# Patient Record
Sex: MALE | Race: WHITE | Employment: OTHER | URBAN - METROPOLITAN AREA
[De-identification: names, ages, dates, MRNs, and addresses within clinical notes are randomized per-mention and may not be internally consistent; named-entity substitution may affect disease eponyms.]

---

## 2017-01-04 ENCOUNTER — OFFICE VISIT (OUTPATIENT)
Dept: FAMILY MEDICINE CLINIC | Facility: CLINIC | Age: 72
End: 2017-01-04

## 2017-01-04 VITALS
OXYGEN SATURATION: 98 % | HEIGHT: 75 IN | BODY MASS INDEX: 38.67 KG/M2 | DIASTOLIC BLOOD PRESSURE: 80 MMHG | TEMPERATURE: 97 F | WEIGHT: 311 LBS | RESPIRATION RATE: 18 BRPM | SYSTOLIC BLOOD PRESSURE: 130 MMHG | HEART RATE: 64 BPM

## 2017-01-04 DIAGNOSIS — E03.9 HYPOTHYROIDISM, UNSPECIFIED TYPE: Primary | ICD-10-CM

## 2017-01-04 DIAGNOSIS — E11.40 TYPE 2 DIABETES MELLITUS WITH DIABETIC NEUROPATHY, WITHOUT LONG-TERM CURRENT USE OF INSULIN (HCC): ICD-10-CM

## 2017-01-04 DIAGNOSIS — I10 BENIGN ESSENTIAL HTN: ICD-10-CM

## 2017-01-04 PROCEDURE — 99214 OFFICE O/P EST MOD 30 MIN: CPT | Performed by: FAMILY MEDICINE

## 2017-01-04 RX ORDER — IPRATROPIUM BROMIDE 21 UG/1
SPRAY, METERED NASAL
Qty: 1 BOTTLE | Refills: 2 | Status: SHIPPED | OUTPATIENT
Start: 2017-01-04 | End: 2017-05-10

## 2017-01-04 RX ORDER — LEVOTHYROXINE SODIUM 0.2 MG/1
200 TABLET ORAL
Qty: 90 TABLET | Refills: 0 | Status: SHIPPED | OUTPATIENT
Start: 2017-01-04 | End: 2017-05-08

## 2017-01-04 NOTE — PROGRESS NOTES
[facility] Family Medicine Office Note  Chief Complaint:   Patient presents with:  Medication Follow-Up: DM,HTN ,HYPERCHOL      HPI:   This is a 66year old male coming in for follow up of DM2, HTN and hypothyroidism.     1.  HTN - Patient present disease, s/ stent x3 and RCA.    • Panic disorder      sees longtime psychiatrist   • Leukocytosis 11/09/2007   • Urinary retention 11/05/2007   • Dysuria 11/5/07     w/burning   • Obese    • Elevated lipids    • Hypokalemia 9/2007   • S/P coronary artery s STENT (BMS)      OTHER      Comment AAA repair    ANGIOGRAM      THYROIDECTOMY      OTHER SURGICAL HISTORY  9/07    Comment patent right coronary artery stent    OTHER SURGICAL HISTORY      Comment rt cataract     Social History:    Smoking Status: Current tablets daily Disp: 60 tablet Rfl: 3   spironolactone 50 MG Oral Tab Take 1 tablet (50 mg total) by mouth daily. Disp: 30 tablet Rfl: 6   tamsulosin HCl 0.4 MG Oral Cap Take 0.4 mg by mouth daily.    Disp:  Rfl:    GLIPIZIDE ER 5 MG Oral Tablet 24 Hr TAKE O the extremities  MUSCULOSKELETAL:  Denies muscle, back pain, joint pain or stiffness. + edema bilaterally LE  ENDOCRINOLOGIC:  Denies reports of sweating, cold or heat intolerance. Denies polyuria or polydipsia.     EXAM:   /80 mmHg  Pulse 64  Temp(Sr Maintenance:  Diabetes Care Dilated Eye Exam due on 11/20/1945  COLONOSCOPY, 10 YEARS due on 11/20/1994  INFLUENZA VACCINE due on 09/01/2015    Patient/Caregiver Education: Patient/Caregiver Education: There are no barriers to learning.  Medical education d

## 2017-01-05 ENCOUNTER — TELEPHONE (OUTPATIENT)
Dept: FAMILY MEDICINE CLINIC | Facility: CLINIC | Age: 72
End: 2017-01-05

## 2017-01-05 ENCOUNTER — PATIENT OUTREACH (OUTPATIENT)
Dept: CASE MANAGEMENT | Age: 72
End: 2017-01-05

## 2017-01-05 DIAGNOSIS — E03.9 HYPOTHYROIDISM, UNSPECIFIED TYPE: Primary | ICD-10-CM

## 2017-01-05 NOTE — PROGRESS NOTES
1/5/2017  Called and LM on identified home phone requesting patient return my call at [phone].      1/11/2017  Called and LM on with spouse.  She states she will have patient return my call at [phone].      1/16/2017  Called and LM on identified c

## 2017-02-07 RX ORDER — GABAPENTIN 300 MG/1
300 CAPSULE ORAL 3 TIMES DAILY
Qty: 90 CAPSULE | Refills: 0 | Status: SHIPPED | OUTPATIENT
Start: 2017-02-07 | End: 2017-02-08

## 2017-02-07 NOTE — TELEPHONE ENCOUNTER
Pt needs refill of gabapentin 300 MG Oral Cap. He states [facility] has faxed to us twice. Please advise. Thank you.

## 2017-02-08 ENCOUNTER — APPOINTMENT (OUTPATIENT)
Dept: LAB | Age: 72
End: 2017-02-08
Attending: FAMILY MEDICINE
Payer: MEDICARE

## 2017-02-08 DIAGNOSIS — E03.9 HYPOTHYROIDISM, UNSPECIFIED TYPE: ICD-10-CM

## 2017-02-08 LAB
FREE T4: 1.1 NG/DL (ref 0.9–1.8)
TSI SER-ACNC: 1.12 MIU/ML (ref 0.35–5.5)

## 2017-02-08 PROCEDURE — 80048 BASIC METABOLIC PNL TOTAL CA: CPT

## 2017-02-08 PROCEDURE — 36415 COLL VENOUS BLD VENIPUNCTURE: CPT

## 2017-02-08 PROCEDURE — 84443 ASSAY THYROID STIM HORMONE: CPT

## 2017-02-08 PROCEDURE — 84439 ASSAY OF FREE THYROXINE: CPT

## 2017-02-08 RX ORDER — GABAPENTIN 300 MG/1
300 CAPSULE ORAL 3 TIMES DAILY
Qty: 270 CAPSULE | Refills: 0 | Status: SHIPPED | OUTPATIENT
Start: 2017-02-08 | End: 2017-05-08 | Stop reason: ALTCHOICE

## 2017-02-09 ENCOUNTER — TELEPHONE (OUTPATIENT)
Dept: FAMILY MEDICINE CLINIC | Facility: CLINIC | Age: 72
End: 2017-02-09

## 2017-02-09 DIAGNOSIS — E03.9 HYPOTHYROIDISM, UNSPECIFIED TYPE: Primary | ICD-10-CM

## 2017-02-16 ENCOUNTER — OFFICE VISIT (OUTPATIENT)
Dept: FAMILY MEDICINE CLINIC | Facility: CLINIC | Age: 72
End: 2017-02-16

## 2017-02-16 VITALS
HEIGHT: 75 IN | BODY MASS INDEX: 39.04 KG/M2 | SYSTOLIC BLOOD PRESSURE: 130 MMHG | TEMPERATURE: 97 F | WEIGHT: 314 LBS | RESPIRATION RATE: 18 BRPM | DIASTOLIC BLOOD PRESSURE: 80 MMHG | HEART RATE: 74 BPM

## 2017-02-16 DIAGNOSIS — K43.9 VENTRAL HERNIA WITHOUT OBSTRUCTION OR GANGRENE: Primary | ICD-10-CM

## 2017-02-16 PROCEDURE — 99213 OFFICE O/P EST LOW 20 MIN: CPT | Performed by: FAMILY MEDICINE

## 2017-02-16 NOTE — PATIENT INSTRUCTIONS
Hernia (Adult)    A hernia can happen when there is a weakness or defect in the wall of the abdomen or groin. Intestines or nearby tissues may move from their usual location and push through the weakness in the wall.  This can cause a hernia (bulge) you m · Incarcerated/Strangulated: The intestine is trapped (incarcerated). If this happens, you won’t be able to push the bulge back in. If the incarcerated hernia isn’t treated, it may become strangulated.  This means the area loses blood supply and the tissue · Nausea and vomiting  · Severe pain, redness, or tenderness in the area near the hernia  · Pain worsens quickly and doesn’t get better  · Inability to have a bowel movement or pass gas  · Fever of 100.4°F (38°C) or higher  · Trouble breathing  · Fainting · Femoral: This type is in the groin, upper thigh (where the leg bends), or labia. It is more common in women. · Ventral: This type is in the abdominal wall. · Umbilical: This type occurs around the navel (belly button).   · Incisional: This type occurs a Follow up with your healthcare provider, or as directed. If imaging tests were done, they will be reviewed a doctor. You will be told the results and any new findings that may affect your care.   When to seek medical advice  Call your healthcare provider ri

## 2017-02-16 NOTE — PROGRESS NOTES
[facility] Family Medicine Office Note  Chief Complaint:   Patient presents with:  Lump: abd lump      HPI:   This is a 66year old male coming in for bulge on lower abdomen x 2 months. Has no h/o hernia.   First noticed it 2 months ago but doesn • Enlarged thyroid    • Tracheal deviation 11/6/07     to right presumed secondary to an enlarged thyroid   • Echocardiogram abnormal 9/7/07     technically difficult study d/t anatomic reasons, mild inferior basilar hypokinesia, mild concentric left marianela Bromide 0.03 % Nasal Solution 1-2 sprays each nostril every 12 hours PRN Disp: 1 Bottle Rfl: 2   torsemide 20 MG Oral Tab Take 1 tablet (20 mg total) by mouth 2 (two) times daily.  Disp: 120 tablet Rfl: 3   METOPROLOL SUCCINATE ER 50 MG Oral Tablet 24 Hr TA Disp:  Rfl:       Ready to quit: Not Answered  Counseling given: Not Answered       REVIEW OF SYSTEMS:   ROS:  CONSTITUTIONAL:  Denies any unusual weight gain/loss, fever, chills, weakness or fatigue. SKIN:  No rash or itching.   CARDIOVASCULAR:  Denies ch Patient/Caregiver Education: There are no barriers to learning. Medical education done. Outcome: Patient verbalizes understanding. Patient is notified to call with any questions, complications, allergies, or worsening or changing symptoms.   Patient is to

## 2017-02-21 ENCOUNTER — PATIENT OUTREACH (OUTPATIENT)
Dept: CASE MANAGEMENT | Age: 72
End: 2017-02-21

## 2017-02-23 PROBLEM — K43.9 VENTRAL HERNIA WITHOUT OBSTRUCTION OR GANGRENE: Status: ACTIVE | Noted: 2017-02-23

## 2017-02-27 ENCOUNTER — TELEPHONE (OUTPATIENT)
Dept: FAMILY MEDICINE CLINIC | Facility: CLINIC | Age: 72
End: 2017-02-27

## 2017-02-27 RX ORDER — CLONAZEPAM 1 MG/1
0.5 TABLET ORAL 4 TIMES DAILY
COMMUNITY
End: 2017-04-10 | Stop reason: ALTCHOICE

## 2017-02-27 RX ORDER — FINASTERIDE 5 MG/1
5 TABLET, FILM COATED ORAL DAILY
COMMUNITY
End: 2017-03-06

## 2017-02-27 NOTE — TELEPHONE ENCOUNTER
No information received from surgeons office. Outgoing call placed to Dr. Ella Lomax for surgery scheduler(Agnes) to CB, concerning medical clearance/test/lab.

## 2017-02-27 NOTE — TELEPHONE ENCOUNTER
Patient called regarding scheduling their pre-op exam.  Advised patient to call their surgeon office and request that the information regarding their surgery (and any required testing)  be faxed to our office at [phone].  Advised patient that our offi

## 2017-02-27 NOTE — TELEPHONE ENCOUNTER
Illa Cover returned Inez's call (Inez not at desk). Jayden Lyman states pre-op testing info would come from hospital - they will determine. Jayden Lyman will call pre-admission charge nurse at [facility] (ph:  [phone]) and have her fax info to us.   This msg printe

## 2017-02-28 ENCOUNTER — TELEPHONE (OUTPATIENT)
Dept: FAMILY MEDICINE CLINIC | Facility: CLINIC | Age: 72
End: 2017-02-28

## 2017-02-28 DIAGNOSIS — Z01.818 PRE-OP TESTING: Primary | ICD-10-CM

## 2017-02-28 NOTE — TELEPHONE ENCOUNTER
Incoming fax received from [facility]. EKG/lab request([employer] panel). Patient is scheduled to have robotic assisted ventral hernia repair with mesh on 3/9/2017 with Dr. Nya Simmons.   Medical clearance is not requested, per CHERY TAYLOROE

## 2017-03-03 ENCOUNTER — APPOINTMENT (OUTPATIENT)
Dept: LAB | Facility: HOSPITAL | Age: 72
End: 2017-03-03
Payer: MEDICARE

## 2017-03-03 DIAGNOSIS — Z72.0 TOBACCO ABUSE: ICD-10-CM

## 2017-03-03 DIAGNOSIS — I87.2 VENOUS INSUFFICIENCY: ICD-10-CM

## 2017-03-03 DIAGNOSIS — L03.119 CELLULITIS OF LOWER EXTREMITY, UNSPECIFIED LATERALITY: ICD-10-CM

## 2017-03-03 DIAGNOSIS — R73.9 HYPERGLYCEMIA: ICD-10-CM

## 2017-03-03 DIAGNOSIS — Z91.81 AT RISK FOR FALLING: ICD-10-CM

## 2017-03-03 DIAGNOSIS — I10 ESSENTIAL HYPERTENSION, BENIGN: ICD-10-CM

## 2017-03-03 DIAGNOSIS — R60.9 PERIPHERAL EDEMA: ICD-10-CM

## 2017-03-03 DIAGNOSIS — G47.33 OSA ON CPAP: ICD-10-CM

## 2017-03-03 DIAGNOSIS — I50.9 ACUTE ON CHRONIC CONGESTIVE HEART FAILURE, UNSPECIFIED CONGESTIVE HEART FAILURE TYPE: ICD-10-CM

## 2017-03-03 DIAGNOSIS — F41.0 PANIC DISORDER: ICD-10-CM

## 2017-03-03 DIAGNOSIS — I21.9 MYOCARDIAL INFARCT (HCC): ICD-10-CM

## 2017-03-03 DIAGNOSIS — E66.9 DIABETES MELLITUS TYPE 2 IN OBESE (HCC): ICD-10-CM

## 2017-03-03 DIAGNOSIS — E11.69 DIABETES MELLITUS TYPE 2 IN OBESE (HCC): ICD-10-CM

## 2017-03-03 DIAGNOSIS — E78.00 HYPERCHOLESTEROLEMIA: ICD-10-CM

## 2017-03-03 DIAGNOSIS — C61 PROSTATE CANCER (HCC): ICD-10-CM

## 2017-03-03 DIAGNOSIS — E03.9 HYPOTHYROIDISM, UNSPECIFIED TYPE: ICD-10-CM

## 2017-03-03 DIAGNOSIS — C61 MALIGNANT NEOPLASM OF PROSTATE (HCC): ICD-10-CM

## 2017-03-03 DIAGNOSIS — E88.81 METABOLIC SYNDROME: ICD-10-CM

## 2017-03-03 DIAGNOSIS — I25.10 CAD IN NATIVE ARTERY: ICD-10-CM

## 2017-03-03 DIAGNOSIS — E78.5 HYPERLIPIDEMIA, UNSPECIFIED: ICD-10-CM

## 2017-03-03 DIAGNOSIS — K80.20 GALLSTONE: ICD-10-CM

## 2017-03-03 DIAGNOSIS — E11.9 TYPE 2 DIABETES MELLITUS WITHOUT COMPLICATION, UNSPECIFIED LONG TERM INSULIN USE STATUS: ICD-10-CM

## 2017-03-03 DIAGNOSIS — E66.01 MORBID OBESITY, UNSPECIFIED OBESITY TYPE (HCC): ICD-10-CM

## 2017-03-03 DIAGNOSIS — M17.11 OSTEOARTHRITIS OF RIGHT KNEE: ICD-10-CM

## 2017-03-03 DIAGNOSIS — E11.65 TYPE 2 DIABETES MELLITUS WITH HYPERGLYCEMIA, WITHOUT LONG-TERM CURRENT USE OF INSULIN (HCC): ICD-10-CM

## 2017-03-03 DIAGNOSIS — I10 ESSENTIAL HYPERTENSION: ICD-10-CM

## 2017-03-03 DIAGNOSIS — K43.9 VENTRAL HERNIA WITHOUT OBSTRUCTION OR GANGRENE: ICD-10-CM

## 2017-03-03 DIAGNOSIS — R93.1 ECHOCARDIOGRAM ABNORMAL: ICD-10-CM

## 2017-03-03 DIAGNOSIS — I25.10 CORONARY ARTERY DISEASE INVOLVING NATIVE CORONARY ARTERY OF NATIVE HEART WITHOUT ANGINA PECTORIS: ICD-10-CM

## 2017-03-03 DIAGNOSIS — I10 BENIGN ESSENTIAL HTN: ICD-10-CM

## 2017-03-03 DIAGNOSIS — N28.9 ACUTE RENAL INSUFFICIENCY: ICD-10-CM

## 2017-03-03 DIAGNOSIS — Z95.5 S/P CORONARY ARTERY STENT PLACEMENT: ICD-10-CM

## 2017-03-03 DIAGNOSIS — F32.A DEPRESSION, UNSPECIFIED DEPRESSION TYPE: ICD-10-CM

## 2017-03-03 DIAGNOSIS — J44.1 COPD EXACERBATION (HCC): ICD-10-CM

## 2017-03-03 DIAGNOSIS — J39.8 TRACHEAL DEVIATION: ICD-10-CM

## 2017-03-03 DIAGNOSIS — K64.9 HEMORRHOIDS: ICD-10-CM

## 2017-03-03 DIAGNOSIS — J42 CHRONIC BRONCHITIS, UNSPECIFIED CHRONIC BRONCHITIS TYPE (HCC): ICD-10-CM

## 2017-03-03 DIAGNOSIS — Z99.89 OSA ON CPAP: ICD-10-CM

## 2017-03-03 LAB
ATRIAL RATE: 77 BPM
CHLORIDE: 103 MMOL/L (ref 101–111)
CO2: 25 MMOL/L (ref 22–32)
P AXIS: 33 DEGREES
P-R INTERVAL: 144 MS
POTASSIUM SERPL-SCNC: 4.2 MMOL/L (ref 3.6–5.1)
Q-T INTERVAL: 382 MS
QRS DURATION: 98 MS
QTC CALCULATION (BEZET): 432 MS
R AXIS: 43 DEGREES
SODIUM SERPL-SCNC: 138 MMOL/L (ref 136–144)
T AXIS: 77 DEGREES
VENTRICULAR RATE: 77 BPM

## 2017-03-03 PROCEDURE — 93005 ELECTROCARDIOGRAM TRACING: CPT

## 2017-03-03 PROCEDURE — 80051 ELECTROLYTE PANEL: CPT

## 2017-03-03 PROCEDURE — 93010 ELECTROCARDIOGRAM REPORT: CPT | Performed by: INTERNAL MEDICINE

## 2017-03-03 PROCEDURE — 36415 COLL VENOUS BLD VENIPUNCTURE: CPT

## 2017-03-09 ENCOUNTER — ANESTHESIA EVENT (OUTPATIENT)
Dept: SURGERY | Facility: HOSPITAL | Age: 72
End: 2017-03-09

## 2017-03-09 ENCOUNTER — SURGERY (OUTPATIENT)
Age: 72
End: 2017-03-09

## 2017-03-09 ENCOUNTER — HOSPITAL ENCOUNTER (INPATIENT)
Facility: HOSPITAL | Age: 72
LOS: 3 days | Discharge: NURSING, REHAB OR LONG-TERM CARE | DRG: 353 | End: 2017-03-15
Attending: SURGERY | Admitting: SURGERY
Payer: MEDICARE

## 2017-03-09 ENCOUNTER — ANESTHESIA (OUTPATIENT)
Dept: SURGERY | Facility: HOSPITAL | Age: 72
End: 2017-03-09

## 2017-03-09 DIAGNOSIS — F41.0 PANIC DISORDER: ICD-10-CM

## 2017-03-09 DIAGNOSIS — J39.8 TRACHEAL DEVIATION: ICD-10-CM

## 2017-03-09 DIAGNOSIS — E66.01 MORBID OBESITY, UNSPECIFIED OBESITY TYPE (HCC): ICD-10-CM

## 2017-03-09 DIAGNOSIS — E11.65 TYPE 2 DIABETES MELLITUS WITH HYPERGLYCEMIA, WITHOUT LONG-TERM CURRENT USE OF INSULIN (HCC): ICD-10-CM

## 2017-03-09 DIAGNOSIS — R73.9 HYPERGLYCEMIA: ICD-10-CM

## 2017-03-09 DIAGNOSIS — I10 ESSENTIAL HYPERTENSION, BENIGN: ICD-10-CM

## 2017-03-09 DIAGNOSIS — Z99.89 OSA ON CPAP: ICD-10-CM

## 2017-03-09 DIAGNOSIS — G47.33 OSA ON CPAP: ICD-10-CM

## 2017-03-09 DIAGNOSIS — C61 MALIGNANT NEOPLASM OF PROSTATE (HCC): Primary | ICD-10-CM

## 2017-03-09 DIAGNOSIS — R93.1 ECHOCARDIOGRAM ABNORMAL: ICD-10-CM

## 2017-03-09 DIAGNOSIS — J42 CHRONIC BRONCHITIS, UNSPECIFIED CHRONIC BRONCHITIS TYPE (HCC): ICD-10-CM

## 2017-03-09 DIAGNOSIS — F32.A DEPRESSION, UNSPECIFIED DEPRESSION TYPE: ICD-10-CM

## 2017-03-09 DIAGNOSIS — L03.119 CELLULITIS OF LOWER EXTREMITY, UNSPECIFIED LATERALITY: ICD-10-CM

## 2017-03-09 DIAGNOSIS — I21.9 MYOCARDIAL INFARCT (HCC): ICD-10-CM

## 2017-03-09 DIAGNOSIS — E88.81 METABOLIC SYNDROME: ICD-10-CM

## 2017-03-09 DIAGNOSIS — I25.10 CORONARY ARTERY DISEASE INVOLVING NATIVE CORONARY ARTERY OF NATIVE HEART WITHOUT ANGINA PECTORIS: ICD-10-CM

## 2017-03-09 DIAGNOSIS — E66.9 DIABETES MELLITUS TYPE 2 IN OBESE (HCC): ICD-10-CM

## 2017-03-09 DIAGNOSIS — I25.10 CAD IN NATIVE ARTERY: ICD-10-CM

## 2017-03-09 DIAGNOSIS — Z91.81 AT RISK FOR FALLING: ICD-10-CM

## 2017-03-09 DIAGNOSIS — C61 PROSTATE CANCER (HCC): ICD-10-CM

## 2017-03-09 DIAGNOSIS — I50.9 ACUTE ON CHRONIC CONGESTIVE HEART FAILURE, UNSPECIFIED CONGESTIVE HEART FAILURE TYPE: ICD-10-CM

## 2017-03-09 DIAGNOSIS — I10 ESSENTIAL HYPERTENSION: ICD-10-CM

## 2017-03-09 DIAGNOSIS — I87.2 VENOUS INSUFFICIENCY: ICD-10-CM

## 2017-03-09 DIAGNOSIS — J44.1 COPD EXACERBATION (HCC): ICD-10-CM

## 2017-03-09 DIAGNOSIS — K64.9 HEMORRHOIDS: ICD-10-CM

## 2017-03-09 DIAGNOSIS — E03.9 HYPOTHYROIDISM, UNSPECIFIED TYPE: ICD-10-CM

## 2017-03-09 DIAGNOSIS — K43.9 VENTRAL HERNIA WITHOUT OBSTRUCTION OR GANGRENE: ICD-10-CM

## 2017-03-09 DIAGNOSIS — R60.9 PERIPHERAL EDEMA: ICD-10-CM

## 2017-03-09 DIAGNOSIS — M17.11 OSTEOARTHRITIS OF RIGHT KNEE: ICD-10-CM

## 2017-03-09 DIAGNOSIS — E78.00 HYPERCHOLESTEROLEMIA: ICD-10-CM

## 2017-03-09 DIAGNOSIS — E11.69 DIABETES MELLITUS TYPE 2 IN OBESE (HCC): ICD-10-CM

## 2017-03-09 DIAGNOSIS — I10 BENIGN ESSENTIAL HTN: ICD-10-CM

## 2017-03-09 DIAGNOSIS — E11.9 TYPE 2 DIABETES MELLITUS WITHOUT COMPLICATION, UNSPECIFIED LONG TERM INSULIN USE STATUS: ICD-10-CM

## 2017-03-09 DIAGNOSIS — N28.9 ACUTE RENAL INSUFFICIENCY: ICD-10-CM

## 2017-03-09 DIAGNOSIS — K80.20 GALLSTONE: ICD-10-CM

## 2017-03-09 DIAGNOSIS — E78.5 HYPERLIPIDEMIA, UNSPECIFIED: ICD-10-CM

## 2017-03-09 DIAGNOSIS — Z95.5 S/P CORONARY ARTERY STENT PLACEMENT: ICD-10-CM

## 2017-03-09 DIAGNOSIS — Z72.0 TOBACCO ABUSE: ICD-10-CM

## 2017-03-09 LAB
GLUCOSE BLD-MCNC: 162 MG/DL (ref 65–99)
GLUCOSE BLD-MCNC: 173 MG/DL (ref 65–99)
GLUCOSE BLD-MCNC: 181 MG/DL (ref 65–99)
GLUCOSE BLD-MCNC: 182 MG/DL (ref 65–99)

## 2017-03-09 PROCEDURE — 8E0W4CZ ROBOTIC ASSISTED PROCEDURE OF TRUNK REGION, PERCUTANEOUS ENDOSCOPIC APPROACH: ICD-10-PCS | Performed by: SURGERY

## 2017-03-09 PROCEDURE — 0WUF4JZ SUPPLEMENT ABDOMINAL WALL WITH SYNTHETIC SUBSTITUTE, PERCUTANEOUS ENDOSCOPIC APPROACH: ICD-10-PCS | Performed by: SURGERY

## 2017-03-09 PROCEDURE — 5A09357 ASSISTANCE WITH RESPIRATORY VENTILATION, LESS THAN 24 CONSECUTIVE HOURS, CONTINUOUS POSITIVE AIRWAY PRESSURE: ICD-10-PCS | Performed by: SURGERY

## 2017-03-09 PROCEDURE — 99233 SBSQ HOSP IP/OBS HIGH 50: CPT | Performed by: HOSPITALIST

## 2017-03-09 RX ORDER — ALBUTEROL SULFATE 90 UG/1
2 AEROSOL, METERED RESPIRATORY (INHALATION) EVERY 6 HOURS PRN
Status: DISCONTINUED | OUTPATIENT
Start: 2017-03-09 | End: 2017-03-15

## 2017-03-09 RX ORDER — ONDANSETRON 2 MG/ML
4 INJECTION INTRAMUSCULAR; INTRAVENOUS AS NEEDED
Status: DISCONTINUED | OUTPATIENT
Start: 2017-03-09 | End: 2017-03-09 | Stop reason: HOSPADM

## 2017-03-09 RX ORDER — POLYETHYLENE GLYCOL 3350 17 G/17G
17 POWDER, FOR SOLUTION ORAL DAILY PRN
Status: DISCONTINUED | OUTPATIENT
Start: 2017-03-09 | End: 2017-03-15

## 2017-03-09 RX ORDER — DEXTROSE MONOHYDRATE 25 G/50ML
50 INJECTION, SOLUTION INTRAVENOUS
Status: DISCONTINUED | OUTPATIENT
Start: 2017-03-09 | End: 2017-03-15

## 2017-03-09 RX ORDER — HYDROMORPHONE HYDROCHLORIDE 1 MG/ML
0.4 INJECTION, SOLUTION INTRAMUSCULAR; INTRAVENOUS; SUBCUTANEOUS EVERY 2 HOUR PRN
Status: DISCONTINUED | OUTPATIENT
Start: 2017-03-09 | End: 2017-03-15

## 2017-03-09 RX ORDER — SODIUM CHLORIDE, SODIUM LACTATE, POTASSIUM CHLORIDE, CALCIUM CHLORIDE 600; 310; 30; 20 MG/100ML; MG/100ML; MG/100ML; MG/100ML
INJECTION, SOLUTION INTRAVENOUS CONTINUOUS
Status: DISCONTINUED | OUTPATIENT
Start: 2017-03-09 | End: 2017-03-10

## 2017-03-09 RX ORDER — DEXAMETHASONE SODIUM PHOSPHATE 4 MG/ML
4 VIAL (ML) INJECTION AS NEEDED
Status: CANCELLED | OUTPATIENT
Start: 2017-03-09 | End: 2017-03-09

## 2017-03-09 RX ORDER — SODIUM CHLORIDE 9 MG/ML
INJECTION, SOLUTION INTRAVENOUS CONTINUOUS
Status: DISCONTINUED | OUTPATIENT
Start: 2017-03-09 | End: 2017-03-10

## 2017-03-09 RX ORDER — DEXTROSE MONOHYDRATE 25 G/50ML
50 INJECTION, SOLUTION INTRAVENOUS
Status: CANCELLED | OUTPATIENT
Start: 2017-03-09

## 2017-03-09 RX ORDER — METOCLOPRAMIDE HYDROCHLORIDE 5 MG/ML
10 INJECTION INTRAMUSCULAR; INTRAVENOUS EVERY 6 HOURS PRN
Status: DISCONTINUED | OUTPATIENT
Start: 2017-03-09 | End: 2017-03-13

## 2017-03-09 RX ORDER — NALOXONE HYDROCHLORIDE 0.4 MG/ML
80 INJECTION, SOLUTION INTRAMUSCULAR; INTRAVENOUS; SUBCUTANEOUS AS NEEDED
Status: CANCELLED | OUTPATIENT
Start: 2017-03-09 | End: 2017-03-09

## 2017-03-09 RX ORDER — SPIRONOLACTONE 25 MG/1
50 TABLET ORAL DAILY
Status: DISCONTINUED | OUTPATIENT
Start: 2017-03-09 | End: 2017-03-15

## 2017-03-09 RX ORDER — LOSARTAN POTASSIUM 100 MG/1
100 TABLET ORAL DAILY
Status: DISCONTINUED | OUTPATIENT
Start: 2017-03-09 | End: 2017-03-15

## 2017-03-09 RX ORDER — ATORVASTATIN CALCIUM 20 MG/1
20 TABLET, FILM COATED ORAL NIGHTLY
Status: DISCONTINUED | OUTPATIENT
Start: 2017-03-09 | End: 2017-03-15

## 2017-03-09 RX ORDER — HYDROMORPHONE HYDROCHLORIDE 1 MG/ML
0.8 INJECTION, SOLUTION INTRAMUSCULAR; INTRAVENOUS; SUBCUTANEOUS EVERY 2 HOUR PRN
Status: DISCONTINUED | OUTPATIENT
Start: 2017-03-09 | End: 2017-03-15

## 2017-03-09 RX ORDER — SODIUM PHOSPHATE, DIBASIC AND SODIUM PHOSPHATE, MONOBASIC 7; 19 G/133ML; G/133ML
1 ENEMA RECTAL ONCE AS NEEDED
Status: ACTIVE | OUTPATIENT
Start: 2017-03-09 | End: 2017-03-09

## 2017-03-09 RX ORDER — LIDOCAINE HYDROCHLORIDE 10 MG/ML
INJECTION, SOLUTION INFILTRATION; PERINEURAL AS NEEDED
Status: DISCONTINUED | OUTPATIENT
Start: 2017-03-09 | End: 2017-03-09 | Stop reason: HOSPADM

## 2017-03-09 RX ORDER — SODIUM CHLORIDE, SODIUM LACTATE, POTASSIUM CHLORIDE, CALCIUM CHLORIDE 600; 310; 30; 20 MG/100ML; MG/100ML; MG/100ML; MG/100ML
INJECTION, SOLUTION INTRAVENOUS CONTINUOUS
Status: CANCELLED | OUTPATIENT
Start: 2017-03-09

## 2017-03-09 RX ORDER — HYDROMORPHONE HYDROCHLORIDE 1 MG/ML
0.4 INJECTION, SOLUTION INTRAMUSCULAR; INTRAVENOUS; SUBCUTANEOUS EVERY 5 MIN PRN
Status: CANCELLED | OUTPATIENT
Start: 2017-03-09 | End: 2017-03-09

## 2017-03-09 RX ORDER — TORSEMIDE 20 MG/1
20 TABLET ORAL
Status: DISCONTINUED | OUTPATIENT
Start: 2017-03-09 | End: 2017-03-15

## 2017-03-09 RX ORDER — NALOXONE HYDROCHLORIDE 0.4 MG/ML
80 INJECTION, SOLUTION INTRAMUSCULAR; INTRAVENOUS; SUBCUTANEOUS AS NEEDED
Status: DISCONTINUED | OUTPATIENT
Start: 2017-03-09 | End: 2017-03-09 | Stop reason: HOSPADM

## 2017-03-09 RX ORDER — DEXTROSE MONOHYDRATE 25 G/50ML
50 INJECTION, SOLUTION INTRAVENOUS
Status: DISCONTINUED | OUTPATIENT
Start: 2017-03-09 | End: 2017-03-09 | Stop reason: HOSPADM

## 2017-03-09 RX ORDER — HYDROCODONE BITARTRATE AND ACETAMINOPHEN 5; 325 MG/1; MG/1
1 TABLET ORAL EVERY 4 HOURS PRN
Status: DISCONTINUED | OUTPATIENT
Start: 2017-03-09 | End: 2017-03-15

## 2017-03-09 RX ORDER — HYDROCODONE BITARTRATE AND ACETAMINOPHEN 5; 325 MG/1; MG/1
2 TABLET ORAL EVERY 4 HOURS PRN
Status: DISCONTINUED | OUTPATIENT
Start: 2017-03-09 | End: 2017-03-15

## 2017-03-09 RX ORDER — ALFUZOSIN HYDROCHLORIDE 10 MG/1
10 TABLET, EXTENDED RELEASE ORAL
Status: DISCONTINUED | OUTPATIENT
Start: 2017-03-10 | End: 2017-03-15

## 2017-03-09 RX ORDER — GABAPENTIN 300 MG/1
300 CAPSULE ORAL 3 TIMES DAILY
Status: DISCONTINUED | OUTPATIENT
Start: 2017-03-09 | End: 2017-03-15

## 2017-03-09 RX ORDER — CALCIUM CARBONATE 200(500)MG
1000 TABLET,CHEWABLE ORAL 3 TIMES DAILY PRN
Status: DISCONTINUED | OUTPATIENT
Start: 2017-03-09 | End: 2017-03-15

## 2017-03-09 RX ORDER — HYDROMORPHONE HYDROCHLORIDE 1 MG/ML
1.2 INJECTION, SOLUTION INTRAMUSCULAR; INTRAVENOUS; SUBCUTANEOUS EVERY 2 HOUR PRN
Status: DISCONTINUED | OUTPATIENT
Start: 2017-03-09 | End: 2017-03-15

## 2017-03-09 RX ORDER — LEVOTHYROXINE SODIUM 0.2 MG/1
200 TABLET ORAL
Status: DISCONTINUED | OUTPATIENT
Start: 2017-03-10 | End: 2017-03-15

## 2017-03-09 RX ORDER — CLONIDINE HYDROCHLORIDE 0.1 MG/1
0.1 TABLET ORAL 2 TIMES DAILY
Status: DISCONTINUED | OUTPATIENT
Start: 2017-03-09 | End: 2017-03-15

## 2017-03-09 RX ORDER — DOCUSATE SODIUM 100 MG/1
100 CAPSULE, LIQUID FILLED ORAL 2 TIMES DAILY
Status: DISCONTINUED | OUTPATIENT
Start: 2017-03-09 | End: 2017-03-15

## 2017-03-09 RX ORDER — DULOXETIN HYDROCHLORIDE 30 MG/1
60 CAPSULE, DELAYED RELEASE ORAL DAILY
Status: DISCONTINUED | OUTPATIENT
Start: 2017-03-10 | End: 2017-03-15

## 2017-03-09 RX ORDER — BUPIVACAINE HYDROCHLORIDE 5 MG/ML
INJECTION, SOLUTION EPIDURAL; INTRACAUDAL AS NEEDED
Status: DISCONTINUED | OUTPATIENT
Start: 2017-03-09 | End: 2017-03-09 | Stop reason: HOSPADM

## 2017-03-09 RX ORDER — CLONAZEPAM 1 MG/1
1 TABLET ORAL 4 TIMES DAILY
Status: DISCONTINUED | OUTPATIENT
Start: 2017-03-09 | End: 2017-03-15

## 2017-03-09 RX ORDER — FINASTERIDE 5 MG/1
5 TABLET, FILM COATED ORAL DAILY
Status: DISCONTINUED | OUTPATIENT
Start: 2017-03-10 | End: 2017-03-15

## 2017-03-09 RX ORDER — HYDROMORPHONE HYDROCHLORIDE 1 MG/ML
INJECTION, SOLUTION INTRAMUSCULAR; INTRAVENOUS; SUBCUTANEOUS
Status: COMPLETED
Start: 2017-03-09 | End: 2017-03-09

## 2017-03-09 RX ORDER — ZOLPIDEM TARTRATE 5 MG/1
5 TABLET ORAL NIGHTLY PRN
Status: DISCONTINUED | OUTPATIENT
Start: 2017-03-09 | End: 2017-03-15

## 2017-03-09 RX ORDER — BISACODYL 10 MG
10 SUPPOSITORY, RECTAL RECTAL
Status: DISCONTINUED | OUTPATIENT
Start: 2017-03-09 | End: 2017-03-15

## 2017-03-09 RX ORDER — ONDANSETRON 2 MG/ML
4 INJECTION INTRAMUSCULAR; INTRAVENOUS AS NEEDED
Status: CANCELLED | OUTPATIENT
Start: 2017-03-09 | End: 2017-03-09

## 2017-03-09 RX ORDER — HYDROMORPHONE HYDROCHLORIDE 1 MG/ML
0.4 INJECTION, SOLUTION INTRAMUSCULAR; INTRAVENOUS; SUBCUTANEOUS EVERY 5 MIN PRN
Status: DISCONTINUED | OUTPATIENT
Start: 2017-03-09 | End: 2017-03-09 | Stop reason: HOSPADM

## 2017-03-09 RX ORDER — ONDANSETRON 2 MG/ML
4 INJECTION INTRAMUSCULAR; INTRAVENOUS EVERY 6 HOURS PRN
Status: DISCONTINUED | OUTPATIENT
Start: 2017-03-09 | End: 2017-03-15

## 2017-03-09 RX ORDER — METOPROLOL SUCCINATE 50 MG/1
50 TABLET, EXTENDED RELEASE ORAL
Status: DISCONTINUED | OUTPATIENT
Start: 2017-03-10 | End: 2017-03-15

## 2017-03-09 RX ORDER — METOCLOPRAMIDE HYDROCHLORIDE 5 MG/ML
10 INJECTION INTRAMUSCULAR; INTRAVENOUS AS NEEDED
Status: CANCELLED | OUTPATIENT
Start: 2017-03-09 | End: 2017-03-09

## 2017-03-09 NOTE — INTERVAL H&P NOTE
Pre-op Diagnosis: VENTRAL HERNIA    The above referenced H&P was reviewed by Andrew Hernandez MD on 3/9/2017, the patient was examined and no significant changes have occurred in the patient's condition since the H&P was performed.   I discussed with the travon

## 2017-03-09 NOTE — H&P (VIEW-ONLY)
Vanessa Stephen is a 66year old male. Patient presents with:  Consult: ventral hernia for 20 years. ref by Dr. Magen Gillespie. Lynn Abarca    HPI:  Patient presents with a large bulge involving his abdominal wall.  This encompasses the entire area augie 11/6/07     to right presumed secondary to an enlarged thyroid   • Echocardiogram abnormal 9/7/07     technically difficult study d/t anatomic reasons, mild inferior basilar hypokinesia, mild concentric left ventricular hypertrophy, decreased left ventricu Bottle Rfl: 2   torsemide 20 MG Oral Tab Take 1 tablet (20 mg total) by mouth 2 (two) times daily.  Disp: 120 tablet Rfl: 3   METOPROLOL SUCCINATE ER 50 MG Oral Tablet 24 Hr TAKE ONE TABLET BY MOUTH ONCE DAILY Disp: 90 tablet Rfl: 0   HYDROcodone-acetaminop fever or chills  SKIN: denies any unusual skin rashes or jaundice  HEENT: denies nasal congestion, sinus pain or sore throat; hearing loss negative  RESPIRATORY: denies shortness of breath, wheezing or cough   CARDIOVASCULAR: denies chest pain or MARTINEZ; no p anxiety medicine for because of this.     Thank you very much for your kind referrals    Bennet Bamberger MD

## 2017-03-09 NOTE — ANESTHESIA POSTPROCEDURE EVALUATION
[facility] Patient Status:  Outpatient in a Bed   Age/Gender 66year old male MRN [id number]   Location [address] [facility] Attending Luca Ruelas MD   Hosp Day # 0 PCP TRISTAN POZO,        Anesthesia Post-op Note

## 2017-03-09 NOTE — PLAN OF CARE
Pt to room 305 via bed per transport. All belongings including c pap mask accompany him. Pt a/ox4. Vss. Lap sites x 3 wnl.  Will continue to monitor  At 1530, spoke with dr. Zach Acevedo. He will see the patient later this evening

## 2017-03-09 NOTE — CONSULTS
[facility]  CONSULT     Za Hernandez Patient Status:  Outpatient in a Bed    1945 MRN [id number]   [facility] 3NW-A Attending Ever Watters MD   Hosp Day # 0 PCP Alejandro Poster, DO     Reason for consult: DM  Reque 5/26/05     right   • Hypercholesterolemia    • History of noncompliance with medical treatment 9/07   • Aneurysm [facility])      large,infrarenal   • CAD (coronary artery disease)    • Metabolic syndrome    • Acute sinusitis 1/4/12   • Tobacco abuse    • Teo Onset   • Heart Attack Father    • bone cancer[other] [OTHER] Mother      Allergies: Ace Inhibitors          Coughing  Medications:    No current facility-administered medications on file prior to encounter.   Current Outpatient Prescriptions on File Prio BASE) MCG/ACT Inhalation Aero Soln Inhale 2 puffs into the lungs every 6 (six) hours as needed for Wheezing. Disp: 1 Inhaler Rfl: 3   FREESTYLE LANCETS Does not apply Misc Pt. Tests four times daily .  Dx code E11.9 Disp: 2 Box Rfl: 0   aspirin (ECOTRIN) 32 stable  10. Nicotine dependence- 1/2 PPD  1. Advised on cessation   11. Hypothyroidism  1.  Synthroid     Quality:  · DVT Prophylaxis: SCds  · CODE status: Full  Plan of care discussed with patient   Cory Delgado MD  3/9/2017

## 2017-03-09 NOTE — OR NURSING
Patient stated he fell \"this morning\" in his home before coming to the hospital. Noted skin tear to right knee and right forearm. Bacitracin ointment and bandaids applied.

## 2017-03-09 NOTE — BRIEF OP NOTE
[facility] SURGERY  Brief Op Note     Adonica Bears Location: OR   CSN [id number] MRN [id number]   Admission Date 3/9/2017 Operation Date 3/9/2017   Attending Physician Geovanna Hurtado MD Operating Physician Estevan Mortimer, MD       Pre-Operative Diag

## 2017-03-09 NOTE — ANESTHESIA PREPROCEDURE EVALUATION
PRE-OP EVALUATION    Patient Name: Navya Hyatt    Pre-op Diagnosis: VENTRAL HERNIA    Procedure(s):  ROBOTIC ASSISTED VENTRAL HERNIA REPAIR WITH MESH    Surgeon(s) and Role:     Armando Miles MD - Primary    Pre-op vitals reviewed.         Body mas mouth daily.    Disp:  Rfl:    SYMBICORT 160-4.5 MCG/ACT Inhalation Aerosol INHALE TWO PUFFS BY MOUTH TWICE DAILY (Patient taking differently: INHALE TWO PUFFS BY MOUTH TWICE DAILY PRN) Disp: 1 Inhaler Rfl: 5   Glucose Blood (FREESTYLE LITE TEST) In Vitro S iliofemoral angiogram    HEMORRHOIDECTOMY  9/6/07    CATH BARE METAL STENT (BMS)      OTHER      Comment AAA repair    ANGIOGRAM      THYROIDECTOMY      OTHER SURGICAL HISTORY  9/07    Comment patent right coronary artery stent    OTHER SURGICAL HISTORY

## 2017-03-10 LAB
ALBUMIN SERPL-MCNC: 2.9 G/DL (ref 3.5–4.8)
ALP LIVER SERPL-CCNC: 74 U/L (ref 45–117)
ALT SERPL-CCNC: 14 U/L (ref 17–63)
AST SERPL-CCNC: 13 U/L (ref 15–41)
BILIRUB SERPL-MCNC: 0.5 MG/DL (ref 0.1–2)
BUN BLD-MCNC: 35 MG/DL (ref 8–20)
CALCIUM BLD-MCNC: 8.6 MG/DL (ref 8.3–10.3)
CHLORIDE: 100 MMOL/L (ref 101–111)
CO2: 28 MMOL/L (ref 22–32)
CREAT BLD-MCNC: 2.29 MG/DL (ref 0.7–1.3)
GLUCOSE BLD-MCNC: 133 MG/DL (ref 65–99)
GLUCOSE BLD-MCNC: 149 MG/DL (ref 65–99)
GLUCOSE BLD-MCNC: 166 MG/DL (ref 65–99)
GLUCOSE BLD-MCNC: 168 MG/DL (ref 70–99)
GLUCOSE BLD-MCNC: 169 MG/DL (ref 65–99)
HAV IGM SER QL: 2.1 MG/DL (ref 1.7–3)
M PROTEIN MFR SERPL ELPH: 5.8 G/DL (ref 6.1–8.3)
POTASSIUM SERPL-SCNC: 4.4 MMOL/L (ref 3.6–5.1)
SODIUM SERPL-SCNC: 136 MMOL/L (ref 136–144)

## 2017-03-10 PROCEDURE — 99232 SBSQ HOSP IP/OBS MODERATE 35: CPT | Performed by: HOSPITALIST

## 2017-03-10 RX ORDER — SIMETHICONE 80 MG
80 TABLET,CHEWABLE ORAL 3 TIMES DAILY
Status: DISCONTINUED | OUTPATIENT
Start: 2017-03-10 | End: 2017-03-15

## 2017-03-10 RX ORDER — SIMETHICONE 80 MG
160 TABLET,CHEWABLE ORAL 3 TIMES DAILY
Status: DISCONTINUED | OUTPATIENT
Start: 2017-03-10 | End: 2017-03-15

## 2017-03-10 NOTE — PROGRESS NOTES
[facility]  Progress Note    Dukeles Mendez Patient Status:  Outpatient in a Bed    1945 MRN [id number]   [facility] 3NW-A Attending Jennifer Hernandez MD   Hosp Day # 1 PCP TRISTAN POZO,      Subjective:    Patient sittin insufficiency     Peripheral edema     Acute renal insufficiency     Ventral hernia without obstruction or gangrene        Impression:     65 y/o POD # 1 ROBOTIC ASSISTED VENTRAL HERNIA REPAIR WITH MESH  -monitor urine output/retention    Plan:    -encoura

## 2017-03-10 NOTE — PROGRESS NOTES
Pt voiding in small amounts during night. C/o increase bladder pressure. Bladder scan done with reading of >999. Straight cath done under sterile technique. Some resistance noted when inserting cath. Pt tolerated well, 900ml clear yellow urine obtained.

## 2017-03-10 NOTE — PROGRESS NOTES
Increased confusion noted after norco given. Pt getting oob by self after multiple instructions given to call for asst.  Bed alarm in place. Pt states 'i need to find my wife'. Reorientation given w/out difficulty.

## 2017-03-10 NOTE — CM/SW NOTE
Patient notified CM that he is having financial trouble. Asked CM about local resources. States he has no computer. CM provided patient with information and phone numbers for resources that he might benefit from.  CM also asked liaison from [facility] to

## 2017-03-10 NOTE — PROGRESS NOTES
asst charge rn with bedside st cath @ this time. 12 f cath used, inserted with some resistance. 450cc clear yellow urine noted upon return. Scrotal swelling noted.

## 2017-03-10 NOTE — PHYSICAL THERAPY NOTE
PHYSICAL THERAPY EVALUATION - INPATIENT     Room Number: 305/305-A  Evaluation Date: 3/10/2017  Type of Evaluation: Initial  Physician Order: PT Eval and Treat    Presenting Problem: ventral hernia repair 3/9/17  Reason for Therapy: Mobility Dysfunctio bifemoral atherosclerotic iliac disease   • Depression    • Coronary disease      s/p LAD stent   • Elevated PSA    • Hemorrhoids    • Cerumen impaction 5/26/05     right   • Hypercholesterolemia    • History of noncompliance with medical treatment 9/07 Spouse; Son     Patient Owned Equipment: None  Patient Regularly Uses: None    Prior Level of [place]: ind PTA;  Reports fall prior to admit. States used RW in the past, and decided didn't want to use anymore and now wife is using it.       SUBJECTIVE Impairment Score: 46.58%   Standardized Score (AM-PAC Scale): 43.63   CMS Modifier (G-Code): CK    FUNCTIONAL ABILITY STATUS  Gait Assessment   Gait Assistance: Maximum assistance (actual: min A)  Distance (ft): 75  Assistive Device: Rolling walker  Patter training;Strengthening;Stoop training;Stair training;Transfer training;Balance training  Rehab Potential : Good  Frequency (Obs): 5x/week  Number of Visits to Meet Established Goals: 5      CURRENT GOALS    Goal #1 Patient is able to demonstrate supine - s

## 2017-03-10 NOTE — PROGRESS NOTES
Bladder scan by this rn = 457 @ this time. Pt up to bathroom & tried to urinate but was unable. Pt reports he feels like he does have to void.   Will st cath at this time

## 2017-03-10 NOTE — PLAN OF CARE
Problem: GASTROINTESTINAL - ADULT  Goal: Maintains or returns to baseline bowel function  INTERVENTIONS:  - Assess bowel function  - Maintain adequate hydration with IV or PO as ordered and tolerated  - Evaluate effectiveness of GI medications  - Encourage falls.  - [place] fall precautions as indicated by assessment.  - Educate pt/family on patient safety including physical limitations  - Instruct pt to call for assistance with activity based on assessment  - Modify environment to reduce risk of injury  -

## 2017-03-10 NOTE — PROGRESS NOTES
[facility]  Progress Note     Wm Hardy Patient Status:  Outpatient in a Bed    1945 MRN [id number]   [facility] 3NW-A Attending Santhosh Fontaine MD   Hosp Day # 1  [facility],      Chief Complaint: s/p he • spironolactone  50 mg Oral Daily   • Fluticasone Furoate-Vilanterol  1 puff Inhalation Daily   • Alfuzosin HCl ER  10 mg Oral Daily with breakfast   • torsemide  20 mg Oral BID (Diuretic)   • insulin aspart  1-50 Units Subcutaneous TID CC and HS   • in

## 2017-03-11 LAB
AMMONIA: 11 UMOL/L (ref 11–32)
BILIRUB UR QL STRIP.AUTO: NEGATIVE
CLARITY UR REFRACT.AUTO: CLEAR
COLOR UR AUTO: YELLOW
ERYTHROCYTE [DISTWIDTH] IN BLOOD BY AUTOMATED COUNT: 13.2 % (ref 11.5–16)
FREE T4: 1.6 NG/DL (ref 0.9–1.8)
GLUCOSE BLD-MCNC: 128 MG/DL (ref 65–99)
GLUCOSE BLD-MCNC: 154 MG/DL (ref 65–99)
GLUCOSE BLD-MCNC: 160 MG/DL (ref 65–99)
GLUCOSE BLD-MCNC: 178 MG/DL (ref 65–99)
GLUCOSE UR STRIP.AUTO-MCNC: NEGATIVE MG/DL
HAV AB SERPL IA-ACNC: 407 PG/ML (ref 193–986)
HCT VFR BLD AUTO: 32.7 % (ref 37–53)
HGB BLD-MCNC: 11.1 G/DL (ref 13–17)
HSCRP: >28.5 MG/L (ref ?–3)
HYALINE CASTS #/AREA URNS AUTO: PRESENT /LPF
KETONES UR STRIP.AUTO-MCNC: NEGATIVE MG/DL
MCH RBC QN AUTO: 31.4 PG (ref 27–33.2)
MCHC RBC AUTO-ENTMCNC: 33.9 G/DL (ref 31–37)
MCV RBC AUTO: 92.4 FL (ref 80–99)
NITRITE UR QL STRIP.AUTO: NEGATIVE
PH UR STRIP.AUTO: 5 [PH] (ref 4.5–8)
PLATELET # BLD AUTO: 157 10(3)UL (ref 150–450)
PROT UR STRIP.AUTO-MCNC: 30 MG/DL
RBC # BLD AUTO: 3.54 X10(6)UL (ref 3.8–5.8)
RBC #/AREA URNS AUTO: >10 /HPF
RED CELL DISTRIBUTION WIDTH-SD: 44.7 FL (ref 35.1–46.3)
SED RATE-ML: 57 MM/HR (ref 0–12)
SP GR UR STRIP.AUTO: 1.01 (ref 1–1.03)
TSI SER-ACNC: 0.24 MIU/ML (ref 0.35–5.5)
UROBILINOGEN UR STRIP.AUTO-MCNC: <2 MG/DL
WBC # BLD AUTO: 14 X10(3) UL (ref 4–13)

## 2017-03-11 PROCEDURE — 99232 SBSQ HOSP IP/OBS MODERATE 35: CPT | Performed by: HOSPITALIST

## 2017-03-11 NOTE — PROGRESS NOTES
[facility]  Progress Note    Wm Hardy Patient Status:  Observation    1945 MRN [id number]   [facility] 3NW-A Attending Santhosh Fontaine MD   Hosp Day # 2 PCP TRISTAN POZO DO     Subjective:    Patient demanding to g exacerbation ([facility] [address]     Benign essential HTN     Diabetes mellitus type 2 in obese (HCC)     CAD in native artery     Hyperlipidemia, unspecified     At risk for falling     Congestive heart failure (CHF) (HCC)     Essential hypertension     Cellulitis of l

## 2017-03-11 NOTE — PROGRESS NOTES
Pt dropped am meds to floor. Sitting on bsc. Pt annoyed that he cannot leave today.   Dr Kelly Castellano aware of new consult

## 2017-03-11 NOTE — PROGRESS NOTES
[facility]  Progress Note     Milagro Estrella Patient Status:  Outpatient in a Bed    1945 MRN [id number]   [facility] 3NW-A Attending Maribel Purdy MD   Hosp Day # 2  [facility],      Chief Complaint: s/p he Metoprolol Succinate ER  50 mg Oral Daily Beta Blocker   • Atorvastatin Calcium  20 mg Oral Nightly   • spironolactone  50 mg Oral Daily   • Fluticasone Furoate-Vilanterol  1 puff Inhalation Daily   • Alfuzosin HCl ER  10 mg Oral Daily with breakfast   • t

## 2017-03-11 NOTE — PLAN OF CARE
Incision(s), wounds(s) or drain site(s) healing without S/S of infection Not Progressing      Minimal or absence of nausea and vomiting Progressing      Maintains or returns to baseline bowel function Progressing      Achieve highest/safest level of mobili

## 2017-03-11 NOTE — PROGRESS NOTES
Little output noted from luther. Luther irriaged a/ 30cc nss & no flow noted. Luther balloon deflated, repostioned & balloon reinserted. Luther started draining clear yellow urine.   re secured with stat lock to left leg

## 2017-03-11 NOTE — RESPIRATORY THERAPY NOTE
BR[facility]NEY - Equipment Use Daily Summary:  · Set Mode CFLEX  · Usage in hours: 2:06  · 90% Pressure (EPAP) level: 10.0  · 90% Insp Pressure (IPAP):   · AHI: 0.0  · Supplemental Oxygen:   · Comments:

## 2017-03-11 NOTE — OPERATIVE REPORT
[facility]    PATIENT'S NAME: Gaurav Enriquez   ATTENDING PHYSICIAN: Loree Cooper M.D. OPERATING PHYSICIAN: Loree Cooper M.D.    PATIENT ACCOUNT#:   [de-identified]    LOCATION:  [address] [place] [address]  MEDICAL RECORD #:   [id number]       DATE OF BIRTH:  1 in both directions before securing it. After this was accomplished, a medium size Ventralight mesh was introduced to the abdominal cavity. This was secured in position and then sewn in position circumferentially with an 0 V-Loc suture.   This provided exc

## 2017-03-11 NOTE — HOME CARE LIAISON
Received referral for [facility]. Referral sent to [facility] via [place]. [facility] has accepted pt and will provide skilled nurse and physical therapy. Unable to meet with pt/family due to pt's confusion; wife has asked me to come back later.        Thank yo

## 2017-03-11 NOTE — CM/SW NOTE
SW spoke w/pt's wife Carlene Doran regarding [facility] [employer]. PT recommending JO. Informed wife of this. Wife stated \"we can't afford it. \" Wife stating she wants home health because the pt had it in the past. Wife declined care giving resources stating they would not b

## 2017-03-11 NOTE — PROGRESS NOTES
Pt's son here. States he spoke with dr Lorena Bowling today & son would like pt to go to [facility] upon discharge.

## 2017-03-11 NOTE — PROGRESS NOTES
Assist pt to bathroom from chair.had some difficulty rising from chair to stand. Once up using walker with a fairly steady gait to bathroom. Eased pt to sit on toilet. Attempt to void with no success. Attempt to stand pt and unable to.  Each attempt pt was

## 2017-03-11 NOTE — CONSULTS
[facility]  Report of Consultation    Alize Valle Patient Status:  Observation    1945 MRN [id number]   [facility] 3NW-A Attending Mcihelle Webster MD   Hosp Day # 2  [facility],      Reason for Consultation:  Thao Jolley disease, s/ stent x3 and RCA.    • Panic disorder      sees longtime psychiatrist   • Leukocytosis 11/09/2007   • Urinary retention 11/05/2007   • Dysuria 11/5/07     w/burning   • Obese    • Elevated lipids    • Hypokalemia 9/2007   • S/P coronary artery s 09/28/2007    Comment left heart cath,liclat selective coronary angiogram,left iliofemoral angiogram    HEMORRHOIDECTOMY  9/6/07    CATH BARE METAL STENT (BMS)      OTHER      Comment AAA repair    ANGIOGRAM      THYROIDECTOMY      OTHER SURGICAL HISTORY puff, Inhalation, Q6H PRN  •  ClonazePAM (KLONOPIN) tab 1 mg, 1 mg, Oral, QID  •  CloNIDine HCl (CATAPRES) tab 0.1 mg, 0.1 mg, Oral, BID  •  DULoxetine HCl (CYMBALTA) DR particles cap 60 mg, 60 mg, Oral, Daily  •  finasteride (PROSCAR) tab 5 mg, 5 mg, Oral then closes eyes again  Son participates with our discussion  [place] catheter reviewed. Nurse had inserted it further in place and now draining better. No bleeding. Urine is pale yellow and clear.  For now with a 14fr in place, will leave this in place    [facility])     Essential hypertension     Cellulitis of lower extremity     Prostate cancer ([email or web address]     Hyperglycemia     Type 2 diabetes mellitus with hyperglycemia, without long-term current use of insulin (HCC)     Hypothyroidism     Morbid obesity ([facility]     V

## 2017-03-11 NOTE — PLAN OF CARE
Problem: GASTROINTESTINAL - ADULT  Goal: Maintains or returns to baseline bowel function  INTERVENTIONS:  - Assess bowel function  - Maintain adequate hydration with IV or PO as ordered and tolerated  - Evaluate effectiveness of GI medications  - Encourage FALL  Goal: Free from fall injury  INTERVENTIONS:  - Assess pt frequently for physical needs  - Identify cognitive and physical deficits and behaviors that affect risk of falls.   - [place] fall precautions as indicated by assessment.  - Educate pt/family

## 2017-03-12 ENCOUNTER — APPOINTMENT (OUTPATIENT)
Dept: CT IMAGING | Facility: HOSPITAL | Age: 72
DRG: 353 | End: 2017-03-12
Attending: HOSPITALIST
Payer: MEDICARE

## 2017-03-12 ENCOUNTER — APPOINTMENT (OUTPATIENT)
Dept: GENERAL RADIOLOGY | Facility: HOSPITAL | Age: 72
DRG: 353 | End: 2017-03-12
Attending: HOSPITALIST
Payer: MEDICARE

## 2017-03-12 LAB
ALBUMIN SERPL-MCNC: 2.7 G/DL (ref 3.5–4.8)
ALP LIVER SERPL-CCNC: 72 U/L (ref 45–117)
ALT SERPL-CCNC: 12 U/L (ref 17–63)
AST SERPL-CCNC: 11 U/L (ref 15–41)
BILIRUB SERPL-MCNC: 0.6 MG/DL (ref 0.1–2)
BUN BLD-MCNC: 44 MG/DL (ref 8–20)
CALCIUM BLD-MCNC: 9 MG/DL (ref 8.3–10.3)
CHLORIDE: 102 MMOL/L (ref 101–111)
CO2: 27 MMOL/L (ref 22–32)
CREAT BLD-MCNC: 2.35 MG/DL (ref 0.7–1.3)
ERYTHROCYTE [DISTWIDTH] IN BLOOD BY AUTOMATED COUNT: 13.3 % (ref 11.5–16)
GLUCOSE BLD-MCNC: 132 MG/DL (ref 70–99)
GLUCOSE BLD-MCNC: 138 MG/DL (ref 65–99)
GLUCOSE BLD-MCNC: 152 MG/DL (ref 65–99)
GLUCOSE BLD-MCNC: 163 MG/DL (ref 65–99)
GLUCOSE BLD-MCNC: 210 MG/DL (ref 65–99)
HCT VFR BLD AUTO: 31.6 % (ref 37–53)
HGB BLD-MCNC: 10.8 G/DL (ref 13–17)
M PROTEIN MFR SERPL ELPH: 6.2 G/DL (ref 6.1–8.3)
MCH RBC QN AUTO: 31.9 PG (ref 27–33.2)
MCHC RBC AUTO-ENTMCNC: 34.2 G/DL (ref 31–37)
MCV RBC AUTO: 93.2 FL (ref 80–99)
PLATELET # BLD AUTO: 158 10(3)UL (ref 150–450)
POTASSIUM SERPL-SCNC: 3.8 MMOL/L (ref 3.6–5.1)
RBC # BLD AUTO: 3.39 X10(6)UL (ref 3.8–5.8)
RED CELL DISTRIBUTION WIDTH-SD: 45.5 FL (ref 35.1–46.3)
SODIUM SERPL-SCNC: 138 MMOL/L (ref 136–144)
WBC # BLD AUTO: 12.5 X10(3) UL (ref 4–13)

## 2017-03-12 PROCEDURE — 70450 CT HEAD/BRAIN W/O DYE: CPT

## 2017-03-12 PROCEDURE — 71010 XR CHEST AP PORTABLE  (CPT=71010): CPT

## 2017-03-12 PROCEDURE — 99232 SBSQ HOSP IP/OBS MODERATE 35: CPT | Performed by: HOSPITALIST

## 2017-03-12 RX ORDER — POTASSIUM CHLORIDE 20 MEQ/1
20 TABLET, EXTENDED RELEASE ORAL ONCE
Status: COMPLETED | OUTPATIENT
Start: 2017-03-12 | End: 2017-03-12

## 2017-03-12 NOTE — PROGRESS NOTES
Neurology follow up NOTE    SUBJECTIVE:  Per nurse, he is very sleepy today. No agitation. He is sleeping with CPAP when I walk in, but easy to arouse. Denied pain and headaches.      OBJECTIVE:   /48 mmHg  Pulse 75  Temp(Src) 98.4 °F (36.9 °C) (Oral)

## 2017-03-12 NOTE — PROGRESS NOTES
[facility]  Progress Note     Iglesia Camilo Patient Status:  Outpatient in a Bed    1945 MRN [id number]   [facility] 3NW-A Attending Carmen Santos MD   Hosp Day # 3 PCP [address] [place] [address],      Chief Complaint: s/p he HCl  0.1 mg Oral BID   • DULoxetine HCl  60 mg Oral Daily   • finasteride  5 mg Oral Daily   • gabapentin  300 mg Oral TID   • Levothyroxine Sodium  200 mcg Oral Before breakfast   • losartan  100 mg Oral Daily   • Metoprolol Succinate ER  50 mg Oral Daily

## 2017-03-12 NOTE — RESPIRATORY THERAPY NOTE
BR[facility]NEY - Equipment Use Daily Summary:  · Set Mode   · Usage in hours:   · 90% Pressure (EPAP) level:   · 90% Insp Pressure (IPAP):   · AHI:   · Supplemental Oxygen:  · Comments: NO DATA

## 2017-03-12 NOTE — PROGRESS NOTES
Pt much more calm after luther draining. Son here & spoke with dr Dilip Arreola. Per dr Carmine Arreola, eeg = no seizure activity.   Dr Carmine Arreola also states she is going to order ct scan of brain for tomorrow

## 2017-03-12 NOTE — CONSULTS
[facility]    PATIENT'S NAME: Se Handler   ATTENDING PHYSICIAN: Naina Rocha M.D.   CONSULTING PHYSICIAN: Ning Flores M.D.    PATIENT ACCOUNT#:   [de-identified]    LOCATION:  [address] [place] [address]  MEDICAL RECORD #:   [id number]       DATE OF BIRTH:  10 depression, anxiety, elevated PSA, aneurysm infrarenal, sinusitis, gallstone, enlarged thyroid, prostate cancer, COPD, congestive heart failure. PAST SURGICAL HISTORY:  Cardiac catheterization, stent placement, thyroidectomy.     SOCIAL HISTORY:  He live hallucinations and agitation. Apparently, he has had surgery in the past and did not have similar issues. This could be multifactorial including medications. He did receive Norco and IV Dilaudid after the surgery.   He also has been sleep deprived the pa

## 2017-03-12 NOTE — PROCEDURES
[facility], [address] [place] [address]      PATIENT'S NAME: Janneth Iverson   ATTENDING PHYSICIAN: Avelina Henderson M.D.    PATIENT ACCOUNT #: [de-identified] LOCATION: [address] [place] [address]   MEDICAL RECORD #: [id number] DATE OF BIRTH Becki Carr M.D.  d: 03/11/2017 18:28:16  t: 03/11/2017 19:23:41  [facility] [id number]  /

## 2017-03-12 NOTE — OCCUPATIONAL THERAPY NOTE
OCCUPATIONAL THERAPY EVALUATION - INPATIENT     Room Number: 305/305-A  Evaluation Date: 3/12/2017  Type of Evaluation: Initial  Presenting Problem: Ventral Hernia, Malignant neoplasm of prostate    Physician Order: IP Consult to Occupational Therapy  Reas atherosclerotic iliac disease   • Depression    • Coronary disease      s/p LAD stent   • Elevated PSA    • Hemorrhoids    • Cerumen impaction 5/26/05     right   • Hypercholesterolemia    • History of noncompliance with medical treatment 9/07   • Aneurysm height toilet  Shower/Tub and Equipment: Walk-in shower  Other Equipment: None    Occupation/Status: retired  Hand Dominance: Right  Drives: Yes  Patient Regularly Uses: None    Prior Level of [place]: Pt reports being independent for all ADLs, IADLs washing, rinsing, drying)?: A Lot  -   Toileting, which includes using toilet, bedpan or urinal? : A Lot  -   Putting on and taking off regular upper body clothing?: A Lot  -   Taking care of personal grooming such as brushing teeth?: A Little  -   Eating Plan: Balance activities; Energy conservation/work simplification techniques;ADL training;Functional transfer training;UE strengthening/ROM; Patient/Family education;Patient/Family training;Equipment eval/education; Compensatory technique education  Rehab Pot

## 2017-03-12 NOTE — PLAN OF CARE
Will report anxiety at manageable levels Not Progressing      Confusion, delirium, dementia or psychosis is improved or at baseline Not Progressing      Achieve highest/safest level of mobility/gait Not Progressing      Incision(s), wounds(s) or drain site

## 2017-03-12 NOTE — PLAN OF CARE
Problem: GASTROINTESTINAL - ADULT  Goal: Maintains or returns to baseline bowel function  INTERVENTIONS:  - Assess bowel function  - Maintain adequate hydration with IV or PO as ordered and tolerated  - Evaluate effectiveness of GI medications  - Encourage and behaviors that affect risk of falls.   - [place] fall precautions as indicated by assessment.  - Educate pt/family on patient safety including physical limitations  - Instruct pt to call for assistance with activity based on assessment  - Modify envir

## 2017-03-12 NOTE — PROGRESS NOTES
[facility]  Progress Note    Rom Pinedo Patient Status:  Inpatient    1945 MRN [id number]   [facility] 3NW-A Attending Lynn Salas MD   Hosp Day # 3 PCP TRISTAN POZO, DO     Subjective:    Less confusion today.   Melinda Jackson bronchitis, unspecified chronic bronchitis type ([facility]     COPD exacerbation (HCC)     Benign essential HTN     Diabetes mellitus type 2 in obese ([facility]     CAD in native artery     Hyperlipidemia, unspecified     At risk for falling     Congestive heart miriam

## 2017-03-13 LAB
GLUCOSE BLD-MCNC: 127 MG/DL (ref 65–99)
GLUCOSE BLD-MCNC: 136 MG/DL (ref 65–99)
GLUCOSE BLD-MCNC: 147 MG/DL (ref 65–99)
GLUCOSE BLD-MCNC: 162 MG/DL (ref 65–99)
POTASSIUM SERPL-SCNC: 3.9 MMOL/L (ref 3.6–5.1)

## 2017-03-13 PROCEDURE — 99232 SBSQ HOSP IP/OBS MODERATE 35: CPT | Performed by: HOSPITALIST

## 2017-03-13 RX ORDER — CEPHALEXIN 500 MG/1
500 CAPSULE ORAL 3 TIMES DAILY
Status: DISCONTINUED | OUTPATIENT
Start: 2017-03-13 | End: 2017-03-15

## 2017-03-13 RX ORDER — METOCLOPRAMIDE HYDROCHLORIDE 5 MG/ML
5 INJECTION INTRAMUSCULAR; INTRAVENOUS EVERY 6 HOURS PRN
Status: DISCONTINUED | OUTPATIENT
Start: 2017-03-13 | End: 2017-03-15

## 2017-03-13 RX ORDER — HYDROCODONE BITARTRATE AND ACETAMINOPHEN 5; 325 MG/1; MG/1
1 TABLET ORAL EVERY 4 HOURS PRN
Qty: 20 TABLET | Refills: 0 | Status: SHIPPED | OUTPATIENT
Start: 2017-03-13 | End: 2017-03-22

## 2017-03-13 RX ORDER — CEPHALEXIN 500 MG/1
500 CAPSULE ORAL 3 TIMES DAILY
Qty: 30 CAPSULE | Refills: 0 | Status: SHIPPED | OUTPATIENT
Start: 2017-03-13 | End: 2017-03-23

## 2017-03-13 NOTE — PROGRESS NOTES
[facility]  Urology Progress Note    Pato Zapata Patient Status:  Inpatient    1945 MRN [id number]   [place] 3NW-A Attending Sienna Black MD   Hosp Day # 4 PCP TRISTAN POZO, DO     Subjective:  [address] [facility] still a 2 person assist.  Continue to work with PT.       [place], [place]  [facility] Urology  3/13/2017  9:13 AM

## 2017-03-13 NOTE — PLAN OF CARE
ANXIETY    • Will report anxiety at manageable levels         BEHAVIOR    • Pt/Family maintain appropriate behavior and adhere to behavioral management agreement, if implemented         CONFUSION    • Confusion, delirium, dementia or psychosis is improved

## 2017-03-13 NOTE — PLAN OF CARE
PAIN - ADULT    • Verbalizes/displays adequate comfort level or patient's stated pain goal Progressing          SAFETY ADULT - FALL    • Free from fall injury Progressing          GASTROINTESTINAL - ADULT    • Minimal or absence of nausea and vomiting Prog

## 2017-03-13 NOTE — PLAN OF CARE
ANXIETY    • Will report anxiety at manageable levels Progressing        BEHAVIOR    • Pt/Family maintain appropriate behavior and adhere to behavioral management agreement, if implemented Progressing        CONFUSION    • Confusion, delirium, dementia or monitor.

## 2017-03-13 NOTE — HOME CARE LIAISON
Received referral for [facility] on d/c for SN/PT. Met with patient who is agreeable to [facility] on d/c. Agency brochure given to patient.  Referral sent to [facility] via [facility]    Thank you for this referral,   Jose Casey

## 2017-03-13 NOTE — CM/SW NOTE
03/13/17 1100   CM/SW Referral Data   Referral Source Social Work (self-referral)   Reason for Referral Discharge planning   Informant Patient; Children   Pertinent Medical Hx   Primary Care Physician Name Love   Patient Info   Patient lives with Spouse;

## 2017-03-13 NOTE — PLAN OF CARE
The patient is in stable condition, his vital signs are stable, and he denies pain. He is tolerating his diet with no complaint of nausea and he passed flatus this evening. He sat up in the chair this evening for approximately 2 hours.

## 2017-03-13 NOTE — PHYSICAL THERAPY NOTE
PHYSICAL THERAPY TREATMENT NOTE - INPATIENT    Room Number: 004/493-J     Session: 1   Number of Visits to Meet Established Goals: 5    Presenting Problem: ventral hernia repair 3/9/17    Problem List  Active Problems:    Malignant neoplasm of prostate (H large,infrarenal   • CAD (coronary artery disease)    • Metabolic syndrome    • Acute sinusitis 1/4/12   • Tobacco abuse    • Gallstone 11/08/07     on ct small stone   • Atelectasis 11/6/07     on chest x-ray minimal right perihilar    • Enlarged thyroid techniques;Relaxation;Repositioning    BALANCE                                                                                                                     Static Sitting: Fair -  Dynamic Sitting: Fair -           Static [place] [address] sit. Pt left in chair, needs met. Patient End of Session: Up in chair;Needs met;Call light within reach;RN aware of session/findings; All patient questions and concerns addressed    ASSESSMENT   Pt seen for gait training, active flexibility and BLE

## 2017-03-13 NOTE — PROGRESS NOTES
Pharmacy Note: Renal dose adjustment for Metaclopramide (Reglan)  Nora Chowdhury has been prescribed Metoclopramide (Reglan) 10 mg every 6 hours as needed. Estimated Creatinine Clearance: 34 mL/min (based on Cr of 2.35).     His calculated creatinine c

## 2017-03-13 NOTE — PROGRESS NOTES
[facility]  Progress Note     Vince Melgar Patient Status:  Outpatient in a Bed    1945 MRN [id number]   [facility] 3NW-A Attending Radha Morales MD   Hosp Day # 4 PCP [address] [place] [address],      Chief Complaint: s/p he • CloNIDine HCl  0.1 mg Oral BID   • DULoxetine HCl  60 mg Oral Daily   • finasteride  5 mg Oral Daily   • gabapentin  300 mg Oral TID   • Levothyroxine Sodium  200 mcg Oral Before breakfast   • losartan  100 mg Oral Daily   • Metoprolol Succinate ER  50

## 2017-03-13 NOTE — CM/SW NOTE
Spoke with Dr. Yimi Shanks and Shannon Choudhury regarding d/c plan options. LMTCB for DIL Leeann: [phone]. CM awaiting return call back, updated RN.

## 2017-03-13 NOTE — PROGRESS NOTES
[facility]  Progress Note    Geo Antonio Patient Status:  Inpatient    1945 MRN [id number]   [facility] 3NW-A Attending Keiry Aquino MD   Hosp Day # 4 PCP TRISTAN POZO, DO     Subjective:    Patient sitting up in ch 2 diabetes mellitus with hyperglycemia, without long-term current use of insulin (HCC)     Hypothyroidism     Morbid obesity ([address]     Venous insufficiency     Peripheral edema     Acute renal insufficiency     Ventral hernia without obstruction or gangren

## 2017-03-13 NOTE — RESPIRATORY THERAPY NOTE
BRITNEY : EQUIPMENT USE: DAILY SUMMARY                                            SET MODE: AUTO CPAP WITH CFLEX                                           USAGE IN HOURS:  17:38

## 2017-03-14 LAB
BUN BLD-MCNC: 53 MG/DL (ref 8–20)
CALCIUM BLD-MCNC: 9.4 MG/DL (ref 8.3–10.3)
CHLORIDE: 104 MMOL/L (ref 101–111)
CO2: 29 MMOL/L (ref 22–32)
CREAT BLD-MCNC: 2.01 MG/DL (ref 0.7–1.3)
GLUCOSE BLD-MCNC: 139 MG/DL (ref 65–99)
GLUCOSE BLD-MCNC: 148 MG/DL (ref 70–99)
GLUCOSE BLD-MCNC: 170 MG/DL (ref 65–99)
GLUCOSE BLD-MCNC: 172 MG/DL (ref 65–99)
GLUCOSE BLD-MCNC: 190 MG/DL (ref 65–99)
MAGNESIUM, RBCS: 2.6 MMOL/L
POTASSIUM SERPL-SCNC: 3.7 MMOL/L (ref 3.6–5.1)
SODIUM SERPL-SCNC: 141 MMOL/L (ref 136–144)

## 2017-03-14 PROCEDURE — 99232 SBSQ HOSP IP/OBS MODERATE 35: CPT | Performed by: HOSPITALIST

## 2017-03-14 RX ORDER — POTASSIUM CHLORIDE 20 MEQ/1
20 TABLET, EXTENDED RELEASE ORAL ONCE
Status: COMPLETED | OUTPATIENT
Start: 2017-03-14 | End: 2017-03-14

## 2017-03-14 NOTE — RESPIRATORY THERAPY NOTE
BRITNEY - Equipment Use Daily Summary:  · Set Mode CFLEX  · Usage in hours: 1:54  · 90% Pressure (EPAP) level: 11.5  · 90% Insp Pressure (IPAP):   · AHI: 0.00  · Supplemental Oxygen:   · Comments:

## 2017-03-14 NOTE — PROGRESS NOTES
[facility]  Progress Note    Medhat Gaston Patient Status:  Inpatient    1945 MRN [id number]   [facility] 3NW-A Attending Cole Padron MD   Hosp Day # 5 PCP TRISTAN POZO DO     Subjective:    Patient tolerated PO die hyperglycemia, without long-term current use of insulin (HCC)     Hypothyroidism     Morbid obesity ([address]     Venous insufficiency     Peripheral edema     Acute renal insufficiency     Ventral hernia without obstruction or gangrene     Urinary retention

## 2017-03-14 NOTE — OCCUPATIONAL THERAPY NOTE
Attempted to see pt for skilled OT services this date. Pt is currently soundly sleeping with C-Pap mask applied. Will re-attempt 3/15/2017 as schedule allows.  Iva Murillo, 03/14/2017, 2:30 PM

## 2017-03-14 NOTE — PHYSICAL THERAPY NOTE
IP PT attempted x2, pt refused reporting fatigue, just returned from the [place] and did not sleep well previous night. Will re-attempt as schedule permits.

## 2017-03-14 NOTE — PLAN OF CARE
ANXIETY    • Will report anxiety at manageable levels Progressing        BEHAVIOR    • Pt/Family maintain appropriate behavior and adhere to behavioral management agreement, if implemented Progressing        CONFUSION    • Confusion, delirium, dementia or

## 2017-03-14 NOTE — PLAN OF CARE
ANXIETY    • Will report anxiety at manageable levels Progressing        BEHAVIOR    • Pt/Family maintain appropriate behavior and adhere to behavioral management agreement, if implemented Progressing        CONFUSION    • Confusion, delirium, dementia or Bed alarm. Bed in lowest position, call-light in reach, door open, falling star. \"No IV access is okay\"; spoke with Dr. Sheree French. Plan of care addressed; pt verbalizes understanding. All questions and concerns addressed. Will continue to monitor.  Plan to be

## 2017-03-14 NOTE — PROGRESS NOTES
[facility]  Progress Note     Marii Magana Patient Status:  Outpatient in a Bed    1945 MRN [id number]   [facility] 3NW-A Attending Jaziel Perez MD   Hosp Day # 5 PCP Jorge Hughes DO     Chief Complaint: s/p he CloNIDine HCl  0.1 mg Oral BID   • DULoxetine HCl  60 mg Oral Daily   • finasteride  5 mg Oral Daily   • gabapentin  300 mg Oral TID   • Levothyroxine Sodium  200 mcg Oral Before breakfast   • losartan  100 mg Oral Daily   • Metoprolol Succinate ER  50 mg

## 2017-03-14 NOTE — CM/SW NOTE
MSW spoke to son, he states he will  his Dad on Wed 3/15 at 8 am. If the appeal comes back today he wants to still wait till he allowed time is up to pick him up.  States today he has to study for a test.

## 2017-03-14 NOTE — CM/SW NOTE
MSW called by son Juanis Krueger at 4:30pm    Juanis Krueger states that he does believe his dad has shown improvement with mobility today however still thinks there are some memory/clarity issues.    Juanis Krueger states he spoke to the [facility] staff member who states that the case is sti

## 2017-03-14 NOTE — CM/SW NOTE
Spoke with DIL regarding d/c plan. Referral sent to [facility] for respite bed option. Per Chad Gatica at [facility], rate would be $200 per night. CM to meet POA (son) in room to further discuss d/c options.

## 2017-03-15 VITALS
DIASTOLIC BLOOD PRESSURE: 67 MMHG | WEIGHT: 315 LBS | HEART RATE: 69 BPM | SYSTOLIC BLOOD PRESSURE: 119 MMHG | OXYGEN SATURATION: 98 % | BODY MASS INDEX: 39.17 KG/M2 | HEIGHT: 75 IN | RESPIRATION RATE: 16 BRPM | TEMPERATURE: 98 F

## 2017-03-15 LAB
GLUCOSE BLD-MCNC: 150 MG/DL (ref 65–99)
GLUCOSE BLD-MCNC: 154 MG/DL (ref 65–99)
POTASSIUM SERPL-SCNC: 4.1 MMOL/L (ref 3.6–5.1)

## 2017-03-15 NOTE — CM/SW NOTE
MSW spoke to patient's son Nuris Sender about the O appeal. MSW explained that appeal was won, and patient has 3 inpatient midnights.  MSW explained to son that st the hospital will bill 3 midnights to Medicare, however the hospital can not guarantee future benef

## 2017-03-15 NOTE — CM/SW NOTE
MSW spoke to patient's son and patient in room. MSW reviewed PT notes with family/patient. Patient son (not Stella Echevarria) is offering to pay for skilled care at [facility]. Patient is declining this, states he wants to go home.  MSW stated she would f/u with patie

## 2017-03-15 NOTE — PROGRESS NOTES
[facility]  Progress Note    Milagro Estrella Patient Status:  Inpatient    1945 MRN [id number]   [facility] 3NW-A Attending Maribel Purdy MD   Hosp Day # 6 PCP TRISTAN POZO, DO     Subjective:    Patient worked with PT t ([facility]     Hypothyroidism     Morbid obesity ([address]     Venous insufficiency     Peripheral edema     Acute renal insufficiency     Ventral hernia without obstruction or gangrene     Urinary retention     JAMES (acute kidney injury) ([address]     CKD (chronic kidn

## 2017-03-15 NOTE — CM/SW NOTE
03/15/17 1500   Discharge disposition   Discharged to: Skilled Nurs   Name of [address]   Patient assessed for rehabilitation services?  Yes   Patient is Discharged to a [address] [place] Yes   Discharge transportatio

## 2017-03-15 NOTE — CM/SW NOTE
MSW called patient's spouse to confirm she was agreeable to her son Clay's plan for patient to go to [address] [facility]. Patient's wife is agreeable to d/c plan for [facility]. Patient was accepted to [facility].  [facility] arranged for 5:30pm    Pt to transport via

## 2017-03-15 NOTE — PLAN OF CARE
Minimal or absence of nausea and vomiting Progressing      Maintains or returns to baseline bowel function Progressing      Achieve highest/safest level of independence in self care Progressing      Achieve highest/safest level of mobility/gait Progressing

## 2017-03-15 NOTE — OCCUPATIONAL THERAPY NOTE
OCCUPATIONAL THERAPY TREATMENT NOTE - INPATIENT     Room Number: 570/032-F  Session: 1/5   Number of Visits to Meet Established Goals: 5    Presenting Problem: Ventral Hernia, Malignant neoplasm of prostate    History related to current admission:   Pt was Hemorrhoids    • Cerumen impaction 5/26/05     right   • Hypercholesterolemia    • History of noncompliance with medical treatment 9/07   • Aneurysm (HCC)      large,infrarenal   • CAD (coronary artery disease)    • Metabolic syndrome    • Acute sinusitis Restriction: None                PAIN ASSESSMENT  Ratin  Location:  (abdominal, all muscles)  Management Techniques: Activity promotion; Body mechanics;Repositioning     ACTIVITY TOLERANCE  94% on 2L, no SOB    ACTIVITIES OF DAILY LIVING ASSESSMENT  AM- supervision/assist PRN . OT Discharge Recommendations: Sub-acute rehabilitation  OT Device Recommendations: TBD    PLAN  OT Treatment Plan: Balance activities; Energy conservation/work simplification techniques;ADL training;Functional transfer training;

## 2017-03-15 NOTE — RESPIRATORY THERAPY NOTE
BRITNEY - Equipment Use Daily Summary:                  . Set Mode: AUTO CPAP WITH C-FLEX                . Usage in hours: 4:40                . 90% Pressure (EPAP) level: 10.3                . 90% Insp. Pressure (IPAP): [place] Song  AHI: 4.5

## 2017-03-15 NOTE — PHYSICAL THERAPY NOTE
PHYSICAL THERAPY TREATMENT NOTE - INPATIENT    Room Number: 639/201-E     Session: 2  Number of Visits to Meet Established Goals: 5    Presenting Problem: ventral hernia repair 3/9/17    Problem List  Active Problems:    Malignant neoplasm of prostate (HC large,infrarenal   • CAD (coronary artery disease)    • Metabolic syndrome    • Acute sinusitis 1/4/12   • Tobacco abuse    • Gallstone 11/08/07     on ct small stone   • Atelectasis 11/6/07     on chest x-ray minimal right perihilar    • Enlarged thyroid techniques;Relaxation;Repositioning    BALANCE                                                                                                                     Static Sitting: Fair -  Dynamic Sitting: Fair -           Static [place] [address] Sit>stand CGA to rw. Pt amb ~100ft c RW and CGA. No LOB noted and pt cued for safe placement of RW within [place]. Pt returned to [facility] chair c CGA, cues for hand placement and keeping RW close. Pt refusing further therex as breakfast tray arrives.  Educated pt on level: modified independent       Goal #3  Patient is able to ambulate 150 feet with assist device: least restrictive device at assistance level: supervision       Goal #4      Goal #5      Goal #6      Goal Comments: Goals established on 3/10/2017 toby

## 2017-03-15 NOTE — PROGRESS NOTES
Discharge instructions reviewed with the patient. Pt instructed diet as tolerated, activity as tolerated--no heavy lifting. Pt does not have an iv to be removed at this time. Pt going to [facility] with his CPAP tubing and mask.  Pt instructed to call and m

## 2017-03-16 ENCOUNTER — LAB REQUISITION (OUTPATIENT)
Dept: LAB | Facility: HOSPITAL | Age: 72
End: 2017-03-16
Attending: FAMILY MEDICINE
Payer: MEDICARE

## 2017-03-16 DIAGNOSIS — R69 ILLNESS: ICD-10-CM

## 2017-03-16 LAB
ALBUMIN SERPL-MCNC: 3.1 G/DL (ref 3.5–4.8)
ALP LIVER SERPL-CCNC: 106 U/L (ref 45–117)
ALT SERPL-CCNC: 107 U/L (ref 17–63)
AST SERPL-CCNC: 85 U/L (ref 15–41)
BASOPHILS # BLD AUTO: 0.1 X10(3) UL (ref 0–0.1)
BASOPHILS NFR BLD AUTO: 0.7 %
BILIRUB SERPL-MCNC: 0.4 MG/DL (ref 0.1–2)
BUN BLD-MCNC: 56 MG/DL (ref 8–20)
CALCIUM BLD-MCNC: 10.2 MG/DL (ref 8.3–10.3)
CHLORIDE: 99 MMOL/L (ref 101–111)
CO2: 27 MMOL/L (ref 22–32)
CREAT BLD-MCNC: 2.45 MG/DL (ref 0.7–1.3)
EOSINOPHIL # BLD AUTO: 0.66 X10(3) UL (ref 0–0.3)
EOSINOPHIL NFR BLD AUTO: 4.8 %
ERYTHROCYTE [DISTWIDTH] IN BLOOD BY AUTOMATED COUNT: 13.1 % (ref 11.5–16)
GLUCOSE BLD-MCNC: 170 MG/DL (ref 70–99)
HAV IGM SER QL: 2.4 MG/DL (ref 1.7–3)
HCT VFR BLD AUTO: 36.8 % (ref 37–53)
HGB BLD-MCNC: 12.5 G/DL (ref 13–17)
IMMATURE GRANULOCYTE COUNT: 0.15 X10(3) UL (ref 0–1)
IMMATURE GRANULOCYTE RATIO %: 1.1 %
LYMPHOCYTES # BLD AUTO: 1.75 X10(3) UL (ref 0.9–4)
LYMPHOCYTES NFR BLD AUTO: 12.6 %
M PROTEIN MFR SERPL ELPH: 7.6 G/DL (ref 6.1–8.3)
MCH RBC QN AUTO: 31.3 PG (ref 27–33.2)
MCHC RBC AUTO-ENTMCNC: 34 G/DL (ref 31–37)
MCV RBC AUTO: 92 FL (ref 80–99)
MONOCYTES # BLD AUTO: 1.56 X10(3) UL (ref 0.1–0.6)
MONOCYTES NFR BLD AUTO: 11.3 %
NEUTROPHIL ABS PRELIM: 9.62 X10 (3) UL (ref 1.3–6.7)
NEUTROPHILS # BLD AUTO: 9.62 X10(3) UL (ref 1.3–6.7)
NEUTROPHILS NFR BLD AUTO: 69.5 %
PLATELET # BLD AUTO: 283 10(3)UL (ref 150–450)
POTASSIUM SERPL-SCNC: 3.7 MMOL/L (ref 3.6–5.1)
RBC # BLD AUTO: 4 X10(6)UL (ref 3.8–5.8)
RED CELL DISTRIBUTION WIDTH-SD: 44 FL (ref 35.1–46.3)
SODIUM SERPL-SCNC: 137 MMOL/L (ref 136–144)
WBC # BLD AUTO: 13.8 X10(3) UL (ref 4–13)

## 2017-03-16 PROCEDURE — 80053 COMPREHEN METABOLIC PANEL: CPT | Performed by: FAMILY MEDICINE

## 2017-03-16 PROCEDURE — 85025 COMPLETE CBC W/AUTO DIFF WBC: CPT | Performed by: FAMILY MEDICINE

## 2017-03-16 PROCEDURE — 36415 COLL VENOUS BLD VENIPUNCTURE: CPT | Performed by: FAMILY MEDICINE

## 2017-03-16 PROCEDURE — 83735 ASSAY OF MAGNESIUM: CPT | Performed by: FAMILY MEDICINE

## 2017-03-17 ENCOUNTER — LAB REQUISITION (OUTPATIENT)
Dept: LAB | Facility: HOSPITAL | Age: 72
End: 2017-03-17
Attending: FAMILY MEDICINE
Payer: MEDICARE

## 2017-03-17 ENCOUNTER — TELEPHONE (OUTPATIENT)
Dept: FAMILY MEDICINE CLINIC | Facility: CLINIC | Age: 72
End: 2017-03-17

## 2017-03-17 DIAGNOSIS — R69 ILLNESS: ICD-10-CM

## 2017-03-17 LAB
BUN BLD-MCNC: 63 MG/DL (ref 8–20)
CALCIUM BLD-MCNC: 9.9 MG/DL (ref 8.3–10.3)
CHLORIDE: 100 MMOL/L (ref 101–111)
CO2: 24 MMOL/L (ref 22–32)
CREAT BLD-MCNC: 3.49 MG/DL (ref 0.7–1.3)
GLUCOSE BLD-MCNC: 160 MG/DL (ref 70–99)
POTASSIUM SERPL-SCNC: 4 MMOL/L (ref 3.6–5.1)
SODIUM SERPL-SCNC: 136 MMOL/L (ref 136–144)

## 2017-03-17 PROCEDURE — 80048 BASIC METABOLIC PNL TOTAL CA: CPT | Performed by: FAMILY MEDICINE

## 2017-03-17 PROCEDURE — 36415 COLL VENOUS BLD VENIPUNCTURE: CPT | Performed by: FAMILY MEDICINE

## 2017-03-17 NOTE — TELEPHONE ENCOUNTER
Confirmed dr adler is gone for the day. Call again to [facility] /janey-advised of earlier stated transfer to j[facility], only reaching voice mail. Asked if [facility] is still in.  Advised they will page her advising she has a call-placed me on h

## 2017-03-17 NOTE — DISCHARGE SUMMARY
[facility]  Discharge Summary    Starlette Cory Patient Status:  Inpatient    1945 MRN [id number]   [facility] 3NW-A Attending No att. providers found   Hosp Day # 6 PCP TRISTAN POZO DO     Date of Admission: 3/9/2017 without incident. Pt was admitted to floor following procedure for further evaluation and management. Once tolerating a diet and pain adequately controlled with PO rx, pt was deemed ready for discharge.               Disposition: SNF    Discharge Condition Tab  Take 1 tablet (0.1 mg total) by mouth 2 (two) times daily. , Normal, Disp-60 tablet, R-6    Potassium Chloride ER (KLOR-CON M20) 20 MEQ Oral Tab CR  Take 2 tablets daily, Normal, Disp-60 tablet, R-3    spironolactone 50 MG Oral Tab  Take 1 tablet (50 m

## 2017-03-17 NOTE — TELEPHONE ENCOUNTER
[phone] Call from dawn/spouse-listed on hipaa consent-stating pt \"had hernia repair at [facility] last wk, was fine and acting normally when he went in, surgery went fine but when he woke up he was out of his mind-not making sense, not acting like himself, [employer]

## 2017-03-18 ENCOUNTER — LAB REQUISITION (OUTPATIENT)
Dept: LAB | Facility: HOSPITAL | Age: 72
End: 2017-03-18
Attending: FAMILY MEDICINE
Payer: MEDICARE

## 2017-03-18 DIAGNOSIS — R69 ILLNESS: ICD-10-CM

## 2017-03-18 DIAGNOSIS — C61 MALIGNANT NEOPLASM OF PROSTATE (HCC): ICD-10-CM

## 2017-03-18 LAB
ALBUMIN SERPL-MCNC: 3.1 G/DL (ref 3.5–4.8)
ALP LIVER SERPL-CCNC: 99 U/L (ref 45–117)
ALT SERPL-CCNC: 93 U/L (ref 17–63)
AST SERPL-CCNC: 53 U/L (ref 15–41)
BASOPHILS # BLD AUTO: 0.06 X10(3) UL (ref 0–0.1)
BASOPHILS NFR BLD AUTO: 0.5 %
BILIRUB SERPL-MCNC: 0.5 MG/DL (ref 0.1–2)
BILIRUB UR QL STRIP.AUTO: NEGATIVE
BUN BLD-MCNC: 75 MG/DL (ref 8–20)
CALCIUM BLD-MCNC: 9.8 MG/DL (ref 8.3–10.3)
CHLORIDE: 99 MMOL/L (ref 101–111)
CO2: 28 MMOL/L (ref 22–32)
COLOR UR AUTO: YELLOW
CREAT BLD-MCNC: 3.49 MG/DL (ref 0.7–1.3)
EOSINOPHIL # BLD AUTO: 0.59 X10(3) UL (ref 0–0.3)
EOSINOPHIL NFR BLD AUTO: 4.5 %
ERYTHROCYTE [DISTWIDTH] IN BLOOD BY AUTOMATED COUNT: 13.3 % (ref 11.5–16)
GLUCOSE BLD-MCNC: 169 MG/DL (ref 70–99)
GLUCOSE UR STRIP.AUTO-MCNC: NEGATIVE MG/DL
HCT VFR BLD AUTO: 36.3 % (ref 37–53)
HGB BLD-MCNC: 11.6 G/DL (ref 13–17)
HYALINE CASTS #/AREA URNS AUTO: PRESENT /LPF
IMMATURE GRANULOCYTE COUNT: 0.27 X10(3) UL (ref 0–1)
IMMATURE GRANULOCYTE RATIO %: 2 %
KETONES UR STRIP.AUTO-MCNC: NEGATIVE MG/DL
LYMPHOCYTES # BLD AUTO: 1.64 X10(3) UL (ref 0.9–4)
LYMPHOCYTES NFR BLD AUTO: 12.4 %
M PROTEIN MFR SERPL ELPH: 7.3 G/DL (ref 6.1–8.3)
MCH RBC QN AUTO: 30.8 PG (ref 27–33.2)
MCHC RBC AUTO-ENTMCNC: 32 G/DL (ref 31–37)
MCV RBC AUTO: 96.3 FL (ref 80–99)
MONOCYTES # BLD AUTO: 1.3 X10(3) UL (ref 0.1–0.6)
MONOCYTES NFR BLD AUTO: 9.9 %
NEUTROPHIL ABS PRELIM: 9.32 X10 (3) UL (ref 1.3–6.7)
NEUTROPHILS # BLD AUTO: 9.32 X10(3) UL (ref 1.3–6.7)
NEUTROPHILS NFR BLD AUTO: 70.7 %
NITRITE UR QL STRIP.AUTO: NEGATIVE
PH UR STRIP.AUTO: 5 [PH] (ref 4.5–8)
PLATELET # BLD AUTO: 314 10(3)UL (ref 150–450)
POTASSIUM SERPL-SCNC: 3.8 MMOL/L (ref 3.6–5.1)
PROT UR STRIP.AUTO-MCNC: NEGATIVE MG/DL
RBC # BLD AUTO: 3.77 X10(6)UL (ref 3.8–5.8)
RBC #/AREA URNS AUTO: >10 /HPF
RED CELL DISTRIBUTION WIDTH-SD: 47.1 FL (ref 35.1–46.3)
SODIUM SERPL-SCNC: 136 MMOL/L (ref 136–144)
SP GR UR STRIP.AUTO: 1.01 (ref 1–1.03)
UROBILINOGEN UR STRIP.AUTO-MCNC: <2 MG/DL
WBC # BLD AUTO: 13.2 X10(3) UL (ref 4–13)

## 2017-03-18 PROCEDURE — 36415 COLL VENOUS BLD VENIPUNCTURE: CPT | Performed by: FAMILY MEDICINE

## 2017-03-18 PROCEDURE — 80053 COMPREHEN METABOLIC PANEL: CPT | Performed by: FAMILY MEDICINE

## 2017-03-18 PROCEDURE — 81001 URINALYSIS AUTO W/SCOPE: CPT | Performed by: FAMILY MEDICINE

## 2017-03-18 PROCEDURE — 87086 URINE CULTURE/COLONY COUNT: CPT | Performed by: FAMILY MEDICINE

## 2017-03-18 PROCEDURE — 85025 COMPLETE CBC W/AUTO DIFF WBC: CPT | Performed by: FAMILY MEDICINE

## 2017-03-19 ENCOUNTER — LAB REQUISITION (OUTPATIENT)
Dept: LAB | Facility: HOSPITAL | Age: 72
End: 2017-03-19
Attending: FAMILY MEDICINE
Payer: MEDICARE

## 2017-03-19 DIAGNOSIS — R19.7 DIARRHEA: ICD-10-CM

## 2017-03-19 PROCEDURE — 87493 C DIFF AMPLIFIED PROBE: CPT | Performed by: FAMILY MEDICINE

## 2017-03-20 ENCOUNTER — SNF VISIT (OUTPATIENT)
Dept: INTERNAL MEDICINE CLINIC | Age: 72
End: 2017-03-20

## 2017-03-20 ENCOUNTER — LAB REQUISITION (OUTPATIENT)
Dept: LAB | Facility: HOSPITAL | Age: 72
End: 2017-03-20
Attending: FAMILY MEDICINE
Payer: MEDICARE

## 2017-03-20 VITALS
OXYGEN SATURATION: 96 % | HEART RATE: 74 BPM | SYSTOLIC BLOOD PRESSURE: 121 MMHG | RESPIRATION RATE: 20 BRPM | TEMPERATURE: 99 F | DIASTOLIC BLOOD PRESSURE: 57 MMHG

## 2017-03-20 DIAGNOSIS — G47.33 OSA ON CPAP: ICD-10-CM

## 2017-03-20 DIAGNOSIS — R69 ILLNESS: ICD-10-CM

## 2017-03-20 DIAGNOSIS — C61 PROSTATE CANCER (HCC): ICD-10-CM

## 2017-03-20 DIAGNOSIS — Z99.89 OSA ON CPAP: ICD-10-CM

## 2017-03-20 DIAGNOSIS — N17.9 AKI (ACUTE KIDNEY INJURY) (HCC): ICD-10-CM

## 2017-03-20 DIAGNOSIS — R33.9 URINARY RETENTION: ICD-10-CM

## 2017-03-20 DIAGNOSIS — E03.9 HYPOTHYROIDISM, UNSPECIFIED TYPE: ICD-10-CM

## 2017-03-20 DIAGNOSIS — E11.69 DIABETES MELLITUS TYPE 2 IN OBESE (HCC): ICD-10-CM

## 2017-03-20 DIAGNOSIS — E11.65 TYPE 2 DIABETES MELLITUS WITH HYPERGLYCEMIA, WITHOUT LONG-TERM CURRENT USE OF INSULIN (HCC): ICD-10-CM

## 2017-03-20 DIAGNOSIS — I25.10 CORONARY ARTERY DISEASE INVOLVING NATIVE CORONARY ARTERY OF NATIVE HEART WITHOUT ANGINA PECTORIS: ICD-10-CM

## 2017-03-20 DIAGNOSIS — J42 CHRONIC BRONCHITIS, UNSPECIFIED CHRONIC BRONCHITIS TYPE (HCC): ICD-10-CM

## 2017-03-20 DIAGNOSIS — I50.9 ACUTE ON CHRONIC CONGESTIVE HEART FAILURE, UNSPECIFIED CONGESTIVE HEART FAILURE TYPE: ICD-10-CM

## 2017-03-20 DIAGNOSIS — E66.9 DIABETES MELLITUS TYPE 2 IN OBESE (HCC): ICD-10-CM
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
## 2017-03-20 DIAGNOSIS — K43.9 VENTRAL HERNIA WITHOUT OBSTRUCTION OR GANGRENE: ICD-10-CM

## 2017-03-20 DIAGNOSIS — J44.1 COPD EXACERBATION (HCC): ICD-10-CM

## 2017-03-20 DIAGNOSIS — E86.0 DEHYDRATION: ICD-10-CM

## 2017-03-20 DIAGNOSIS — N18.9 CKD (CHRONIC KIDNEY DISEASE), UNSPECIFIED STAGE: ICD-10-CM

## 2017-03-20 LAB
BUN BLD-MCNC: 75 MG/DL (ref 8–20)
CALCIUM BLD-MCNC: 9.4 MG/DL (ref 8.3–10.3)
CHLORIDE: 101 MMOL/L (ref 101–111)
CO2: 24 MMOL/L (ref 22–32)
CREAT BLD-MCNC: 2.77 MG/DL (ref 0.7–1.3)
GLUCOSE BLD-MCNC: 130 MG/DL (ref 70–99)
POTASSIUM SERPL-SCNC: 3.7 MMOL/L (ref 3.6–5.1)
SODIUM SERPL-SCNC: 135 MMOL/L (ref 136–144)

## 2017-03-20 PROCEDURE — 80048 BASIC METABOLIC PNL TOTAL CA: CPT | Performed by: FAMILY MEDICINE

## 2017-03-20 PROCEDURE — 99310 SBSQ NF CARE HIGH MDM 45: CPT | Performed by: NURSE PRACTITIONER

## 2017-03-20 NOTE — PROGRESS NOTES
Liana Gee  : 1945  Age 66year old  male patient is admitted to Facility: [address] date:  3/9/17  Discharge date to [facility]:  3/15/17  ELOS:  7-10 days   Anticipated discharge date:  3/2 Medical History   Diagnosis Date   • UTI (urinary tract infection) 3/1/05   • Essential hypertension, benign 3/26/2012   • Type II or unspecified type diabetes mellitus without mention of complication, not stated as uncontrolled 10/26/2010   • Coronary ath • Anxiety state    • Atherosclerosis of coronary artery    • Visual impairment    • Hyperlipidemia    • Essential hypertension    • Malignant neoplasm of prostate ([facility] 3/26/2012     pt does not want radiation   • Cancer [facility])      prostate   • COPD (chr (three) times daily.  Disp: 270 capsule Rfl: 0   LOSARTAN POTASSIUM 100 MG Oral Tab TAKE ONE TABLET BY MOUTH ONCE DAILY Disp: 90 tablet Rfl: 0   Levothyroxine Sodium (SYNTHROID) 200 MCG Oral Tab Take 1 tablet (200 mcg total) by mouth before breakfast. Disp: any unusual skin lesions or rashes  WOUNDS: abdominal wound ventral hernia repair    EYES:no visual complaints or deficits  HENT: denies nasal congestion, sinus pain or sore throat;  RESPIRATORY: denies shortness of breath, wheezing or cough + COPD +BRITNEY on masses; no bruits; nontender, no guarding, no rebound tenderness.   :Deferred  LYMPHATIC:no lymphedema  MUSCULOSKELETAL: no acute synovitis upper or lower extremity  EXTREMITIES/VASCULAR:no cyanosis, clubbing   1+ bilateral lower extremity edema/ erythema on symbicort, albuterol prn, and O2 as needed. Currently saturation wnl. Lung sounds care clear to ausculation. Afebrile. Hx of cad/chf/htn. Patient on torsemide, metoprolol, clonidine, potassium, spironolactone and asa daily. Daily weights.  Vitals q s 3. Psych to follow     DM2   1. Gabapentin 300 mg tid   2. Metformin 1000 mg po bid   3. Accu check ac/hs     BRITNEY on Cpap/COPD  1. Symbicort 2 puffs twice daily prn   2. Albuterol 2 puffs every 6 hrs prn   3.  O2 as needed to keep saturation above 92%

## 2017-03-21 ENCOUNTER — LAB REQUISITION (OUTPATIENT)
Dept: LAB | Facility: HOSPITAL | Age: 72
End: 2017-03-21
Attending: FAMILY MEDICINE
Payer: MEDICARE

## 2017-03-21 DIAGNOSIS — R69 ILLNESS: ICD-10-CM

## 2017-03-21 LAB
BUN BLD-MCNC: 62 MG/DL (ref 8–20)
CALCIUM BLD-MCNC: 9.4 MG/DL (ref 8.3–10.3)
CHLORIDE: 104 MMOL/L (ref 101–111)
CO2: 26 MMOL/L (ref 22–32)
CREAT BLD-MCNC: 2.54 MG/DL (ref 0.7–1.3)
GLUCOSE BLD-MCNC: 135 MG/DL (ref 70–99)
POTASSIUM SERPL-SCNC: 4.5 MMOL/L (ref 3.6–5.1)
SODIUM SERPL-SCNC: 137 MMOL/L (ref 136–144)

## 2017-03-21 PROCEDURE — 80048 BASIC METABOLIC PNL TOTAL CA: CPT | Performed by: FAMILY MEDICINE

## 2017-03-21 PROCEDURE — 36415 COLL VENOUS BLD VENIPUNCTURE: CPT | Performed by: FAMILY MEDICINE

## 2017-03-22 ENCOUNTER — SNF VISIT (OUTPATIENT)
Dept: INTERNAL MEDICINE CLINIC | Age: 72
End: 2017-03-22

## 2017-03-22 ENCOUNTER — NURSE ONLY (OUTPATIENT)
Dept: LAB | Age: 72
End: 2017-03-22
Attending: FAMILY MEDICINE
Payer: MEDICARE

## 2017-03-22 VITALS
OXYGEN SATURATION: 99 % | RESPIRATION RATE: 20 BRPM | HEART RATE: 71 BPM | SYSTOLIC BLOOD PRESSURE: 126 MMHG | DIASTOLIC BLOOD PRESSURE: 57 MMHG | BODY MASS INDEX: 38 KG/M2 | WEIGHT: 300 LBS | TEMPERATURE: 99 F

## 2017-03-22 DIAGNOSIS — N17.9 AKI (ACUTE KIDNEY INJURY) (HCC): Primary | ICD-10-CM

## 2017-03-22 DIAGNOSIS — N18.9 CKD (CHRONIC KIDNEY DISEASE), UNSPECIFIED STAGE: ICD-10-CM

## 2017-03-22 DIAGNOSIS — R69 DIAGNOSIS UNKNOWN: Primary | ICD-10-CM

## 2017-03-22 DIAGNOSIS — R41.82 ALTERED MENTAL STATUS, UNSPECIFIED ALTERED MENTAL STATUS TYPE: ICD-10-CM

## 2017-03-22 DIAGNOSIS — R33.9 URINARY RETENTION: ICD-10-CM

## 2017-03-22 DIAGNOSIS — K43.9 VENTRAL HERNIA WITHOUT OBSTRUCTION OR GANGRENE: ICD-10-CM

## 2017-03-22 DIAGNOSIS — R53.1 WEAKNESS: ICD-10-CM

## 2017-03-22 PROCEDURE — 85025 COMPLETE CBC W/AUTO DIFF WBC: CPT

## 2017-03-22 PROCEDURE — 80053 COMPREHEN METABOLIC PANEL: CPT

## 2017-03-22 PROCEDURE — 36415 COLL VENOUS BLD VENIPUNCTURE: CPT

## 2017-03-22 PROCEDURE — 99309 SBSQ NF CARE MODERATE MDM 30: CPT | Performed by: NURSE PRACTITIONER

## 2017-03-22 RX ORDER — ACETAMINOPHEN 500 MG
500 TABLET ORAL EVERY 8 HOURS PRN
COMMUNITY
End: 2017-04-28 | Stop reason: ALTCHOICE

## 2017-03-23 ENCOUNTER — APPOINTMENT (OUTPATIENT)
Dept: GENERAL RADIOLOGY | Facility: HOSPITAL | Age: 72
End: 2017-03-23
Attending: STUDENT IN AN ORGANIZED HEALTH CARE EDUCATION/TRAINING PROGRAM
Payer: MEDICARE

## 2017-03-23 ENCOUNTER — APPOINTMENT (OUTPATIENT)
Dept: CT IMAGING | Facility: HOSPITAL | Age: 72
End: 2017-03-23
Attending: STUDENT IN AN ORGANIZED HEALTH CARE EDUCATION/TRAINING PROGRAM
Payer: MEDICARE

## 2017-03-23 ENCOUNTER — HOSPITAL ENCOUNTER (EMERGENCY)
Facility: HOSPITAL | Age: 72
Discharge: HOME | End: 2017-03-23
Attending: STUDENT IN AN ORGANIZED HEALTH CARE EDUCATION/TRAINING PROGRAM
Payer: MEDICARE

## 2017-03-23 VITALS
BODY MASS INDEX: 38 KG/M2 | DIASTOLIC BLOOD PRESSURE: 64 MMHG | WEIGHT: 300 LBS | TEMPERATURE: 98 F | OXYGEN SATURATION: 96 % | HEART RATE: 79 BPM | SYSTOLIC BLOOD PRESSURE: 165 MMHG | RESPIRATION RATE: 20 BRPM

## 2017-03-23 DIAGNOSIS — S16.1XXA NECK STRAIN, INITIAL ENCOUNTER: ICD-10-CM

## 2017-03-23 DIAGNOSIS — S09.90XA HEAD INJURY, INITIAL ENCOUNTER: ICD-10-CM

## 2017-03-23 DIAGNOSIS — S39.012A BACK STRAIN, INITIAL ENCOUNTER: ICD-10-CM
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
## 2017-03-23 PROCEDURE — 71010 XR CHEST AP/PA (1 VIEW) (CPT=71010): CPT

## 2017-03-23 PROCEDURE — 72125 CT NECK SPINE W/O DYE: CPT

## 2017-03-23 PROCEDURE — 99284 EMERGENCY DEPT VISIT MOD MDM: CPT

## 2017-03-23 PROCEDURE — 70450 CT HEAD/BRAIN W/O DYE: CPT

## 2017-03-23 PROCEDURE — 72100 X-RAY EXAM L-S SPINE 2/3 VWS: CPT

## 2017-03-23 PROCEDURE — 72072 X-RAY EXAM THORAC SPINE 3VWS: CPT

## 2017-03-23 NOTE — PROGRESS NOTES
Dora Kinney, 11/20/1945, 66year old, male    Chief Complaint:  Patient presents with:   Follow - Up: s/p ventral hernia repair  Abnormal Labs  Weakness       Subjective:  PMH of HTN, T2 DM w/ PN, CAD, BRITNEY on Cpap, COPD, HTN w/ CKD, DL, CHF, venous ins masses; no bruits; nontender, no guarding, no rebound tenderness.   :Deferred  LYMPHATIC:no lymphedema  MUSCULOSKELETAL: no acute synovitis upper or lower extremity  EXTREMITIES/VASCULAR:no cyanosis, clubbing    1+ bilateral lower extremity edema/ erythem request as pt is denying pain. Add Tylenol prn. Assessment and plan:  S/P Ventral hernia repair per Dr Conchita Kimball on 3.9 / Cellulitis    1. Wound Rn to follow    2. Keflex 500 mg tid until 3.23  3. DC Norco   4.  Tylenol ES 1-2 tabs q 8 hrs prn    5. F/U [employer]

## 2017-03-23 NOTE — ED PROVIDER NOTES
Patient Seen in: [facility] Emergency Department    History   Patient presents with:  Fall (musculoskeletal, neurologic)    Stated Complaint: fall    HPI    Patient is a 19-year-old gentleman who presents emergency department after having fallen out disease)    • Metabolic syndrome    • Acute sinusitis 1/4/12   • Tobacco abuse    • Gallstone 11/08/07     on ct small stone   • Atelectasis 11/6/07     on chest x-ray minimal right perihilar    • Enlarged thyroid    • Tracheal deviation 11/6/07     to rig (300 mg total) by mouth 3 (three) times daily.    LOSARTAN POTASSIUM 100 MG Oral Tab,  TAKE ONE TABLET BY MOUTH ONCE DAILY   Levothyroxine Sodium (SYNTHROID) 200 MCG Oral Tab,  Take 1 tablet (200 mcg total) by mouth before breakfast.   Ipratropium Bromide 0 reviewed and negative except as noted above. PSFH elements reviewed from today and agreed except as otherwise stated in HPI.     Physical Exam       ED Triage Vitals   BP 03/23/17 0225 153/74 mmHg   Pulse 03/23/17 0225 85   Resp 03/23/17 0225 20   Temp 0 dislocation. Incidental findings of degenerative changes in the cervical spine were noted. X-rays of the chest as well as lumbar spine and thoracic spine were obtained. No acute fracture or dislocation was identified.     Patient discharged back to [facility]

## 2017-03-23 NOTE — ED INITIAL ASSESSMENT (HPI)
71YM c/c of fall NH reports pt fall out of his wheel chair tonight. Pt fall was witness at [facility] no LOC Pt is complaining of head,neck and back pain,.  Pt is acting within his normal mental abilities

## 2017-03-24 ENCOUNTER — SNF VISIT (OUTPATIENT)
Dept: INTERNAL MEDICINE CLINIC | Age: 72
End: 2017-03-24

## 2017-03-24 ENCOUNTER — NURSE ONLY (OUTPATIENT)
Dept: LAB | Age: 72
End: 2017-03-24
Attending: FAMILY MEDICINE
Payer: MEDICARE

## 2017-03-24 VITALS
SYSTOLIC BLOOD PRESSURE: 144 MMHG | DIASTOLIC BLOOD PRESSURE: 64 MMHG | TEMPERATURE: 97 F | RESPIRATION RATE: 18 BRPM | HEART RATE: 70 BPM

## 2017-03-24 DIAGNOSIS — N18.9 CKD (CHRONIC KIDNEY DISEASE), UNSPECIFIED STAGE: ICD-10-CM

## 2017-03-24 DIAGNOSIS — C61 MALIGNANT NEOPLASM OF PROSTATE (HCC): Primary | ICD-10-CM

## 2017-03-24 DIAGNOSIS — N17.9 AKI (ACUTE KIDNEY INJURY) (HCC): Primary | ICD-10-CM

## 2017-03-24 DIAGNOSIS — G93.0 INTRACRANIAL ARACHNOID CYST: ICD-10-CM

## 2017-03-24 DIAGNOSIS — R33.9 URINARY RETENTION: ICD-10-CM
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
## 2017-03-24 DIAGNOSIS — K43.9 VENTRAL HERNIA WITHOUT OBSTRUCTION OR GANGRENE: ICD-10-CM

## 2017-03-24 PROCEDURE — 85025 COMPLETE CBC W/AUTO DIFF WBC: CPT

## 2017-03-24 PROCEDURE — 36415 COLL VENOUS BLD VENIPUNCTURE: CPT

## 2017-03-24 PROCEDURE — 99309 SBSQ NF CARE MODERATE MDM 30: CPT | Performed by: NURSE PRACTITIONER

## 2017-03-24 PROCEDURE — 80053 COMPREHEN METABOLIC PANEL: CPT

## 2017-03-25 ENCOUNTER — SNF ADMIT/H&P (OUTPATIENT)
Dept: FAMILY MEDICINE CLINIC | Facility: CLINIC | Age: 72
End: 2017-03-25

## 2017-03-25 DIAGNOSIS — R41.82 ALTERED MENTAL STATUS, UNSPECIFIED ALTERED MENTAL STATUS TYPE: ICD-10-CM

## 2017-03-25 DIAGNOSIS — G47.33 OSA ON CPAP: ICD-10-CM

## 2017-03-25 DIAGNOSIS — E03.9 HYPOTHYROIDISM, UNSPECIFIED TYPE: ICD-10-CM

## 2017-03-25 DIAGNOSIS — F32.A DEPRESSION, UNSPECIFIED DEPRESSION TYPE: ICD-10-CM

## 2017-03-25 DIAGNOSIS — Z95.5 S/P CORONARY ARTERY STENT PLACEMENT: ICD-10-CM

## 2017-03-25 DIAGNOSIS — I87.2 VENOUS INSUFFICIENCY: ICD-10-CM

## 2017-03-25 DIAGNOSIS — R60.9 PERIPHERAL EDEMA: ICD-10-CM

## 2017-03-25 DIAGNOSIS — F41.0 PANIC DISORDER: ICD-10-CM

## 2017-03-25 DIAGNOSIS — E11.69 DIABETES MELLITUS TYPE 2 IN OBESE (HCC): ICD-10-CM

## 2017-03-25 DIAGNOSIS — I25.110 ATHEROSCLEROSIS OF CORONARY ARTERY OF NATIVE HEART WITH UNSTABLE ANGINA PECTORIS, UNSPECIFIED VESSEL OR LESION TYPE (HCC): ICD-10-CM

## 2017-03-25 DIAGNOSIS — E66.01 MORBID OBESITY, UNSPECIFIED OBESITY TYPE (HCC): ICD-10-CM

## 2017-03-25 DIAGNOSIS — I10 ESSENTIAL HYPERTENSION, BENIGN: ICD-10-CM

## 2017-03-25 DIAGNOSIS — Z98.890 S/P REPAIR OF VENTRAL HERNIA: ICD-10-CM

## 2017-03-25 DIAGNOSIS — J42 CHRONIC BRONCHITIS, UNSPECIFIED CHRONIC BRONCHITIS TYPE (HCC): ICD-10-CM

## 2017-03-25 DIAGNOSIS — Z91.81 AT RISK FOR FALLING: ICD-10-CM

## 2017-03-25 DIAGNOSIS — Z99.89 OSA ON CPAP: ICD-10-CM

## 2017-03-25 DIAGNOSIS — E78.00 HYPERCHOLESTEROLEMIA: ICD-10-CM

## 2017-03-25 DIAGNOSIS — R53.1 GENERALIZED WEAKNESS: ICD-10-CM

## 2017-03-25 DIAGNOSIS — Z87.19 S/P REPAIR OF VENTRAL HERNIA: ICD-10-CM

## 2017-03-25 DIAGNOSIS — E11.9 TYPE 2 DIABETES MELLITUS WITHOUT COMPLICATION, UNSPECIFIED LONG TERM INSULIN USE STATUS: ICD-10-CM

## 2017-03-25 DIAGNOSIS — I50.9 ACUTE ON CHRONIC CONGESTIVE HEART FAILURE, UNSPECIFIED CONGESTIVE HEART FAILURE TYPE: ICD-10-CM

## 2017-03-25 DIAGNOSIS — C61 MALIGNANT NEOPLASM OF PROSTATE (HCC): Primary | ICD-10-CM

## 2017-03-25 DIAGNOSIS — R33.9 URINARY RETENTION: ICD-10-CM

## 2017-03-25 DIAGNOSIS — E66.9 DIABETES MELLITUS TYPE 2 IN OBESE (HCC): ICD-10-CM

## 2017-03-25 PROCEDURE — 99306 1ST NF CARE HIGH MDM 50: CPT | Performed by: FAMILY MEDICINE

## 2017-03-25 NOTE — PROGRESS NOTES
Alize Valle, 11/20/1945, 66year old, male    Chief Complaint:  Patient presents with:   Follow - Up: voiding trial  Abnormal Labs       Subjective:  PMH of HTN, T2 DM w/ PN, CAD, BRITNEY on Cpap, COPD, HTN w/ CKD, DL, CHF, venous insufficiency, hx of pros tenderness.   :Deferred  LYMPHATIC:no lymphedema  MUSCULOSKELETAL: no acute synovitis upper or lower extremity  EXTREMITIES/VASCULAR:no cyanosis, clubbing    Trace bilateral lower extremity edema/ erythema/no warmth hx of pvd    NEUROLOGIC: intact; no sen XR  Findings:      Alignment is normal.      Vertebral body heights are maintained throughout the thoracic spine.      Disc spaces are maintained throughout the thoracic spine.      Marginal osteophytes are noted throughout the thoracic spine.      IMPRESS month    Confusion/Multiple falls/arachnoid cyst  1. UA=negative  2. Fall precautions  3. Clonazepam to 0.5 mg po qid  4. C diff collected diarrhea on 3.19- neg    5. Consult neurology/neurospsych    Hx of prostate ca/Urinary retention    1.  Tamsulosin hcl

## 2017-03-25 NOTE — PROGRESS NOTES
[address]: Lg Andrea MD     1945 MRN [id number]   [facility]  Admission 3/23/17      Last Hospital Discharge 3/23/17 PCP Lg Andrea [facility] Dis[facility] 3/23/17       Date of Admission: 2/ old male admitted to SNF for sub-acute rehabilitation.       Chief Complaint: Patient presents with:  Hospital F/U: [facility] H&P        HPI   65 yo male presenting to [address] s/p robotic assisted ventral hernia repair wi 0.03 % Nasal Solution 1-2 sprays each nostril every 12 hours PRN   torsemide 20 MG Oral Tab Take 1 tablet (20 mg total) by mouth 2 (two) times daily.    METOPROLOL SUCCINATE ER 50 MG Oral Tablet 24 Hr TAKE ONE TABLET BY MOUTH ONCE DAILY   simvastatin 40 MG disease; Elevated PSA; Hemorrhoids; Cerumen impaction (5/26/05); Hypercholesterolemia; History of noncompliance with medical treatment (9/07); Aneurysm ([facility] [address] CAD (coronary artery disease); Metabolic syndrome; Acute sinusitis (1/4/12);  Tobacco abuse; Zhen Mayen exudate. Eyes: Conjunctivae and EOM are normal. Right eye exhibits no discharge. Left eye exhibits no discharge. No scleral icterus. Neck: Normal range of motion. Neck supple. No JVD present. No tracheal deviation present. No thyromegaly present.    Car is unremarkable. OTHER:  None. 3/23/2017  CONCLUSION:  No change since prior exam. Mildly prominent cardiac silhouette is stable. Agree with preliminary radiology report from [address] [place] [address] radiology.      Dictated by: Benjamin Shaffer MD on 3/23/2017 at 7:3 semilunar extra-axial fluid mostly likely arachnoid cyst, 17 x 41 mm, and the right parietal region. This appears stable. Mild chronic ischemic white matter changes are seen. No skull fracture seen. No evidence for acute sinusitis, or blood in the sinuses. 3/23/2017, 3:04. INDICATIONS:  fall  TECHNIQUE:  Noncontrast CT scanning of the cervical spine is performed from the skull base through C7. Multiplanar reconstructions are generated. Dose reduction techniques were used.  Dose information is transmitted t cm radiopaque density in the right upper quadrant is noted.  IMPRESSION: No significant change since prior exam. 1.5 cm density in the right upper quadrant may correlate with gallstone visualized on prior CT exam.  Dictated by: Gabriella Fuller MD on 3/23/ 1. 30-6.70 x10(3) uL   Lymphocyte Absolute 2.17 0.90-4.00 x10(3) uL   Monocyte Absolute 0.90 (H) 0.10-0.60 x10(3) uL   Eosinophil Absolute 0.54 (H) 0.00-0.30 x10(3) uL   Basophil Absolute 0.12 (H) 0.00-0.10 x10(3) uL   Immature Granulocyte Absolute 0.23 0.0 congestive heart failure type (HCC)  -CPM, stable. 17. Hypothyroidism, unspecified type  -continue synthroid. 18. Morbid obesity, unspecified obesity type ([facility] [address]  -OP weight loss.      19. Venous insufficiency  -CPM, stable,     20. Peripheral edema

## 2017-03-27 ENCOUNTER — LAB REQUISITION (OUTPATIENT)
Dept: LAB | Facility: HOSPITAL | Age: 72
End: 2017-03-27
Attending: FAMILY MEDICINE
Payer: MEDICARE

## 2017-03-27 ENCOUNTER — SNF VISIT (OUTPATIENT)
Dept: INTERNAL MEDICINE CLINIC | Age: 72
End: 2017-03-27

## 2017-03-27 VITALS
DIASTOLIC BLOOD PRESSURE: 75 MMHG | WEIGHT: 297 LBS | SYSTOLIC BLOOD PRESSURE: 130 MMHG | RESPIRATION RATE: 18 BRPM | HEART RATE: 84 BPM | TEMPERATURE: 97 F | OXYGEN SATURATION: 97 % | BODY MASS INDEX: 37 KG/M2

## 2017-03-27 DIAGNOSIS — K43.9 VENTRAL HERNIA WITHOUT OBSTRUCTION OR GANGRENE: ICD-10-CM

## 2017-03-27 DIAGNOSIS — R41.82 ALTERED MENTAL STATUS, UNSPECIFIED ALTERED MENTAL STATUS TYPE: ICD-10-CM

## 2017-03-27 DIAGNOSIS — M54.50 ACUTE BILATERAL LOW BACK PAIN WITHOUT SCIATICA: ICD-10-CM

## 2017-03-27 DIAGNOSIS — R33.9 URINARY RETENTION: Primary | ICD-10-CM

## 2017-03-27 DIAGNOSIS — R41.82 ALTERED MENTAL STATUS: ICD-10-CM

## 2017-03-27 LAB
ALBUMIN SERPL-MCNC: 3.2 G/DL (ref 3.5–4.8)
ALP LIVER SERPL-CCNC: 107 U/L (ref 45–117)
ALT SERPL-CCNC: 34 U/L (ref 17–63)
AST SERPL-CCNC: 22 U/L (ref 15–41)
BASOPHILS # BLD AUTO: 0.07 X10(3) UL (ref 0–0.1)
BASOPHILS NFR BLD AUTO: 0.6 %
BILIRUB SERPL-MCNC: 0.4 MG/DL (ref 0.1–2)
BUN BLD-MCNC: 29 MG/DL (ref 8–20)
CALCIUM BLD-MCNC: 9.6 MG/DL (ref 8.3–10.3)
CHLORIDE: 105 MMOL/L (ref 101–111)
CO2: 23 MMOL/L (ref 22–32)
CREAT BLD-MCNC: 1.98 MG/DL (ref 0.7–1.3)
EOSINOPHIL # BLD AUTO: 0.53 X10(3) UL (ref 0–0.3)
EOSINOPHIL NFR BLD AUTO: 4.6 %
ERYTHROCYTE [DISTWIDTH] IN BLOOD BY AUTOMATED COUNT: 13.2 % (ref 11.5–16)
GLUCOSE BLD-MCNC: 147 MG/DL (ref 70–99)
HCT VFR BLD AUTO: 35.4 % (ref 37–53)
HGB BLD-MCNC: 11.9 G/DL (ref 13–17)
IMMATURE GRANULOCYTE COUNT: 0.1 X10(3) UL (ref 0–1)
IMMATURE GRANULOCYTE RATIO %: 0.9 %
LYMPHOCYTES # BLD AUTO: 2.42 X10(3) UL (ref 0.9–4)
LYMPHOCYTES NFR BLD AUTO: 20.8 %
M PROTEIN MFR SERPL ELPH: 6.9 G/DL (ref 6.1–8.3)
MCH RBC QN AUTO: 31.1 PG (ref 27–33.2)
MCHC RBC AUTO-ENTMCNC: 33.6 G/DL (ref 31–37)
MCV RBC AUTO: 92.4 FL (ref 80–99)
MONOCYTES # BLD AUTO: 0.98 X10(3) UL (ref 0.1–0.6)
MONOCYTES NFR BLD AUTO: 8.4 %
NEUTROPHIL ABS PRELIM: 7.52 X10 (3) UL (ref 1.3–6.7)
NEUTROPHILS # BLD AUTO: 7.52 X10(3) UL (ref 1.3–6.7)
NEUTROPHILS NFR BLD AUTO: 64.7 %
PLATELET # BLD AUTO: 330 10(3)UL (ref 150–450)
POTASSIUM SERPL-SCNC: 4.5 MMOL/L (ref 3.6–5.1)
RBC # BLD AUTO: 3.83 X10(6)UL (ref 3.8–5.8)
RED CELL DISTRIBUTION WIDTH-SD: 44.2 FL (ref 35.1–46.3)
SODIUM SERPL-SCNC: 137 MMOL/L (ref 136–144)
WBC # BLD AUTO: 11.6 X10(3) UL (ref 4–13)

## 2017-03-27 PROCEDURE — 99309 SBSQ NF CARE MODERATE MDM 30: CPT | Performed by: NURSE PRACTITIONER

## 2017-03-27 PROCEDURE — 80053 COMPREHEN METABOLIC PANEL: CPT | Performed by: FAMILY MEDICINE

## 2017-03-27 PROCEDURE — 85025 COMPLETE CBC W/AUTO DIFF WBC: CPT | Performed by: FAMILY MEDICINE

## 2017-03-27 PROCEDURE — 87086 URINE CULTURE/COLONY COUNT: CPT | Performed by: UROLOGY

## 2017-03-27 NOTE — PROGRESS NOTES
Wm Godoyn, 11/20/1945, 66year old, male    Chief Complaint:  Patient presents with:   Follow - Up: urinary retention  Low Back Pain       Subjective:  PMH of HTN, T2 DM w/ PN, CAD, BRITNEY on Cpap, COPD, HTN w/ CKD, DL, CHF, venous insufficiency, hx of tenderness. :Deferred.   Hutson to leg bag  LYMPHATIC:no lymphedema  MUSCULOSKELETAL: no acute synovitis upper or lower extremity  EXTREMITIES/VASCULAR:no cyanosis, clubbing    Trace bilateral lower extremity edema/ erythema/no warmth hx of pvd    NEUROLO retention    1. Tamsulosin hcl 0.4 mg, 2 tabs po daily    2. Finasteride 5 mg po daily    3. Straight cath q shift until f/u appt   4. Urology follow up ~4.10.17 for repeat voiding trial    Anxiety/depression  1. Duloxetine 60 mg po daily  2. Clonazepam 0.

## 2017-03-29 ENCOUNTER — SNF DISCHARGE (OUTPATIENT)
Dept: INTERNAL MEDICINE CLINIC | Age: 72
End: 2017-03-29

## 2017-03-29 VITALS
HEART RATE: 80 BPM | OXYGEN SATURATION: 97 % | RESPIRATION RATE: 16 BRPM | SYSTOLIC BLOOD PRESSURE: 140 MMHG | TEMPERATURE: 98 F | DIASTOLIC BLOOD PRESSURE: 80 MMHG

## 2017-03-29 DIAGNOSIS — G47.33 OSA ON CPAP: ICD-10-CM

## 2017-03-29 DIAGNOSIS — R53.1 WEAKNESS: ICD-10-CM

## 2017-03-29 DIAGNOSIS — N18.9 CKD (CHRONIC KIDNEY DISEASE), UNSPECIFIED STAGE: ICD-10-CM

## 2017-03-29 DIAGNOSIS — E03.9 HYPOTHYROIDISM, UNSPECIFIED TYPE: ICD-10-CM

## 2017-03-29 DIAGNOSIS — R33.9 URINARY RETENTION: ICD-10-CM

## 2017-03-29 DIAGNOSIS — K43.9 VENTRAL HERNIA WITHOUT OBSTRUCTION OR GANGRENE: Primary | ICD-10-CM

## 2017-03-29 DIAGNOSIS — C61 PROSTATE CANCER (HCC): ICD-10-CM

## 2017-03-29 DIAGNOSIS — I25.10 CORONARY ARTERY DISEASE INVOLVING NATIVE CORONARY ARTERY OF NATIVE HEART WITHOUT ANGINA PECTORIS: ICD-10-CM

## 2017-03-29 DIAGNOSIS — J44.1 COPD EXACERBATION (HCC): ICD-10-CM
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
## 2017-03-29 DIAGNOSIS — Z99.89 OSA ON CPAP: ICD-10-CM

## 2017-03-29 DIAGNOSIS — M54.50 ACUTE BILATERAL LOW BACK PAIN WITHOUT SCIATICA: ICD-10-CM

## 2017-03-29 DIAGNOSIS — I50.9 ACUTE ON CHRONIC CONGESTIVE HEART FAILURE, UNSPECIFIED CONGESTIVE HEART FAILURE TYPE: ICD-10-CM

## 2017-03-29 DIAGNOSIS — R41.82 ALTERED MENTAL STATUS, UNSPECIFIED ALTERED MENTAL STATUS TYPE: ICD-10-CM

## 2017-03-29 DIAGNOSIS — G93.0 INTRACRANIAL ARACHNOID CYST: ICD-10-CM

## 2017-03-29 DIAGNOSIS — E11.65 TYPE 2 DIABETES MELLITUS WITH HYPERGLYCEMIA, WITHOUT LONG-TERM CURRENT USE OF INSULIN (HCC): ICD-10-CM

## 2017-03-29 PROCEDURE — 99316 NF DSCHRG MGMT 30 MIN+: CPT | Performed by: NURSE PRACTITIONER

## 2017-03-29 NOTE — PROGRESS NOTES
Francisco Javier Dow, 11/20/1945, 66year old, male is being discharged from Facility: [address]    Date of Admission:  3.15.17    Date of Discharge:  3.29.17                                 Admitting Diagnoses:  1.  S/P ve bag  SKIN: no rashes, no suspicious lesions  + bilateral erythema/scabs to lower extremities hx of venous insufficiency    WOUND:    + Abdominal ventral hernia repair/no erythema/warmth or drainage    EYES: PERRLA, EOMI, sclera anicteric, conjunctiva angie 33* 03/27/2017   GFRNAA 94 04/15/2014   GFRAA 109 04/15/2014   CA 9.6 03/27/2017   ALKPHO 107 03/27/2017   AST 22 03/27/2017   ALT 34 03/27/2017   BILT 0.4 03/27/2017   TP 6.9 03/27/2017   ALB 3.2* 03/27/2017    03/27/2017   K 4.5 03/27/2017    daily prn    2. Albuterol 2 puffs every 6 hrs prn      CAD/CHF/HTN  1. Torsemide 20 mg twice daily  2. Metoprolol succinate 50 mg po daily    3. Clonidine 0.1 mg po bid    4. Potassium chloride 40 meq po daily    5. Spironolactone 50 mg po daily  6.  ASA 81

## 2017-04-03 ENCOUNTER — TELEPHONE (OUTPATIENT)
Dept: FAMILY MEDICINE CLINIC | Facility: CLINIC | Age: 72
End: 2017-04-03

## 2017-04-03 NOTE — TELEPHONE ENCOUNTER
He can send me a request for occupational therapy and physical therapy and I will sign off. He can also try MiraLAX along with Colace 100 mg twice daily as needed for constipation.

## 2017-04-03 NOTE — TELEPHONE ENCOUNTER
Cat from [facility] regarding the above patient. She stated that the patient was admitted from 03/09- 03-15-17. Eric is requesting a  to help him at home.  He needs help with medication, food,and his disabled son that live

## 2017-04-04 ENCOUNTER — TELEPHONE (OUTPATIENT)
Dept: FAMILY MEDICINE CLINIC | Facility: CLINIC | Age: 72
End: 2017-04-04

## 2017-04-04 ENCOUNTER — HOSPITAL ENCOUNTER (EMERGENCY)
Facility: HOSPITAL | Age: 72
Discharge: HOME | End: 2017-04-04
Attending: EMERGENCY MEDICINE
Payer: MEDICARE

## 2017-04-04 VITALS
OXYGEN SATURATION: 95 % | BODY MASS INDEX: 37.3 KG/M2 | DIASTOLIC BLOOD PRESSURE: 54 MMHG | HEIGHT: 75 IN | HEART RATE: 73 BPM | WEIGHT: 300 LBS | RESPIRATION RATE: 22 BRPM | SYSTOLIC BLOOD PRESSURE: 146 MMHG | TEMPERATURE: 99 F

## 2017-04-04 DIAGNOSIS — L03.119 CELLULITIS OF LOWER EXTREMITY, UNSPECIFIED LATERALITY: ICD-10-CM

## 2017-04-04 DIAGNOSIS — R33.9 URINARY RETENTION: Primary | ICD-10-CM

## 2017-04-04 PROCEDURE — 99284 EMERGENCY DEPT VISIT MOD MDM: CPT

## 2017-04-04 PROCEDURE — 99283 EMERGENCY DEPT VISIT LOW MDM: CPT

## 2017-04-04 RX ORDER — DOXYCYCLINE HYCLATE 100 MG/1
100 CAPSULE ORAL 2 TIMES DAILY
Qty: 20 CAPSULE | Refills: 0 | Status: SHIPPED | OUTPATIENT
Start: 2017-04-04 | End: 2017-04-10 | Stop reason: ALTCHOICE

## 2017-04-04 NOTE — ED PROVIDER NOTES
Patient Seen in: [facility] Emergency Department    History   Patient presents with:  Cath Tube Problem (gastrointestinal, urinary, integumentary)    Stated Complaint: abdominal pain/catheter pain    HPI    69-year-old male presents with several félix Coronary disease      s/p LAD stent   • Elevated PSA    • Hemorrhoids    • Cerumen impaction 5/26/05     right   • Hypercholesterolemia    • History of noncompliance with medical treatment 9/07   • Aneurysm ([address]      large,infrarenal   • CAD (coronary art Application topically 2 (two) times daily. tamsulosin HCl 0.4 MG Oral Cap,  Take 2 capsules (0.8 mg total) by mouth daily. aspirin 81 MG Oral Tab,  Take 81 mg by mouth daily.    acetaminophen 500 MG Oral Tab,  Take 500 mg by mouth every 8 (eight) hours E11.9       Family History   Problem Relation Age of Onset   • Heart Attack Father    • bone cancer[other] [OTHER] Mother          Smoking Status: Current Every Day Smoker        Packs/Day: 0.50  Years: 39        Types: Cigarettes    Smokeless Status: Cinthya Stratton distended urinary bladder. Catheter was flushed and advanced 6-8 cm by the RN and patient had return of 850 mL's of clear urine. Discussed with patient's urologist who would like to follow-up with him in clinic next week.   He requests that we inflate the

## 2017-04-04 NOTE — TELEPHONE ENCOUNTER
If I'm seeing him tomorrow, would prefer to see him first before prescribing any sleep aids. I will see him tomorrow. In the meantime, he can take melatonin OTC.

## 2017-04-04 NOTE — ED INITIAL ASSESSMENT (HPI)
Patient presents with umbilical abdominal pain since hernia repair approximately 3 weeks ago. Patient has urinary catheter in place that was scheduled to be removed. Patient saw Dr Amarilys Bentley yesterday and catheter was changed.  Patient repeatedly states \"I

## 2017-04-04 NOTE — ED NOTES
Total urine output from catheter 850ml. Hutson catheter secured using stat lock that was previously placed by Urology. Balloon is inflated.

## 2017-04-04 NOTE — ED NOTES
This RN at bedside to adjust luther catheter to obtain urine output. Tubing from luther catheter noted to be loose between urinary meatus and stat lock placed by Urology PA yesterday and dried blood on catheter tubing and patient's undergarments.  Attempted t

## 2017-04-04 NOTE — TELEPHONE ENCOUNTER
Pt is in ER with catheter difficulties. Pt scheduled HFU tomorrow but son Marcus Sheikh is asking if pt can be prescribed med to help him sleep. He is very agitated and has not slept in five days.

## 2017-04-05 ENCOUNTER — TELEPHONE (OUTPATIENT)
Dept: FAMILY MEDICINE CLINIC | Facility: CLINIC | Age: 72
End: 2017-04-05

## 2017-04-05 ENCOUNTER — HOSPITAL ENCOUNTER (EMERGENCY)
Facility: HOSPITAL | Age: 72
Discharge: HOME | End: 2017-04-05
Attending: EMERGENCY MEDICINE
Payer: MEDICARE

## 2017-04-05 VITALS
DIASTOLIC BLOOD PRESSURE: 83 MMHG | SYSTOLIC BLOOD PRESSURE: 167 MMHG | OXYGEN SATURATION: 97 % | RESPIRATION RATE: 20 BRPM | WEIGHT: 297 LBS | HEART RATE: 107 BPM | TEMPERATURE: 100 F | BODY MASS INDEX: 37 KG/M2

## 2017-04-05 DIAGNOSIS — R33.9 URINARY RETENTION: Primary | ICD-10-CM

## 2017-04-05 PROCEDURE — 51702 INSERT TEMP BLADDER CATH: CPT

## 2017-04-05 PROCEDURE — 99283 EMERGENCY DEPT VISIT LOW MDM: CPT

## 2017-04-05 RX ORDER — HYDROCODONE BITARTRATE AND ACETAMINOPHEN 5; 325 MG/1; MG/1
1 TABLET ORAL EVERY 6 HOURS PRN
Qty: 60 TABLET | Refills: 0 | Status: SHIPPED | OUTPATIENT
Start: 2017-04-05 | End: 2017-04-28 | Stop reason: ALTCHOICE

## 2017-04-05 NOTE — TELEPHONE ENCOUNTER
Pt son transferred to nurse. He is not on hippa. He is asking to speak with Dr Dorene Wilson regarding his dad.  He is asking why Dr Dorene Wilson prescribed pain med for him when 3 different dr's have told him not to take pain meds as he was in a \"medical state of dileriu

## 2017-04-05 NOTE — TELEPHONE ENCOUNTER
Pt cancelled his appt today. Would like to talk to Dr. Gabrielle Drake about his prostate. States he has had 4 or 5 operations in the last 5 days and would like to talk to Dr. Gabrielle Drake about this.

## 2017-04-05 NOTE — TELEPHONE ENCOUNTER
Call patient back this morning. He recently underwent prostate surgery and was seen in the ER last night due to urinary retention and Hutson catheter not draining properly.   He was discharged home from the ER and states that the Hutson catheter is finally d

## 2017-04-05 NOTE — ED INITIAL ASSESSMENT (HPI)
71Ym c/c of cath problem Pt state he was seen here yesterday morning for urinary retation and had a new cath placed Pt state he back tonight for the same

## 2017-04-05 NOTE — ED PROVIDER NOTES
Patient Seen in: [facility] Emergency Department    History   Patient presents with:  Cath Tube Problem (gastrointestinal, urinary, integumentary)    Stated Complaint: cath problem     HPI    79-year-old male with a history of urinary tract infectio proximal left anterior descending coronary artery, occluded OM branch of circumflex coronary artery    • Atherosclerotic heart disease 9/27/2007     bifemoral atherosclerotic iliac disease   • Depression    • Coronary disease      s/p LAD stent   • Elevate cataract       Medications :   Doxycycline Hyclate 100 MG Oral Cap,  Take 1 capsule (100 mg total) by mouth 2 (two) times daily. nystatin-triamcinolone 971964-5.0 UNIT/GM-% External Ointment,  Apply 1 Application topically 2 (two) times daily.    tamsulos 108 (90 BASE) MCG/ACT Inhalation Aero Soln,  Inhale 2 puffs into the lungs every 6 (six) hours as needed for Wheezing. FREESTYLE LANCETS Does not apply Misc,  Pt. Tests four times daily .  Dx code E11.9       Family History   Problem Relation Age of Onset no clubbing, cyanosis, edema. Neurologic exam: Cranial nerves 2-12 are intact. There is no focal motor deficit noted. Skin exam: Warm to touch. Good skin turgor. No lacerations, abrasions, lesions noted.     ED Course   Labs Reviewed - No data to displ

## 2017-04-05 NOTE — TELEPHONE ENCOUNTER
Pt aware you will speak with him later today but he will be calling Dr Shlomo Packer now with symptom update. Pt states he has difficulty standing.

## 2017-04-06 ENCOUNTER — HOSPITAL ENCOUNTER (EMERGENCY)
Facility: HOSPITAL | Age: 72
Discharge: HOME | End: 2017-04-06
Attending: EMERGENCY MEDICINE
Payer: MEDICARE

## 2017-04-06 VITALS
DIASTOLIC BLOOD PRESSURE: 60 MMHG | BODY MASS INDEX: 36.92 KG/M2 | HEART RATE: 72 BPM | TEMPERATURE: 98 F | RESPIRATION RATE: 18 BRPM | HEIGHT: 75.14 IN | OXYGEN SATURATION: 98 % | SYSTOLIC BLOOD PRESSURE: 158 MMHG | WEIGHT: 296.94 LBS

## 2017-04-06 DIAGNOSIS — R33.9 URINARY RETENTION: Primary | ICD-10-CM

## 2017-04-06 PROCEDURE — 80053 COMPREHEN METABOLIC PANEL: CPT | Performed by: EMERGENCY MEDICINE

## 2017-04-06 PROCEDURE — 87186 SC STD MICRODIL/AGAR DIL: CPT | Performed by: EMERGENCY MEDICINE

## 2017-04-06 PROCEDURE — 51702 INSERT TEMP BLADDER CATH: CPT

## 2017-04-06 PROCEDURE — 87077 CULTURE AEROBIC IDENTIFY: CPT | Performed by: EMERGENCY MEDICINE

## 2017-04-06 PROCEDURE — 96374 THER/PROPH/DIAG INJ IV PUSH: CPT

## 2017-04-06 PROCEDURE — 99284 EMERGENCY DEPT VISIT MOD MDM: CPT

## 2017-04-06 PROCEDURE — 85025 COMPLETE CBC W/AUTO DIFF WBC: CPT | Performed by: EMERGENCY MEDICINE

## 2017-04-06 PROCEDURE — 81001 URINALYSIS AUTO W/SCOPE: CPT | Performed by: EMERGENCY MEDICINE

## 2017-04-06 PROCEDURE — 99285 EMERGENCY DEPT VISIT HI MDM: CPT

## 2017-04-06 PROCEDURE — 87086 URINE CULTURE/COLONY COUNT: CPT | Performed by: EMERGENCY MEDICINE

## 2017-04-06 RX ORDER — HYDROMORPHONE HYDROCHLORIDE 1 MG/ML
1 INJECTION, SOLUTION INTRAMUSCULAR; INTRAVENOUS; SUBCUTANEOUS ONCE
Status: COMPLETED | OUTPATIENT
Start: 2017-04-06 | End: 2017-04-06

## 2017-04-06 RX ORDER — CEPHALEXIN 500 MG/1
500 CAPSULE ORAL 4 TIMES DAILY
Qty: 28 CAPSULE | Refills: 0 | Status: SHIPPED | OUTPATIENT
Start: 2017-04-06 | End: 2017-04-10

## 2017-04-06 NOTE — ED INITIAL ASSESSMENT (HPI)
Hutson catheter coming loose for the past week. Was in ER last night. Catheter was cleaned out and re inserted.

## 2017-04-06 NOTE — ED PROVIDER NOTES
Patient Seen in: [facility] Emergency Department    History   Patient presents with:  Cath Tube Problem (gastrointestinal, urinary, integumentary)    Stated Complaint: luther catheter not draining    HPI    The patient is a 44-year-old male with mult 9/07   • Aneurysm (HCC)      large,infrarenal   • CAD (coronary artery disease)    • Metabolic syndrome    • Acute sinusitis 1/4/12   • Tobacco abuse    • Gallstone 11/08/07     on ct small stone   • Atelectasis 11/6/07     on chest x-ray minimal right per Ointment,  Apply 1 Application topically 2 (two) times daily. tamsulosin HCl 0.4 MG Oral Cap,  Take 2 capsules (0.8 mg total) by mouth daily. aspirin 81 MG Oral Tab,  Take 81 mg by mouth daily.    acetaminophen 500 MG Oral Tab,  Take 500 mg by mouth nehemiah daily .  Dx code E11.9       Family History   Problem Relation Age of Onset   • Heart Attack Father    • bone cancer[other] [OTHER] Mother          Smoking Status: Current Every Day Smoker        Packs/Day: 0.50  Years: 39        Types: Cigarettes    Smokel range of motion of all 4 extremities. Distal pulses normal and symmetric  Skin: No masses or nodules or abnormalities.   Psych: Normal interaction, cooperative with exam      ED Course     Labs Reviewed   COMP METABOLIC PANEL (14) - Abnormal; Notable for t removed and he immediately felt great relief of his symptoms. MDM     Urinalysis does show findings consistent with UTI. Culture is pending.   I did discuss the patient with Dr. Nayeli Stone who is on-call for the patient's urologist, Dr. Oliva Alvarenga, and he recomm

## 2017-04-07 ENCOUNTER — PATIENT OUTREACH (OUTPATIENT)
Dept: CASE MANAGEMENT | Age: 72
End: 2017-04-07

## 2017-04-07 NOTE — PROGRESS NOTES
4/7/2017  Called to f/up with pt secondary to medical review where pt has had 3 ED visits starting 04/04/, 04/05, & 04/06. Pt reports \"I'm hanging in there but I have had 6 operations in 5 days and my body is exhausted\".  Pt seen in ED multiple times for

## 2017-04-08 RX ORDER — SULFAMETHOXAZOLE AND TRIMETHOPRIM 800; 160 MG/1; MG/1
1 TABLET ORAL 2 TIMES DAILY
Qty: 20 TABLET | Refills: 0 | Status: SHIPPED | OUTPATIENT
Start: 2017-04-08 | End: 2017-04-18

## 2017-04-10 ENCOUNTER — TELEPHONE (OUTPATIENT)
Dept: FAMILY MEDICINE CLINIC | Facility: CLINIC | Age: 72
End: 2017-04-10

## 2017-04-10 ENCOUNTER — OFFICE VISIT (OUTPATIENT)
Dept: FAMILY MEDICINE CLINIC | Facility: CLINIC | Age: 72
End: 2017-04-10

## 2017-04-10 VITALS
BODY MASS INDEX: 37.05 KG/M2 | HEIGHT: 75 IN | SYSTOLIC BLOOD PRESSURE: 138 MMHG | HEART RATE: 90 BPM | TEMPERATURE: 99 F | WEIGHT: 298 LBS | OXYGEN SATURATION: 99 % | DIASTOLIC BLOOD PRESSURE: 80 MMHG | RESPIRATION RATE: 18 BRPM

## 2017-04-10 DIAGNOSIS — F51.02 ADJUSTMENT INSOMNIA: ICD-10-CM

## 2017-04-10 DIAGNOSIS — C61 MALIGNANT NEOPLASM OF PROSTATE (HCC): ICD-10-CM

## 2017-04-10 DIAGNOSIS — R33.9 URINARY RETENTION: ICD-10-CM

## 2017-04-10 DIAGNOSIS — I50.810 RIGHT-SIDED HEART FAILURE (HCC): Primary | ICD-10-CM

## 2017-04-10 DIAGNOSIS — J43.1 PANLOBULAR EMPHYSEMA (HCC): ICD-10-CM

## 2017-04-10 PROCEDURE — 99214 OFFICE O/P EST MOD 30 MIN: CPT | Performed by: FAMILY MEDICINE

## 2017-04-10 RX ORDER — TEMAZEPAM 15 MG/1
15 CAPSULE ORAL NIGHTLY PRN
Qty: 30 CAPSULE | Refills: 0 | Status: SHIPPED | OUTPATIENT
Start: 2017-04-10 | End: 2017-05-08 | Stop reason: ALTCHOICE

## 2017-04-10 NOTE — PATIENT INSTRUCTIONS
Medicines for Chronic Obstructive Pulmonary Disease  Some medicines for COPD help control or prevent symptoms. They are called maintenance medicines. Take these medicines every day or as instructed by your healthcare provider. Some are rescue medicines. Inhaled corticosteroids. These medicines are taken with an inhaler or nebulizer. They are maintenance medicines.   My medicines: __________________________________________  When to take: __________________________________________     Oral corticosteroids. T It’s normal to feel sad or down at times when you’re living with heart failure. Some medicines can also affect your mood. Following your treatment plan may seem difficult at times. If you feel overwhelmed, just focus on one day at a time.  Don’t be afraid t · Social support helps alleviate stress and helps your stick with your healthy lifestyle changes.  Without social support, you may end up back in the hospital.  Date Last Reviewed: 3/20/2016  © [id number] [address] Kelli Anguiano · Hydralazine and nitrates are two separate medicines used together to treat heart failure. They may come in one “combination” pill. They lower blood pressure and decrease how hard the heart has to pump.   Medicines for related conditions  Controlling other · Also avoid alcohol for at least 4 to 6 hours before bedtime. It may help you fall asleep at first, but you will have more awakenings throughout the night, and your sleep will not be restful.   Before bedtime  To sleep better every night, try these tips:

## 2017-04-10 NOTE — PROGRESS NOTES
[facility] Family Medicine Office Note  Chief Complaint:   Patient presents with:  Hospital F/U: 4/4/17 urinary retention      HPI:   This is a 66year old male coming in for hospital follow-up from previous ventral hernia repair.   Patient states Myocardial infarct ([facility] [address] 9/07     w/3 vessel coronary artery disease, s/ stent x3 and RCA.    • Panic disorder      sees longtime psychiatrist   • Leukocytosis 11/09/2007   • Urinary retention 11/05/2007   • Dysuria 11/5/07     w/burning   • Obese    • Elev Surgical History    CARDIAC CATHERTERIZATION (PBP)  09/28/2007    Comment left heart cath,liclat selective coronary angiogram,left iliofemoral angiogram    HEMORRHOIDECTOMY  9/6/07    CATH BARE METAL STENT (BMS)      OTHER      Comment AAA repair    ANGIOG (0.1 mg total) by mouth 2 (two) times daily. Disp: 60 tablet Rfl: 6   Potassium Chloride ER (KLOR-CON M20) 20 MEQ Oral Tab CR Take 2 tablets daily Disp: 60 tablet Rfl: 3   spironolactone 50 MG Oral Tab Take 1 tablet (50 mg total) by mouth daily.  Disp: 30 t breath, + dry cough. GASTROINTESTINAL:  Denies any abdominal pain  NEUROLOGICAL:  Denies headache, dizziness, syncope  MUSCULOSKELETAL:  Denies muscle, back pain, joint pain or stiffness.   :  + urinary retention 2/2 enlarged prostate  PSYCHIATRIC:  Kristina Santos catheter can be removed  -  Continue avodart and rapaflo    5.   Insomnia  -  Likely from inability to get comfortable at night due to luther catheter  -  Stop clonazepam and start temazepam 15mg qhs  -  Highly encouraged patient not to take both clonazepam Benign essential HTN     Diabetes mellitus type 2 in obese (HCC)     CAD in native artery     Hyperlipidemia, unspecified     At risk for falling     Congestive heart failure (CHF) (HCC)     Essential hypertension     Cellulitis of lower extremity     Pros

## 2017-04-10 NOTE — TELEPHONE ENCOUNTER
Ruston called to let us know Khushbu Becerril missed appt today due to coming to see Dr. Gabrielle Drake. He did not want to make up appt so she will only see him once this week.

## 2017-04-11 ENCOUNTER — TELEPHONE (OUTPATIENT)
Dept: FAMILY MEDICINE CLINIC | Facility: CLINIC | Age: 72
End: 2017-04-11

## 2017-04-11 NOTE — TELEPHONE ENCOUNTER
Spoke with Dr Nano Basilio about pt refusing therapy this week. Dr Nano Basilio advised that it is okay to wait for a week to accommodate pt's schedule. Called to residential home health Haleigh Salgado, and advised him of dr oro.   Haleigh Salgado voiced understanding and agree

## 2017-04-11 NOTE — TELEPHONE ENCOUNTER
Received call from [facility]- Donna Hilliard. Donna Hilliard stated that pt is refusing PT this week due to pt having too many doctors appointments. Pt stated next week would work better.   Donna Hilliard stated that pt was hard to contact, and that he was rude and hu

## 2017-04-14 ENCOUNTER — TELEPHONE (OUTPATIENT)
Dept: CASE MANAGEMENT | Age: 72
End: 2017-04-14

## 2017-04-14 ENCOUNTER — PATIENT OUTREACH (OUTPATIENT)
Dept: CASE MANAGEMENT | Age: 72
End: 2017-04-14

## 2017-04-14 DIAGNOSIS — J42 CHRONIC BRONCHITIS, UNSPECIFIED CHRONIC BRONCHITIS TYPE (HCC): ICD-10-CM

## 2017-04-14 DIAGNOSIS — E11.9 TYPE 2 DIABETES MELLITUS WITHOUT COMPLICATION, UNSPECIFIED LONG TERM INSULIN USE STATUS: ICD-10-CM

## 2017-04-14 DIAGNOSIS — E78.00 HYPERCHOLESTEROLEMIA: ICD-10-CM

## 2017-04-14 DIAGNOSIS — N18.9 CKD (CHRONIC KIDNEY DISEASE), UNSPECIFIED STAGE: ICD-10-CM

## 2017-04-14 DIAGNOSIS — F32.A DEPRESSION, UNSPECIFIED DEPRESSION TYPE: ICD-10-CM

## 2017-04-14 DIAGNOSIS — Z91.81 AT RISK FOR FALLING: ICD-10-CM

## 2017-04-14 DIAGNOSIS — E66.01 MORBID OBESITY, UNSPECIFIED OBESITY TYPE (HCC): ICD-10-CM

## 2017-04-14 DIAGNOSIS — L03.119 CELLULITIS OF LOWER EXTREMITY, UNSPECIFIED LATERALITY: ICD-10-CM

## 2017-04-14 DIAGNOSIS — I50.9 ACUTE ON CHRONIC CONGESTIVE HEART FAILURE, UNSPECIFIED CONGESTIVE HEART FAILURE TYPE: ICD-10-CM

## 2017-04-14 DIAGNOSIS — I50.810 RIGHT HEART FAILURE (HCC): ICD-10-CM

## 2017-04-14 DIAGNOSIS — F41.0 PANIC DISORDER: ICD-10-CM

## 2017-04-14 DIAGNOSIS — E88.81 METABOLIC SYNDROME: ICD-10-CM

## 2017-04-14 DIAGNOSIS — N28.9 ACUTE RENAL INSUFFICIENCY: Primary | ICD-10-CM

## 2017-04-14 DIAGNOSIS — I10 ESSENTIAL HYPERTENSION: ICD-10-CM

## 2017-04-14 DIAGNOSIS — J43.1 PANLOBULAR EMPHYSEMA (HCC): ICD-10-CM

## 2017-04-14 DIAGNOSIS — I25.10 CORONARY ARTERY DISEASE INVOLVING NATIVE CORONARY ARTERY OF NATIVE HEART WITHOUT ANGINA PECTORIS: ICD-10-CM

## 2017-04-14 DIAGNOSIS — R33.9 URINARY RETENTION: ICD-10-CM

## 2017-04-14 DIAGNOSIS — I87.2 VENOUS INSUFFICIENCY: ICD-10-CM

## 2017-04-14 DIAGNOSIS — I10 BENIGN ESSENTIAL HTN: ICD-10-CM

## 2017-04-14 DIAGNOSIS — R73.9 HYPERGLYCEMIA: ICD-10-CM

## 2017-04-14 PROCEDURE — 99490 CHRNC CARE MGMT STAFF 1ST 20: CPT

## 2017-04-14 NOTE — PROGRESS NOTES
4/14/2017  Called to f/up as pt is post op ventral hernia repair. He reports \"still in the trenches\" and still has luther in place.  Has had a few voiding trials and failed; however, he has f/up on 04/17/17 for voiding trial. Noted in PCP chart notes from

## 2017-04-14 NOTE — TELEPHONE ENCOUNTER
Pt reports he has restarted all medications as of OV with PCP on 04/10/17. He also reports improved sleep since starting Temazepam 15 mg stating he is getting 4-5 hours sleep nightly. TRIAGE: Update only. No action needed at this time. Thank you.

## 2017-04-25 ENCOUNTER — TELEPHONE (OUTPATIENT)
Dept: FAMILY MEDICINE CLINIC | Facility: CLINIC | Age: 72
End: 2017-04-25

## 2017-04-25 NOTE — TELEPHONE ENCOUNTER
Call from [facility]/residential home health-requesting a 2-4 visit extension of PT visits for pt. Advised will forward to dr adler and call back with his input. Bismark French voices understanding/agrees with plan. Please advise-thanks!

## 2017-04-25 NOTE — TELEPHONE ENCOUNTER
Call back to [facility] home [facility]-advised dr adler is ok with PT visit extension as requested. Svitlana Hdz voices understanding/no further questions.

## 2017-04-28 PROCEDURE — 87086 URINE CULTURE/COLONY COUNT: CPT | Performed by: UROLOGY

## 2017-05-04 ENCOUNTER — TELEPHONE (OUTPATIENT)
Dept: CASE MANAGEMENT | Age: 72
End: 2017-05-04

## 2017-05-04 ENCOUNTER — PATIENT OUTREACH (OUTPATIENT)
Dept: CASE MANAGEMENT | Age: 72
End: 2017-05-04

## 2017-05-04 ENCOUNTER — TELEPHONE (OUTPATIENT)
Dept: FAMILY MEDICINE CLINIC | Facility: CLINIC | Age: 72
End: 2017-05-04

## 2017-05-04 DIAGNOSIS — I50.810 RIGHT HEART FAILURE (HCC): Primary | ICD-10-CM

## 2017-05-04 DIAGNOSIS — I25.10 CAD IN NATIVE ARTERY: ICD-10-CM

## 2017-05-04 DIAGNOSIS — N18.9 CKD (CHRONIC KIDNEY DISEASE), UNSPECIFIED STAGE: ICD-10-CM

## 2017-05-04 DIAGNOSIS — E11.22 TYPE 2 DIABETES MELLITUS WITH DIABETIC CHRONIC KIDNEY DISEASE, UNSPECIFIED CKD STAGE, UNSPECIFIED LONG TERM INSULIN USE STATUS: ICD-10-CM

## 2017-05-04 DIAGNOSIS — J43.1 PANLOBULAR EMPHYSEMA (HCC): ICD-10-CM

## 2017-05-04 DIAGNOSIS — E78.5 HYPERLIPIDEMIA, UNSPECIFIED: ICD-10-CM

## 2017-05-04 DIAGNOSIS — E03.9 ACQUIRED HYPOTHYROIDISM: ICD-10-CM

## 2017-05-04 NOTE — PROGRESS NOTES
5/4/2017  Called and spoke to pt to clarify a TE from pt on 05/01/17 to Dr. Jonnathan Salazar office. Pt states he confused Tamsulosin with Toresmide. He was able to confirm dose and strength of both medications.  Pt states he had luther removed and is voiding on h

## 2017-05-04 NOTE — TELEPHONE ENCOUNTER
Eduin Caceres calling stating patient's blood sugars are running 300's-400's each AM. Currently on Metformin 1,000 mg BID. I don't see A1c since December. All other things out of control.  Tests pended, Eduin Caceres asking if Metformin can be increased to 3,000 or whatever

## 2017-05-04 NOTE — TELEPHONE ENCOUNTER
I tried to get patient to see someone tomorrow. Patient refuses appointment tomorrow, will see Love on 5/10/17.

## 2017-05-04 NOTE — PROGRESS NOTES
5/4/2017  Called and spoke briefly to pt who states [facility] RN is currently with him and he would like a call back this afternoon. Acknowledged and confirmed call back today with pt.

## 2017-05-04 NOTE — TELEPHONE ENCOUNTER
We cannot increase metformin that would be over dosing of this medication. I would like patient to get his blood work done. We need to make sure that his kidneys are okay and livers are okay to handle  another medication.   I would like pt  to see Dr. Darryl Weller

## 2017-05-04 NOTE — TELEPHONE ENCOUNTER
Spoke to pt who reports he thinks he \"sprained back\" during PT as he was lifting 1lb weights. Pt states he has scheduled f/up with PCP on 05/10/17 and will address possible back sprain at that time.  Pt did not give any further details regarding pain or o

## 2017-05-04 NOTE — TELEPHONE ENCOUNTER
Labs already pended. Appointment is scheduled for Wed. that is best I could do with ride situation, son Rosey Ling is already aware to take to ER if sugar worsens. He said it's highly unlikely he will ever agree to go.   Kidney situation discussed in length with sonja

## 2017-05-08 ENCOUNTER — TELEPHONE (OUTPATIENT)
Dept: FAMILY MEDICINE CLINIC | Facility: CLINIC | Age: 72
End: 2017-05-08

## 2017-05-08 DIAGNOSIS — E03.9 HYPOTHYROIDISM, UNSPECIFIED TYPE: Primary | ICD-10-CM

## 2017-05-08 RX ORDER — LEVOTHYROXINE SODIUM 0.2 MG/1
TABLET ORAL
Qty: 90 TABLET | Refills: 0 | Status: CANCELLED | OUTPATIENT
Start: 2017-05-08

## 2017-05-08 RX ORDER — LEVOTHYROXINE SODIUM 0.2 MG/1
200 TABLET ORAL
Qty: 30 TABLET | Refills: 0 | Status: SHIPPED | OUTPATIENT
Start: 2017-05-08 | End: 2017-05-10

## 2017-05-08 NOTE — TELEPHONE ENCOUNTER
Called pt and advised of dr wick. Pt stated that he is coming in Wednesday and told the pharmacist to give him a few pills so that he can get to appointment.   I advised pt that if he does his labs on the day of his appointment Dr Sherri Macdonald would not be abl

## 2017-05-08 NOTE — TELEPHONE ENCOUNTER
Patient was also due for repeat TSH. His last TSH was abnormal and had to adjust his medication. Please have him get labs done and I will give him 30 day supply.

## 2017-05-08 NOTE — TELEPHONE ENCOUNTER
Savage. Yuvonne Manifold Yuvonne Manifold Yuvonne Manifold Yuvonne Manifold Yuvonne Manifold Kareem Arango, Physical Therapist /[facility] called our office to share pt called her today and pt told Kareem Arango not to come tomorrow (5/9) for PT; per pt, he is discharging himself from Physical Therapy.   Per Kareem Arango, pt was seen for kn

## 2017-05-08 NOTE — TELEPHONE ENCOUNTER
Received call from [address] at [place]. Pt is requesting a refill of his Levothyroxine 200 mcg ( take one capsule in a.m daily) pt stated that he was out of his medication.   After looking in pt's chart last refill was 1/4/17, asked [place] to ask

## 2017-05-09 ENCOUNTER — LAB ENCOUNTER (OUTPATIENT)
Dept: LAB | Age: 72
End: 2017-05-09
Attending: FAMILY MEDICINE
Payer: MEDICARE

## 2017-05-09 DIAGNOSIS — I25.10 CORONARY ARTERY DISEASE INVOLVING NATIVE CORONARY ARTERY OF NATIVE HEART WITHOUT ANGINA PECTORIS: ICD-10-CM

## 2017-05-09 DIAGNOSIS — E03.9 ACQUIRED HYPOTHYROIDISM: ICD-10-CM

## 2017-05-09 DIAGNOSIS — E11.22 TYPE 2 DIABETES MELLITUS WITH DIABETIC CHRONIC KIDNEY DISEASE, UNSPECIFIED CKD STAGE, UNSPECIFIED LONG TERM INSULIN USE STATUS: ICD-10-CM

## 2017-05-09 DIAGNOSIS — E03.9 HYPOTHYROIDISM, UNSPECIFIED TYPE: ICD-10-CM

## 2017-05-09 DIAGNOSIS — E78.5 HYPERLIPIDEMIA, UNSPECIFIED: ICD-10-CM

## 2017-05-09 DIAGNOSIS — I50.810 RIGHT HEART FAILURE (HCC): ICD-10-CM

## 2017-05-09 DIAGNOSIS — E78.00 HYPERCHOLESTEROLEMIA: ICD-10-CM

## 2017-05-09 DIAGNOSIS — C61 PROSTATE CANCER (HCC): ICD-10-CM

## 2017-05-09 DIAGNOSIS — I25.10 CAD IN NATIVE ARTERY: ICD-10-CM

## 2017-05-09 DIAGNOSIS — Z01.818 PRE-OP TESTING: ICD-10-CM

## 2017-05-09 DIAGNOSIS — R33.9 RETENTION OF URINE: ICD-10-CM

## 2017-05-09 DIAGNOSIS — J43.1 PANLOBULAR EMPHYSEMA (HCC): ICD-10-CM

## 2017-05-09 PROCEDURE — 85025 COMPLETE CBC W/AUTO DIFF WBC: CPT

## 2017-05-09 PROCEDURE — 80061 LIPID PANEL: CPT

## 2017-05-09 PROCEDURE — 84443 ASSAY THYROID STIM HORMONE: CPT

## 2017-05-09 PROCEDURE — 36415 COLL VENOUS BLD VENIPUNCTURE: CPT

## 2017-05-09 PROCEDURE — 84439 ASSAY OF FREE THYROXINE: CPT

## 2017-05-09 PROCEDURE — 83036 HEMOGLOBIN GLYCOSYLATED A1C: CPT

## 2017-05-09 PROCEDURE — 80053 COMPREHEN METABOLIC PANEL: CPT

## 2017-05-09 PROCEDURE — 84153 ASSAY OF PSA TOTAL: CPT

## 2017-05-10 ENCOUNTER — TELEPHONE (OUTPATIENT)
Dept: FAMILY MEDICINE CLINIC | Facility: CLINIC | Age: 72
End: 2017-05-10

## 2017-05-10 ENCOUNTER — OFFICE VISIT (OUTPATIENT)
Dept: FAMILY MEDICINE CLINIC | Facility: CLINIC | Age: 72
End: 2017-05-10

## 2017-05-10 VITALS
OXYGEN SATURATION: 97 % | TEMPERATURE: 99 F | SYSTOLIC BLOOD PRESSURE: 130 MMHG | WEIGHT: 300 LBS | DIASTOLIC BLOOD PRESSURE: 74 MMHG | BODY MASS INDEX: 37.3 KG/M2 | HEART RATE: 76 BPM | HEIGHT: 75 IN | RESPIRATION RATE: 18 BRPM

## 2017-05-10 DIAGNOSIS — J43.1 PANLOBULAR EMPHYSEMA (HCC): ICD-10-CM

## 2017-05-10 DIAGNOSIS — N18.30 CONTROLLED TYPE 2 DIABETES MELLITUS WITH STAGE 3 CHRONIC KIDNEY DISEASE, WITH LONG-TERM CURRENT USE OF INSULIN (HCC): Primary | ICD-10-CM

## 2017-05-10 DIAGNOSIS — E03.9 ACQUIRED HYPOTHYROIDISM: ICD-10-CM

## 2017-05-10 DIAGNOSIS — E11.22 CONTROLLED TYPE 2 DIABETES MELLITUS WITH STAGE 3 CHRONIC KIDNEY DISEASE, WITH LONG-TERM CURRENT USE OF INSULIN (HCC): Primary | ICD-10-CM

## 2017-05-10 DIAGNOSIS — I10 HYPERTENSION GOAL BP (BLOOD PRESSURE) < 130/80: ICD-10-CM

## 2017-05-10 DIAGNOSIS — Z79.4 CONTROLLED TYPE 2 DIABETES MELLITUS WITH STAGE 3 CHRONIC KIDNEY DISEASE, WITH LONG-TERM CURRENT USE OF INSULIN (HCC): Primary | ICD-10-CM

## 2017-05-10 PROCEDURE — 82570 ASSAY OF URINE CREATININE: CPT

## 2017-05-10 PROCEDURE — 99214 OFFICE O/P EST MOD 30 MIN: CPT | Performed by: FAMILY MEDICINE

## 2017-05-10 PROCEDURE — 82043 UR ALBUMIN QUANTITATIVE: CPT

## 2017-05-10 RX ORDER — LEVOTHYROXINE SODIUM 0.2 MG/1
200 TABLET ORAL
Qty: 90 TABLET | Refills: 0 | Status: SHIPPED | OUTPATIENT
Start: 2017-05-10 | End: 2017-08-16

## 2017-05-10 RX ORDER — BUDESONIDE AND FORMOTEROL FUMARATE DIHYDRATE 160; 4.5 UG/1; UG/1
2 AEROSOL RESPIRATORY (INHALATION) 2 TIMES DAILY
Qty: 4 INHALER | Refills: 0 | COMMUNITY
Start: 2017-05-10 | End: 2017-12-27

## 2017-05-10 RX ORDER — IPRATROPIUM BROMIDE 21 UG/1
SPRAY, METERED NASAL
Qty: 1 BOTTLE | Refills: 2 | Status: SHIPPED | OUTPATIENT
Start: 2017-05-10 | End: 2018-07-31 | Stop reason: ALTCHOICE

## 2017-05-10 RX ORDER — LEVOTHYROXINE SODIUM 0.03 MG/1
25 TABLET ORAL
Qty: 90 TABLET | Refills: 0 | Status: SHIPPED | OUTPATIENT
Start: 2017-05-10 | End: 2017-06-04

## 2017-05-10 NOTE — PATIENT INSTRUCTIONS
Medicines for Chronic Obstructive Pulmonary Disease  Some medicines for COPD help control or prevent symptoms. They are called maintenance medicines. Take these medicines every day or as instructed by your healthcare provider. Some are rescue medicines. Inhaled corticosteroids. These medicines are taken with an inhaler or nebulizer. They are maintenance medicines.   My medicines: __________________________________________  When to take: __________________________________________     Oral corticosteroids. T

## 2017-05-10 NOTE — PROGRESS NOTES
[facility] Family Medicine Office Note  Chief Complaint:   Patient presents with:  Diabetes: f/u DM      HPI:   This is a 66year old male coming in for follow up of DM2, HTN and hypothyroidism.     1.  HTN - Patient presents for recheck of his hy retention 11/05/2007   • Dysuria 11/5/07     w/burning   • Obese    • Elevated lipids    • Hypokalemia 9/2007   • S/P coronary artery stent placement 9/28/2007     patent right coronary artery stent, significant lesion of proximal left anterior descending CATH BARE METAL STENT (BMS)      OTHER      Comment AAA repair    ANGIOGRAM      THYROIDECTOMY      OTHER SURGICAL HISTORY  9/07    Comment patent right coronary artery stent    OTHER SURGICAL HISTORY      Comment rt cataract     Social History:    Smok Rfl: 0   Potassium Chloride ER (KLOR-CON M20) 20 MEQ Oral Tab CR Take 2 tablets daily Disp: 60 tablet Rfl: 3   Glucose Blood (FREESTYLE LITE TEST) In Vitro Strip Use one strip to test QID,per MD, DX CODE E11.9 Disp: 100 strip Rfl: 1   METFORMIN HCL 1000 MG of this encounter: 75\". Weight as of this encounter: 300 lb. Vital signs reviewed. Appears stated age, well groomed.   Physical Exam:  GEN:  Patient is alert and oriented x3, no apparent distress  HEAD:  Normocephalic, atraumatic  LUNGS: clear to auscu Sig: Take 1 tablet (200 mcg total) by mouth before breakfast.           Health Maintenance:  Diabetes Care Dilated Eye Exam due on 11/20/1945  COLONOSCOPY, 10 YEARS due on 11/20/1994  INFLUENZA VACCINE due on 09/01/2015    Patient/Caregiver Education: Shivani Oakley failure ([facility] [address]     Panlobular emphysema ([facility] [address]     Controlled type 2 diabetes mellitus with stage 3 chronic kidney disease, with long-term current use of insulin ([facility] [address]      TRISTAN POZO DO  11/20/2015  2:15 PM

## 2017-05-11 DIAGNOSIS — E11.22 TYPE 2 DIABETES MELLITUS WITH DIABETIC CHRONIC KIDNEY DISEASE, UNSPECIFIED CKD STAGE, UNSPECIFIED LONG TERM INSULIN USE STATUS: Primary | ICD-10-CM

## 2017-05-16 ENCOUNTER — APPOINTMENT (OUTPATIENT)
Dept: LAB | Facility: HOSPITAL | Age: 72
End: 2017-05-16
Attending: INTERNAL MEDICINE
Payer: MEDICARE

## 2017-05-16 DIAGNOSIS — Z99.89 OSA ON CPAP: ICD-10-CM

## 2017-05-16 DIAGNOSIS — G47.33 OSA ON CPAP: ICD-10-CM

## 2017-05-16 DIAGNOSIS — R60.9 PERIPHERAL EDEMA: ICD-10-CM

## 2017-05-16 DIAGNOSIS — R33.9 URINARY RETENTION: ICD-10-CM

## 2017-05-16 DIAGNOSIS — I50.810 RIGHT HEART FAILURE (HCC): ICD-10-CM

## 2017-05-16 PROCEDURE — 80048 BASIC METABOLIC PNL TOTAL CA: CPT

## 2017-05-16 PROCEDURE — 36415 COLL VENOUS BLD VENIPUNCTURE: CPT

## 2017-05-17 ENCOUNTER — TELEPHONE (OUTPATIENT)
Dept: FAMILY MEDICINE CLINIC | Facility: CLINIC | Age: 72
End: 2017-05-17

## 2017-05-22 ENCOUNTER — TELEPHONE (OUTPATIENT)
Dept: FAMILY MEDICINE CLINIC | Facility: CLINIC | Age: 72
End: 2017-05-22

## 2017-05-22 NOTE — TELEPHONE ENCOUNTER
Lm for Kerline Julian from [facility] to Cb to confirm she received a verbal order for continuation of Home health.

## 2017-05-22 NOTE — TELEPHONE ENCOUNTER
Nadine Lewis from [facility] is looking for approval to re-certify pt to have him continue Home Health. Pt needs more medication education, as he is making errors with his meds. Pt is still on Telehealth and they are looking to transition him.

## 2017-05-23 ENCOUNTER — PATIENT OUTREACH (OUTPATIENT)
Dept: CASE MANAGEMENT | Age: 72
End: 2017-05-23

## 2017-05-23 NOTE — PROGRESS NOTES
Called patient to introduce myself. Patient asked if I Could call him in a few weeks. Called and introduce myself. Todd Castillo

## 2017-06-05 RX ORDER — LEVOTHYROXINE SODIUM 0.03 MG/1
TABLET ORAL
Qty: 90 TABLET | Refills: 0 | Status: SHIPPED | OUTPATIENT
Start: 2017-06-05 | End: 2017-08-16

## 2017-06-05 NOTE — TELEPHONE ENCOUNTER
Please advise pt to get TSH/bloodwork repeat in three weeks. Was asked to repeat 6 weeks from 5-10-17. Thanks.

## 2017-06-13 ENCOUNTER — PATIENT OUTREACH (OUTPATIENT)
Dept: CASE MANAGEMENT | Age: 72
End: 2017-06-13

## 2017-06-15 ENCOUNTER — PATIENT OUTREACH (OUTPATIENT)
Dept: CASE MANAGEMENT | Age: 72
End: 2017-06-15

## 2017-06-27 NOTE — TELEPHONE ENCOUNTER
Last refill for this medication 10/16/2016. Outgoing call to patient to confirm he is currently taking. Patient states he is taking the medication, his wife is taking the same medication and she had an extra bottle, they are trying to save money.

## 2017-06-30 ENCOUNTER — TELEPHONE (OUTPATIENT)
Dept: FAMILY MEDICINE CLINIC | Facility: CLINIC | Age: 72
End: 2017-06-30

## 2017-07-24 ENCOUNTER — PATIENT OUTREACH (OUTPATIENT)
Dept: CASE MANAGEMENT | Age: 72
End: 2017-07-24

## 2017-07-24 NOTE — PROGRESS NOTES
Called patient and left a message for him to call back when he gets a chance.  Reviewed chart     Coordination of education, review of chart, update to record, sending materials:[facility]  Time: 6 min

## 2017-07-31 ENCOUNTER — TELEPHONE (OUTPATIENT)
Dept: FAMILY MEDICINE CLINIC | Facility: CLINIC | Age: 72
End: 2017-07-31

## 2017-07-31 NOTE — TELEPHONE ENCOUNTER
LM on unidentified VM. Sent unable to reach letter.   Needs Medicare Annual Welness visit 45 minutes

## 2017-08-03 ENCOUNTER — TELEPHONE (OUTPATIENT)
Dept: FAMILY MEDICINE CLINIC | Facility: CLINIC | Age: 72
End: 2017-08-03

## 2017-08-16 ENCOUNTER — PATIENT OUTREACH (OUTPATIENT)
Dept: CASE MANAGEMENT | Age: 72
End: 2017-08-16

## 2017-08-16 ENCOUNTER — TELEPHONE (OUTPATIENT)
Dept: FAMILY MEDICINE CLINIC | Facility: CLINIC | Age: 72
End: 2017-08-16

## 2017-08-16 DIAGNOSIS — F32.A DEPRESSION, UNSPECIFIED DEPRESSION TYPE: ICD-10-CM

## 2017-08-16 DIAGNOSIS — E78.5 HYPERLIPIDEMIA, UNSPECIFIED: ICD-10-CM

## 2017-08-16 DIAGNOSIS — N18.30 STAGE 3 CHRONIC KIDNEY DISEASE (HCC): ICD-10-CM

## 2017-08-16 DIAGNOSIS — M17.11 OSTEOARTHRITIS OF RIGHT KNEE, UNSPECIFIED OSTEOARTHRITIS TYPE: ICD-10-CM

## 2017-08-16 DIAGNOSIS — C61 PROSTATE CANCER (HCC): ICD-10-CM

## 2017-08-16 DIAGNOSIS — I25.10 CORONARY ARTERY DISEASE INVOLVING NATIVE CORONARY ARTERY OF NATIVE HEART WITHOUT ANGINA PECTORIS: ICD-10-CM

## 2017-08-16 DIAGNOSIS — I10 ESSENTIAL HYPERTENSION: ICD-10-CM

## 2017-08-16 DIAGNOSIS — I10 BENIGN ESSENTIAL HTN: ICD-10-CM

## 2017-08-16 DIAGNOSIS — I50.9 ACUTE ON CHRONIC CONGESTIVE HEART FAILURE, UNSPECIFIED CONGESTIVE HEART FAILURE TYPE: ICD-10-CM

## 2017-08-16 DIAGNOSIS — I50.810 RIGHT HEART FAILURE (HCC): ICD-10-CM

## 2017-08-16 DIAGNOSIS — J43.1 PANLOBULAR EMPHYSEMA (HCC): ICD-10-CM

## 2017-08-16 DIAGNOSIS — E11.22 TYPE 2 DIABETES MELLITUS WITH DIABETIC CHRONIC KIDNEY DISEASE, UNSPECIFIED CKD STAGE, UNSPECIFIED LONG TERM INSULIN USE STATUS: ICD-10-CM

## 2017-08-16 PROCEDURE — 99490 CHRNC CARE MGMT STAFF 1ST 20: CPT

## 2017-08-16 RX ORDER — LEVOTHYROXINE SODIUM 0.2 MG/1
200 TABLET ORAL
Qty: 90 TABLET | Refills: 0 | Status: SHIPPED | OUTPATIENT
Start: 2017-08-16 | End: 2017-12-03

## 2017-08-16 RX ORDER — LEVOTHYROXINE SODIUM 0.03 MG/1
TABLET ORAL
Qty: 90 TABLET | Refills: 0 | Status: SHIPPED | OUTPATIENT
Start: 2017-08-16 | End: 2017-12-27

## 2017-08-16 RX ORDER — GABAPENTIN 300 MG/1
300 CAPSULE ORAL 3 TIMES DAILY
Qty: 90 CAPSULE | Refills: 0 | Status: SHIPPED | OUTPATIENT
Start: 2017-08-16 | End: 2017-11-09

## 2017-08-16 NOTE — PROGRESS NOTES
8/16/2017  Spoke to Frankie at length about CCM, current care plan and performed CCM assessment with Frankie reviewed meds and compliance.  Reviewed pt Patient Active Problem List:     Malignant neoplasm of prostate [facility])     Essential hypertension, benign told him he is due for lab work and her related to me that he does not have any transportation. I gave him the information for [facility] and saadia Musa. Patient relates he has no money for a cab.      Diabetes: Patient relates numbers have been today: 20    Physical assessment, complete health history, and need for CCM established by Shavon Walton DO.

## 2017-08-16 NOTE — TELEPHONE ENCOUNTER
When going through patient perscription medication he related he needed a refill on gabapentin 300mg, levothyroxine 200mg and levothyroxine 25 mg.  He would like it sent to [facility]     FYI we went ovre that patient is due for lab work and he related he has

## 2017-09-27 ENCOUNTER — PATIENT OUTREACH (OUTPATIENT)
Dept: CASE MANAGEMENT | Age: 72
End: 2017-09-27

## 2017-09-27 DIAGNOSIS — N17.9 AKI (ACUTE KIDNEY INJURY) (HCC): ICD-10-CM

## 2017-09-27 DIAGNOSIS — N18.30 CONTROLLED TYPE 2 DIABETES MELLITUS WITH STAGE 3 CHRONIC KIDNEY DISEASE, WITH LONG-TERM CURRENT USE OF INSULIN (HCC): ICD-10-CM

## 2017-09-27 DIAGNOSIS — E11.22 CONTROLLED TYPE 2 DIABETES MELLITUS WITH STAGE 3 CHRONIC KIDNEY DISEASE, WITH LONG-TERM CURRENT USE OF INSULIN (HCC): ICD-10-CM

## 2017-09-27 DIAGNOSIS — J43.1 PANLOBULAR EMPHYSEMA (HCC): ICD-10-CM

## 2017-09-27 DIAGNOSIS — N28.9 ACUTE RENAL INSUFFICIENCY: ICD-10-CM

## 2017-09-27 DIAGNOSIS — I25.10 CORONARY ARTERY DISEASE INVOLVING NATIVE CORONARY ARTERY OF NATIVE HEART WITHOUT ANGINA PECTORIS: ICD-10-CM

## 2017-09-27 DIAGNOSIS — Z79.4 CONTROLLED TYPE 2 DIABETES MELLITUS WITH STAGE 3 CHRONIC KIDNEY DISEASE, WITH LONG-TERM CURRENT USE OF INSULIN (HCC): ICD-10-CM

## 2017-09-27 DIAGNOSIS — I10 BENIGN ESSENTIAL HTN: ICD-10-CM

## 2017-09-27 DIAGNOSIS — F32.A DEPRESSION, UNSPECIFIED DEPRESSION TYPE: ICD-10-CM

## 2017-09-27 PROCEDURE — 99490 CHRNC CARE MGMT STAFF 1ST 20: CPT

## 2017-09-27 NOTE — PROGRESS NOTES
Reviewed patient chart. Called patient to remind him he has an appointment on 9/20/2017. Also left a message to make sure he has transportation to the appointment.      Time spent with patient 4 mins

## 2017-09-27 NOTE — PROGRESS NOTES
9/27/2017  Spoke to Frankie at length about CCM, current care plan and performed CCM assessment with Frankie reviewed meds and compliance. Reviewed pt CAD, Diabetes and water retention.      Health Maintenance:   Diabetic eye exam- transportation is a probl [id number] [phone]          Goal: work on diet exercises. Work on water retention.    Care Plan: Reviewed and updated where needed  Sending education on: Coordination of education, review of chart, update to record, sending materials:[facility] Safety  Ti

## 2017-09-29 ENCOUNTER — TELEPHONE (OUTPATIENT)
Dept: FAMILY MEDICINE CLINIC | Facility: CLINIC | Age: 72
End: 2017-09-29

## 2017-09-29 NOTE — TELEPHONE ENCOUNTER
Appears info noted below from spouse/dawn is additional info for dr adler, although pt's appt tomorrow is for medicare annual wellness visit. Updated dr adler with call info.  He requests we have  call pt or dawn/spouse-advise to keep appt as rochelle

## 2017-09-29 NOTE — TELEPHONE ENCOUNTER
Pt's wife/Flavia called our office Thursday afternoon (9/28) and left a [facility] w/Medical Records for a return call. Per wife/Flavia, pt has an appt on Saturday, 9/30 w/Dr. Edenilson Garrison.   Per wife/Flavia, she would also like for Dr. Edenilson Garrison to check pt's legs and stomach,

## 2017-09-30 ENCOUNTER — OFFICE VISIT (OUTPATIENT)
Dept: FAMILY MEDICINE CLINIC | Facility: CLINIC | Age: 72
End: 2017-09-30

## 2017-09-30 VITALS
RESPIRATION RATE: 14 BRPM | SYSTOLIC BLOOD PRESSURE: 110 MMHG | DIASTOLIC BLOOD PRESSURE: 60 MMHG | HEIGHT: 75 IN | TEMPERATURE: 97 F | HEART RATE: 88 BPM | OXYGEN SATURATION: 97 % | BODY MASS INDEX: 39.17 KG/M2 | WEIGHT: 315 LBS

## 2017-09-30 DIAGNOSIS — E11.22 CONTROLLED TYPE 2 DIABETES MELLITUS WITH STAGE 3 CHRONIC KIDNEY DISEASE, WITH LONG-TERM CURRENT USE OF INSULIN (HCC): ICD-10-CM

## 2017-09-30 DIAGNOSIS — E66.01 MORBID OBESITY (HCC): ICD-10-CM

## 2017-09-30 DIAGNOSIS — E78.00 HYPERCHOLESTEROLEMIA: ICD-10-CM

## 2017-09-30 DIAGNOSIS — E03.9 ACQUIRED HYPOTHYROIDISM: ICD-10-CM

## 2017-09-30 DIAGNOSIS — Z00.00 ROUTINE GENERAL MEDICAL EXAMINATION AT A HEALTH CARE FACILITY: ICD-10-CM

## 2017-09-30 DIAGNOSIS — F17.200 NICOTINE USE DISORDER: ICD-10-CM

## 2017-09-30 DIAGNOSIS — Z00.00 ENCOUNTER FOR MEDICARE ANNUAL WELLNESS EXAM: Primary | ICD-10-CM

## 2017-09-30 DIAGNOSIS — I50.9 ACUTE ON CHRONIC CONGESTIVE HEART FAILURE, UNSPECIFIED CONGESTIVE HEART FAILURE TYPE: ICD-10-CM

## 2017-09-30 DIAGNOSIS — M79.605 CHRONIC PAIN OF BOTH LOWER EXTREMITIES: ICD-10-CM

## 2017-09-30 DIAGNOSIS — I10 HYPERTENSION GOAL BP (BLOOD PRESSURE) < 130/80: ICD-10-CM

## 2017-09-30 DIAGNOSIS — G89.29 CHRONIC PAIN OF BOTH LOWER EXTREMITIES: ICD-10-CM

## 2017-09-30 DIAGNOSIS — Z79.4 CONTROLLED TYPE 2 DIABETES MELLITUS WITH STAGE 3 CHRONIC KIDNEY DISEASE, WITH LONG-TERM CURRENT USE OF INSULIN (HCC): ICD-10-CM

## 2017-09-30 DIAGNOSIS — N18.30 CONTROLLED TYPE 2 DIABETES MELLITUS WITH STAGE 3 CHRONIC KIDNEY DISEASE, WITH LONG-TERM CURRENT USE OF INSULIN (HCC): ICD-10-CM

## 2017-09-30 DIAGNOSIS — M79.604 CHRONIC PAIN OF BOTH LOWER EXTREMITIES: ICD-10-CM

## 2017-09-30 PROCEDURE — 99406 BEHAV CHNG SMOKING 3-10 MIN: CPT | Performed by: FAMILY MEDICINE

## 2017-09-30 PROCEDURE — G0439 PPPS, SUBSEQ VISIT: HCPCS | Performed by: FAMILY MEDICINE

## 2017-09-30 RX ORDER — HYDROCODONE BITARTRATE AND ACETAMINOPHEN 7.5; 325 MG/1; MG/1
1 TABLET ORAL EVERY 6 HOURS PRN
Qty: 60 TABLET | Refills: 0 | Status: SHIPPED | OUTPATIENT
Start: 2017-09-30 | End: 2017-12-27

## 2017-10-01 NOTE — PROGRESS NOTES
HPI:   Bridger Caldwell is a 66year old male who presents for a Medicare Subsequent Annual Wellness visit (Pt already had Initial Annual Wellness). Patient has no complaints or concerns today.   His wife is concerned for early memory issues but patien retention     JAMES (acute kidney injury) ([facility] [address]     CKD (chronic kidney disease)     Right heart failure (HCC)     Panlobular emphysema (HCC)     Controlled type 2 diabetes mellitus with stage 3 chronic kidney disease, with long-term current use of insulin ( Oral Tab Take 1 tablet (325 mg total) by mouth daily. Metoprolol Succinate ER 50 MG Oral Tablet 24 Hr Take 1 tablet (50 mg total) by mouth daily. simvastatin 40 MG Oral Tab Take 1 tablet (40 mg total) by mouth nightly.    losartan 100 MG Oral Tab Take 1 Urinary retention (11/05/2007); UTI (urinary tract infection) (3/1/05); and Visual impairment.     He  has a past surgical history that includes cardiac catherterization (pbp) (09/28/2007); hemorrhoidectomy (9/6/07); cath bare metal stent (bms); other; pamela distress, appears stated age, + morbidly obese   Head:  Normocephalic, without obvious abnormality, atraumatic   Eyes:  PERRL, conjunctiva/corneas clear, EOM's intact, both eyes   Ears:  Normal TM's and external ear canals, both ears   Nose: Nares normal, DIFFERENTIAL WITH PLATELET;  Future  -  Check A1c and CMP  -  Continue metformin  -  Maintain A1c at 7  -  Encourage low carb intake and weight loss    Acquired hypothyroidism  -     TSH+FREE T4; Future  -   Recheck TSH and free T4  -  Adjust synthroid acco these issues and agrees to the plan. No Follow-up on file.      [facility], , 10/1/2017       General Health     In the past six months, have you lost more than 10 pounds without trying?: 2 - No    Has your appetite been poor?: No    How does t 0-No    Fall/Risk Scorin    Scoring Interpretation: 4+ At Risk     Depression Screening (PHQ-2/PHQ-9): Over the LAST 2 WEEKS   Little interest or pleasure in doing things (over the last two weeks)?: More than half the days    Feeling down, depressed, o for: FOB No flowsheet data found. Glaucoma Screening      Ophthalmology Visit Annually: Diabetics, FHx Glaucoma, AA>50, > 65 No flowsheet data found.     Prostate Cancer Screening      PSA  Annually PSA due on 05/09/2018  [facility]

## 2017-10-01 NOTE — PROGRESS NOTES
HPI:   Milo Oropeza is a 66year old male who presents for a Medicare Annual Wellness visit. Patient is here for Medicare annual wellness along with checkup of his COPD exacerbation.   He is currently taking Levaquin 500 mrem daily as well as predn cancer ([address]     Hyperglycemia     Type 2 diabetes mellitus with hyperglycemia, without long-term current use of insulin (HCC)     Hypothyroidism     Morbid obesity ([address]     Venous insufficiency     Peripheral edema     Acute renal insufficiency     Ventr Take 300 mg by mouth nightly.  )   Potassium Chloride ER (KLOR-CON M20) 20 MEQ Oral Tab CR Take 2 tablets daily   FINASTERIDE 5 MG Oral Tab TAKE ONE TABLET BY MOUTH ONCE DAILY   Ipratropium Bromide 0.03 % Nasal Solution 1-2 sprays each nostril every 12 félix Hyperlipidemia; Hypokalemia (9/2007); Leukocytosis (11/09/2007); Malignant neoplasm of prostate ([facility] (3/26/2012); Metabolic syndrome; Myocardial infarct ([facility] (9/07); Obese; BRITNEY on CPAP; Panic disorder; S/P coronary artery stent placement (9/28/2007);  Tob as calculated from the following:    Height as of this encounter: 75\". Weight as of this encounter: 315 lb.     Medicare Hearing Assessment  (Required for AWV/SWV)    Questionnaire     Visual Acuity                /60 (BP Location: Left arm, Patie for a Medicare Assessment.      PLAN SUMMARY:   Diagnoses and all orders for this visit:    Routine general medical examination at a health care facility  -  PATIENT REFUSES COLONOSCOPY  -  PATIENT REFUSES PNEUMONIA VACCINE  -  PATIENT REFUSES PROSTATE EXAM afford your medications?: Yes    Hearing Problems?: Yes     Functional Status     Hearing Problems?: Yes    Vision Problems? :  (eye exam ?? )    Difficulty walking?: Yes (uses walker)    Difficulty dressing or bathing?: Yes    Problems with daily activitie 04/15/2014 7.2 (H)     HgbA1C (%)   Date Value   05/09/2017 6.9 (H)       No flowsheet data found.     Fasting Blood Sugar (FSB)Annually   Glucose (mg/dL)   Date Value   05/16/2017 158 (H)   ----------  GLUCOSE (mg/dL)   Date Value   04/15/2014 141 (H) CREATININE (mg/dL)   Date Value   04/15/2014 0.74     Creatinine (mg/dL)   Date Value   05/16/2017 1.95 (H)    No flowsheet data found. Drug Serum Conc  Annually No results found for: DIGOXIN, DIG, VALP No flowsheet data found.     Diabetes      HgbA1C

## 2017-10-01 NOTE — PATIENT INSTRUCTIONS
Frankie Madden's SCREENING SCHEDULE   Tests on this list are recommended by your physician but may not be covered, or covered at this frequency, by your insurer. Please check with your insurance carrier before scheduling to verify coverage.     Radha Cobb this or any previous visit.  Limited to patients who meet one of the following criteria:   • Men who are 73-68 years old and have smoked more than 100 cigarettes in their lifetime   • Anyone with a family history    Colorectal Cancer Screening Covered up to house as a HepB virus carrier   Homosexual men   Illicit injectable drug abusers     Tetanus Toxoid- Only covered with a cut with metal- TD and TDaP Not covered by Medicare Part B) No orders found for this or any previous visit.  This may be covered with yo Flexible sigmoidoscopy every 5 years, or colonoscopy every 10 years, or double-contrast barium enema every 5 years; yearly fecal occult blood test or fecal immunochemical test; or a stool DNA test as often as your healthcare provider advises; talk with you apart   Hepatitis B Men at increased risk for infection – talk with your healthcare provider 3 doses over 6 months; second dose should be given 1 month after the first dose; the third dose should be given at least 2 months after the second dose and at leas

## 2017-10-03 ENCOUNTER — PATIENT OUTREACH (OUTPATIENT)
Dept: CASE MANAGEMENT | Age: 72
End: 2017-10-03

## 2017-10-03 NOTE — PROGRESS NOTES
Coordination of education, review of chart, update to pt record, compilation and mailing resources/materials: Flu education, Why get the flu vaccine, and request to get flu vaccine with PCP and or [facility] locations.   Time: 5 min

## 2017-10-09 ENCOUNTER — TELEPHONE (OUTPATIENT)
Dept: FAMILY MEDICINE CLINIC | Facility: CLINIC | Age: 72
End: 2017-10-09

## 2017-10-09 RX ORDER — CLONAZEPAM 1 MG/1
1 TABLET ORAL 2 TIMES DAILY PRN
Qty: 60 TABLET | Refills: 2 | Status: SHIPPED | OUTPATIENT
Start: 2017-10-09 | End: 2017-12-27

## 2017-10-09 NOTE — TELEPHONE ENCOUNTER
Pt called our office to inquire about a refill on his Clonazepam 0.5 tablets (2 tablets during the day; 2 tablets at bedtime).   Per pt, this medication is prescribed by Psychiatrity-Dr. Jamey Martin, but per pt he does not have a car, nor transportation mean

## 2017-11-09 RX ORDER — GABAPENTIN 300 MG/1
CAPSULE ORAL
Qty: 90 CAPSULE | Refills: 0 | Status: SHIPPED | OUTPATIENT
Start: 2017-11-09 | End: 2017-12-21

## 2017-11-09 NOTE — TELEPHONE ENCOUNTER
Not protocol medication. LOV :9/30/17 medicare annual   Last labs done :5/09/17  Next appointment : not yet made. Please see pending medication. Refill if appropriate.    Last refill:    Date: 8/16/17  Amount :90 tablets no refill   Medication: gabapent

## 2017-11-28 ENCOUNTER — PATIENT OUTREACH (OUTPATIENT)
Dept: CASE MANAGEMENT | Age: 72
End: 2017-11-28

## 2017-12-03 RX ORDER — LEVOTHYROXINE SODIUM 0.2 MG/1
TABLET ORAL
Qty: 90 TABLET | Refills: 0 | Status: SHIPPED | OUTPATIENT
Start: 2017-12-03 | End: 2017-12-27

## 2017-12-20 ENCOUNTER — PATIENT OUTREACH (OUTPATIENT)
Dept: CASE MANAGEMENT | Age: 72
End: 2017-12-20

## 2017-12-20 NOTE — PROGRESS NOTES
Reviewed patient chart. Patient was instructed to go to the ER it does not look like he went. Tried to call patient to get an update and see how he is doing.      Time spent 5 mins

## 2017-12-21 ENCOUNTER — TELEPHONE (OUTPATIENT)
Dept: FAMILY MEDICINE CLINIC | Facility: CLINIC | Age: 72
End: 2017-12-21

## 2017-12-21 RX ORDER — GABAPENTIN 300 MG/1
CAPSULE ORAL
Qty: 90 CAPSULE | Refills: 0 | Status: SHIPPED | OUTPATIENT
Start: 2017-12-21 | End: 2018-01-19

## 2017-12-21 NOTE — TELEPHONE ENCOUNTER
Per wife, they had to change insurance and now has to go to [facility] which is now in network for pt. This msg printed and given to Tang Barrientos.

## 2017-12-27 ENCOUNTER — OFFICE VISIT (OUTPATIENT)
Dept: FAMILY MEDICINE CLINIC | Facility: CLINIC | Age: 72
End: 2017-12-27

## 2017-12-27 ENCOUNTER — TELEPHONE (OUTPATIENT)
Dept: FAMILY MEDICINE CLINIC | Facility: CLINIC | Age: 72
End: 2017-12-27

## 2017-12-27 ENCOUNTER — HOSPITAL ENCOUNTER (OUTPATIENT)
Dept: GENERAL RADIOLOGY | Age: 72
Discharge: HOME | End: 2017-12-27
Attending: FAMILY MEDICINE
Payer: MEDICARE

## 2017-12-27 ENCOUNTER — LAB ENCOUNTER (OUTPATIENT)
Dept: LAB | Age: 72
End: 2017-12-27
Attending: FAMILY MEDICINE
Payer: MEDICARE

## 2017-12-27 VITALS
RESPIRATION RATE: 20 BRPM | DIASTOLIC BLOOD PRESSURE: 74 MMHG | TEMPERATURE: 97 F | BODY MASS INDEX: 39.17 KG/M2 | HEART RATE: 80 BPM | SYSTOLIC BLOOD PRESSURE: 120 MMHG | OXYGEN SATURATION: 94 % | WEIGHT: 315 LBS | HEIGHT: 75 IN

## 2017-12-27 DIAGNOSIS — I50.810 RIGHT-SIDED HEART FAILURE (HCC): ICD-10-CM

## 2017-12-27 DIAGNOSIS — R06.02 SHORTNESS OF BREATH: ICD-10-CM

## 2017-12-27 DIAGNOSIS — E03.9 ACQUIRED HYPOTHYROIDISM: ICD-10-CM

## 2017-12-27 DIAGNOSIS — E78.00 HYPERCHOLESTEROLEMIA: ICD-10-CM

## 2017-12-27 DIAGNOSIS — M79.605 CHRONIC PAIN OF BOTH LOWER EXTREMITIES: ICD-10-CM

## 2017-12-27 DIAGNOSIS — I50.9 ACUTE ON CHRONIC CONGESTIVE HEART FAILURE, UNSPECIFIED CONGESTIVE HEART FAILURE TYPE: ICD-10-CM

## 2017-12-27 DIAGNOSIS — G89.29 CHRONIC PAIN OF BOTH LOWER EXTREMITIES: ICD-10-CM

## 2017-12-27 DIAGNOSIS — Z79.4 CONTROLLED TYPE 2 DIABETES MELLITUS WITH STAGE 3 CHRONIC KIDNEY DISEASE, WITH LONG-TERM CURRENT USE OF INSULIN (HCC): ICD-10-CM

## 2017-12-27 DIAGNOSIS — N18.30 CONTROLLED TYPE 2 DIABETES MELLITUS WITH STAGE 3 CHRONIC KIDNEY DISEASE, WITH LONG-TERM CURRENT USE OF INSULIN (HCC): ICD-10-CM

## 2017-12-27 DIAGNOSIS — I10 HYPERTENSION GOAL BP (BLOOD PRESSURE) < 130/80: ICD-10-CM

## 2017-12-27 DIAGNOSIS — E11.22 CONTROLLED TYPE 2 DIABETES MELLITUS WITH STAGE 3 CHRONIC KIDNEY DISEASE, WITH LONG-TERM CURRENT USE OF INSULIN (HCC): ICD-10-CM

## 2017-12-27 DIAGNOSIS — J43.1 PANLOBULAR EMPHYSEMA (HCC): ICD-10-CM

## 2017-12-27 DIAGNOSIS — M79.604 CHRONIC PAIN OF BOTH LOWER EXTREMITIES: ICD-10-CM

## 2017-12-27 DIAGNOSIS — R80.9 CONTROLLED TYPE 2 DIABETES MELLITUS WITH MICROALBUMINURIA, WITHOUT LONG-TERM CURRENT USE OF INSULIN (HCC): Primary | ICD-10-CM

## 2017-12-27 DIAGNOSIS — E11.29 CONTROLLED TYPE 2 DIABETES MELLITUS WITH MICROALBUMINURIA, WITHOUT LONG-TERM CURRENT USE OF INSULIN (HCC): Primary | ICD-10-CM

## 2017-12-27 PROCEDURE — 36415 COLL VENOUS BLD VENIPUNCTURE: CPT

## 2017-12-27 PROCEDURE — 71020 XR CHEST PA + LAT CHEST (CPT=71020): CPT | Performed by: FAMILY MEDICINE

## 2017-12-27 PROCEDURE — 80053 COMPREHEN METABOLIC PANEL: CPT

## 2017-12-27 PROCEDURE — 83880 ASSAY OF NATRIURETIC PEPTIDE: CPT

## 2017-12-27 PROCEDURE — 85025 COMPLETE CBC W/AUTO DIFF WBC: CPT

## 2017-12-27 PROCEDURE — 83721 ASSAY OF BLOOD LIPOPROTEIN: CPT

## 2017-12-27 PROCEDURE — 83036 HEMOGLOBIN GLYCOSYLATED A1C: CPT

## 2017-12-27 PROCEDURE — 84443 ASSAY THYROID STIM HORMONE: CPT

## 2017-12-27 PROCEDURE — 84439 ASSAY OF FREE THYROXINE: CPT

## 2017-12-27 PROCEDURE — 99215 OFFICE O/P EST HI 40 MIN: CPT | Performed by: FAMILY MEDICINE

## 2017-12-27 PROCEDURE — 80061 LIPID PANEL: CPT

## 2017-12-27 RX ORDER — CLONAZEPAM 1 MG/1
1 TABLET ORAL 2 TIMES DAILY PRN
Qty: 60 TABLET | Refills: 2 | Status: SHIPPED | OUTPATIENT
Start: 2017-12-27 | End: 2018-03-27

## 2017-12-27 RX ORDER — AZITHROMYCIN 250 MG/1
TABLET, FILM COATED ORAL
Qty: 6 TABLET | Refills: 0 | Status: SHIPPED | OUTPATIENT
Start: 2017-12-27 | End: 2018-05-09 | Stop reason: ALTCHOICE

## 2017-12-27 RX ORDER — HYDROCODONE BITARTRATE AND ACETAMINOPHEN 7.5; 325 MG/1; MG/1
1 TABLET ORAL EVERY 6 HOURS PRN
Qty: 60 TABLET | Refills: 0 | Status: SHIPPED | OUTPATIENT
Start: 2017-12-27 | End: 2018-07-25

## 2017-12-27 RX ORDER — LEVOTHYROXINE SODIUM 0.03 MG/1
TABLET ORAL
Qty: 90 TABLET | Refills: 0 | Status: SHIPPED | OUTPATIENT
Start: 2017-12-27 | End: 2018-03-24

## 2017-12-27 RX ORDER — FINASTERIDE 5 MG/1
TABLET, FILM COATED ORAL
Qty: 30 TABLET | Refills: 11 | Status: SHIPPED | OUTPATIENT
Start: 2017-12-27 | End: 2019-03-01

## 2017-12-27 RX ORDER — BUDESONIDE AND FORMOTEROL FUMARATE DIHYDRATE 160; 4.5 UG/1; UG/1
2 AEROSOL RESPIRATORY (INHALATION) 2 TIMES DAILY
Qty: 4 INHALER | Refills: 0 | Status: SHIPPED | OUTPATIENT
Start: 2017-12-27 | End: 2018-08-14

## 2017-12-27 RX ORDER — LEVOTHYROXINE SODIUM 0.2 MG/1
TABLET ORAL
Qty: 90 TABLET | Refills: 0 | Status: SHIPPED | OUTPATIENT
Start: 2017-12-27 | End: 2018-03-27

## 2017-12-27 NOTE — PROGRESS NOTES
[facility] Family Medicine Office Note  Chief Complaint:   Patient presents with:  Swelling: pamela foot swelling      HPI:   This is a 67year old male coming in for follow up of DM2, right sided heart failure, COPD, hypothyroidism and SOB.     1. graft   • Acute sinusitis 1/4/12   • Aneurysm ([address]     large,infrarenal   • Anxiety state    • Atelectasis 11/6/07    on chest x-ray minimal right perihilar    • Atherosclerosis of coronary artery    • Atherosclerotic heart disease 9/27/2007    bifemoral type diabetes mellitus without mention of complication, not stated as uncontrolled 10/26/2010   • Unspecified sleep apnea DMG SPLIT 12-13-12    AHI 46 RDI 65 SaO2 87% CPAP 10 [employer]   • Urinary retention 11/05/2007   • UTI (urinary tract infection) 3/1/05 Rfl: 2   gabapentin 300 MG Oral Cap TAKE ONE CAPSULE BY MOUTH THREE TIMES DAILY Disp: 90 capsule Rfl: 0   losartan 100 MG Oral Tab Take 1 tablet (100 mg total) by mouth daily. Disp: 90 tablet Rfl: 1   torsemide 20 MG Oral Tab Take 3 tablets daily. total 60 of breath, + chronic cough  GASTROINTESTINAL:  Denies any abdominal pain, nausea, vomiting  NEUROLOGICAL:  Denies headache, dizziness, syncope  MUSCULOSKELETAL:  + chronic back and leg pain    EXAM:   /74 (BP Location: Left arm, Patient Position: Sit Oral Tab; Take 1 tablet by mouth every 6 (six) hours as needed for Pain. Dispense: 60 tablet; Refill: 0    3.  Right-sided heart failure  -  Worsening  -  Recheck ECHO  -  Check CXR given crackles on exam  -  Check BNP  -  Will adjust torsemide and metopro MCG/ACT Inhalation Aerosol 4 Inhaler 0      Sig: Inhale 2 puffs into the lungs 2 (two) times daily. ClonazePAM 1 MG Oral Tab 60 tablet 2      Sig: Take 1 tablet (1 mg total) by mouth 2 (two) times daily as needed for Anxiety.            [id number] [facility] use of insulin (HCC)     Hypothyroidism     Morbid obesity ([facility] [address]     Venous insufficiency     Peripheral edema     Acute renal insufficiency     Ventral hernia without obstruction or gangrene     Urinary retention     JAMES (acute kidney injury) ([address]     CK

## 2017-12-27 NOTE — TELEPHONE ENCOUNTER
Given his history of COPD, he is likely having a COPD exacerbation or CHF exacerbation. His labs are still pending in regards to observation but will send in a Z-Sorin in the meantime to see if that helps.

## 2017-12-27 NOTE — TELEPHONE ENCOUNTER
Pt states he was in today to see Dr. Jennifer Mcnamara and pt went to  his scripts at the pharmacy but there was no antibiotic. Pt states he has a very sore throat and cough and is asking about an antibiotic.  Pt confirmed pharmacy as [facility] [id number] -

## 2017-12-28 DIAGNOSIS — E03.9 HYPOTHYROIDISM, UNSPECIFIED TYPE: Primary | ICD-10-CM

## 2017-12-28 DIAGNOSIS — R94.6 ABNORMAL THYROID FUNCTION TEST: ICD-10-CM

## 2017-12-28 DIAGNOSIS — E03.9 ACQUIRED HYPOTHYROIDISM: Primary | ICD-10-CM

## 2017-12-28 DIAGNOSIS — I50.810 RIGHT HEART FAILURE (HCC): ICD-10-CM

## 2017-12-28 DIAGNOSIS — R33.9 URINARY RETENTION: ICD-10-CM

## 2017-12-28 DIAGNOSIS — N18.30 STAGE 3 CHRONIC KIDNEY DISEASE (HCC): ICD-10-CM

## 2017-12-28 DIAGNOSIS — E11.22 TYPE 2 DIABETES MELLITUS WITH DIABETIC CHRONIC KIDNEY DISEASE, UNSPECIFIED CKD STAGE, UNSPECIFIED LONG TERM INSULIN USE STATUS: ICD-10-CM

## 2018-01-16 ENCOUNTER — TELEPHONE (OUTPATIENT)
Dept: FAMILY MEDICINE CLINIC | Facility: CLINIC | Age: 73
End: 2018-01-16

## 2018-01-16 NOTE — TELEPHONE ENCOUNTER
Both scripts were done December 27, 2017. Please check with pharmacy to make sure it has not been filled. If not, please send me another request and I will refill it again.

## 2018-01-16 NOTE — TELEPHONE ENCOUNTER
Patient relates he never received the script for hydrocodone-acetaminophen and clonazepam please advise.

## 2018-01-16 NOTE — TELEPHONE ENCOUNTER
Call to [facility]-spoke with pat/pharmacist-confirms they received both the hydrocodone and clonazepam prescriptions from 12/27/17.  sts it is too soon for pt to fill clonazepam. Advised will let pt know pharmacy has both prescriptions-any other que

## 2018-01-19 RX ORDER — GABAPENTIN 300 MG/1
CAPSULE ORAL
Qty: 90 CAPSULE | Refills: 0 | Status: SHIPPED | OUTPATIENT
Start: 2018-01-19 | End: 2018-02-15

## 2018-02-15 RX ORDER — GABAPENTIN 300 MG/1
CAPSULE ORAL
Qty: 90 CAPSULE | Refills: 0 | Status: SHIPPED | OUTPATIENT
Start: 2018-02-15 | End: 2018-03-14

## 2018-03-14 RX ORDER — GABAPENTIN 300 MG/1
CAPSULE ORAL
Qty: 90 CAPSULE | Refills: 0 | Status: SHIPPED | OUTPATIENT
Start: 2018-03-14 | End: 2018-04-06

## 2018-03-24 DIAGNOSIS — E03.9 ACQUIRED HYPOTHYROIDISM: ICD-10-CM

## 2018-03-26 RX ORDER — LEVOTHYROXINE SODIUM 0.03 MG/1
TABLET ORAL
Qty: 90 TABLET | Refills: 0 | Status: SHIPPED | OUTPATIENT
Start: 2018-03-26 | End: 2018-03-27 | Stop reason: DRUGHIGH

## 2018-03-27 ENCOUNTER — APPOINTMENT (OUTPATIENT)
Dept: LAB | Age: 73
End: 2018-03-27
Attending: FAMILY MEDICINE
Payer: MEDICARE

## 2018-03-27 DIAGNOSIS — E03.9 ACQUIRED HYPOTHYROIDISM: ICD-10-CM

## 2018-03-27 DIAGNOSIS — Z79.899 ENCOUNTER FOR MONITORING DIURETIC THERAPY: ICD-10-CM

## 2018-03-27 DIAGNOSIS — E03.9 HYPOTHYROIDISM, UNSPECIFIED TYPE: ICD-10-CM

## 2018-03-27 DIAGNOSIS — R79.89 ELEVATED SERUM CREATININE: Primary | ICD-10-CM

## 2018-03-27 DIAGNOSIS — I50.810 RIGHT HEART FAILURE (HCC): ICD-10-CM

## 2018-03-27 DIAGNOSIS — N18.30 STAGE 3 CHRONIC KIDNEY DISEASE (HCC): ICD-10-CM

## 2018-03-27 DIAGNOSIS — Z79.899 ENCOUNTER FOR LONG-TERM (CURRENT) DRUG USE: ICD-10-CM

## 2018-03-27 DIAGNOSIS — R33.9 URINARY RETENTION: ICD-10-CM

## 2018-03-27 DIAGNOSIS — Z51.81 ENCOUNTER FOR MONITORING DIURETIC THERAPY: ICD-10-CM

## 2018-03-27 DIAGNOSIS — E11.22 TYPE 2 DIABETES MELLITUS WITH DIABETIC CHRONIC KIDNEY DISEASE (HCC): ICD-10-CM

## 2018-03-27 LAB
ALBUMIN SERPL-MCNC: 3.8 G/DL (ref 3.5–4.8)
ALP LIVER SERPL-CCNC: 103 U/L (ref 45–117)
ALT SERPL-CCNC: 21 U/L (ref 17–63)
AST SERPL-CCNC: 19 U/L (ref 15–41)
BILIRUB SERPL-MCNC: 0.4 MG/DL (ref 0.1–2)
BUN BLD-MCNC: 37 MG/DL (ref 8–20)
CALCIUM BLD-MCNC: 8.6 MG/DL (ref 8.3–10.3)
CHLORIDE: 102 MMOL/L (ref 101–111)
CHOLEST SMN-MCNC: 135 MG/DL (ref ?–200)
CO2: 27 MMOL/L (ref 22–32)
CREAT BLD-MCNC: 2.46 MG/DL (ref 0.7–1.3)
EST. AVERAGE GLUCOSE BLD GHB EST-MCNC: 194 MG/DL (ref 68–126)
FREE T4: 0.6 NG/DL (ref 0.9–1.8)
GLUCOSE BLD-MCNC: 165 MG/DL (ref 70–99)
HBA1C MFR BLD HPLC: 8.4 % (ref ?–5.7)
HDLC SERPL-MCNC: 32 MG/DL (ref 45–?)
HDLC SERPL: 4.22 {RATIO} (ref ?–4.97)
LDLC SERPL CALC-MCNC: 42 MG/DL (ref ?–130)
M PROTEIN MFR SERPL ELPH: 7.2 G/DL (ref 6.1–8.3)
NONHDLC SERPL-MCNC: 103 MG/DL (ref ?–130)
POTASSIUM SERPL-SCNC: 4 MMOL/L (ref 3.6–5.1)
SODIUM SERPL-SCNC: 138 MMOL/L (ref 136–144)
TRIGL SERPL-MCNC: 304 MG/DL (ref ?–150)
TSI SER-ACNC: 81.4 MIU/ML (ref 0.35–5.5)
VLDLC SERPL CALC-MCNC: 61 MG/DL (ref 5–40)

## 2018-03-27 PROCEDURE — 84439 ASSAY OF FREE THYROXINE: CPT

## 2018-03-27 PROCEDURE — 84443 ASSAY THYROID STIM HORMONE: CPT

## 2018-03-27 PROCEDURE — 80053 COMPREHEN METABOLIC PANEL: CPT

## 2018-03-27 PROCEDURE — 83036 HEMOGLOBIN GLYCOSYLATED A1C: CPT

## 2018-03-27 PROCEDURE — 36415 COLL VENOUS BLD VENIPUNCTURE: CPT

## 2018-03-27 PROCEDURE — 80061 LIPID PANEL: CPT

## 2018-03-27 RX ORDER — CLONAZEPAM 1 MG/1
1 TABLET ORAL 2 TIMES DAILY PRN
Qty: 60 TABLET | Refills: 2 | Status: SHIPPED | OUTPATIENT
Start: 2018-03-27 | End: 2018-06-13

## 2018-03-27 RX ORDER — LEVOTHYROXINE SODIUM 0.05 MG/1
TABLET ORAL
Qty: 90 TABLET | Refills: 0 | Status: SHIPPED | OUTPATIENT
Start: 2018-03-27 | End: 2018-06-25

## 2018-03-27 RX ORDER — LEVOTHYROXINE SODIUM 0.2 MG/1
TABLET ORAL
Qty: 90 TABLET | Refills: 0 | Status: SHIPPED | OUTPATIENT
Start: 2018-03-27 | End: 2018-09-10

## 2018-03-27 NOTE — TELEPHONE ENCOUNTER
Call from pt-requesting clonazepam refill. Last med visit 12/27/17-advised follow up in 3-6 months depending on labs. No appts scheduled.   Med last ordered 12/27/17 #60 RFx2  **see med RF order pended for review=PLEASE CONFIRM DESIRED REFILL QUANTITY** th

## 2018-04-06 RX ORDER — GABAPENTIN 300 MG/1
CAPSULE ORAL
Qty: 90 CAPSULE | Refills: 1 | Status: SHIPPED | OUTPATIENT
Start: 2018-04-06 | End: 2018-06-08

## 2018-05-08 ENCOUNTER — TELEPHONE (OUTPATIENT)
Dept: FAMILY MEDICINE CLINIC | Facility: CLINIC | Age: 73
End: 2018-05-08

## 2018-05-08 NOTE — TELEPHONE ENCOUNTER
Call from reginald/son-listed on hipaa consent-sts \"my dad has appt w dr adler tomorrow. He won't let me come in to appt with him. Just wanted  to know a few things so he can be firm with him at appt tomorrow.    He recently saw cardiologist who said his l

## 2018-05-09 ENCOUNTER — OFFICE VISIT (OUTPATIENT)
Dept: FAMILY MEDICINE CLINIC | Facility: CLINIC | Age: 73
End: 2018-05-09

## 2018-05-09 VITALS
DIASTOLIC BLOOD PRESSURE: 80 MMHG | TEMPERATURE: 98 F | RESPIRATION RATE: 14 BRPM | WEIGHT: 315 LBS | HEART RATE: 74 BPM | BODY MASS INDEX: 40 KG/M2 | SYSTOLIC BLOOD PRESSURE: 140 MMHG

## 2018-05-09 DIAGNOSIS — E11.22 UNCONTROLLED TYPE 2 DIABETES MELLITUS WITH STAGE 4 CHRONIC KIDNEY DISEASE, WITHOUT LONG-TERM CURRENT USE OF INSULIN (HCC): Primary | ICD-10-CM

## 2018-05-09 DIAGNOSIS — E11.65 UNCONTROLLED TYPE 2 DIABETES MELLITUS WITH STAGE 4 CHRONIC KIDNEY DISEASE, WITHOUT LONG-TERM CURRENT USE OF INSULIN (HCC): Primary | ICD-10-CM

## 2018-05-09 DIAGNOSIS — E03.9 ACQUIRED HYPOTHYROIDISM: ICD-10-CM

## 2018-05-09 DIAGNOSIS — I50.812 CHRONIC RIGHT-SIDED HEART FAILURE (HCC): ICD-10-CM

## 2018-05-09 DIAGNOSIS — J43.1 PANLOBULAR EMPHYSEMA (HCC): ICD-10-CM

## 2018-05-09 DIAGNOSIS — H61.23 EXCESSIVE CERUMEN IN EAR CANAL, BILATERAL: ICD-10-CM

## 2018-05-09 DIAGNOSIS — F41.0 PANIC DISORDER: ICD-10-CM

## 2018-05-09 DIAGNOSIS — N18.4 UNCONTROLLED TYPE 2 DIABETES MELLITUS WITH STAGE 4 CHRONIC KIDNEY DISEASE, WITHOUT LONG-TERM CURRENT USE OF INSULIN (HCC): Primary | ICD-10-CM

## 2018-05-09 DIAGNOSIS — E66.01 MORBID OBESITY (HCC): ICD-10-CM

## 2018-05-09 DIAGNOSIS — I10 ESSENTIAL HYPERTENSION WITH GOAL BLOOD PRESSURE LESS THAN 140/90: ICD-10-CM

## 2018-05-09 PROBLEM — IMO0002 UNCONTROLLED TYPE 2 DIABETES MELLITUS WITH STAGE 4 CHRONIC KIDNEY DISEASE, WITHOUT LONG-TERM CURRENT USE OF INSULIN: Status: ACTIVE | Noted: 2017-05-10

## 2018-05-09 PROCEDURE — 99215 OFFICE O/P EST HI 40 MIN: CPT | Performed by: FAMILY MEDICINE

## 2018-05-09 PROCEDURE — 69210 REMOVE IMPACTED EAR WAX UNI: CPT | Performed by: FAMILY MEDICINE

## 2018-05-09 RX ORDER — CLONAZEPAM 0.5 MG/1
TABLET ORAL
Qty: 30 TABLET | Refills: 0 | Status: SHIPPED | OUTPATIENT
Start: 2018-05-09 | End: 2018-05-30

## 2018-05-09 NOTE — PROGRESS NOTES
[facility] Family Medicine Office Note  Chief Complaint:   Patient presents with:  Diabetes: f./u DM      HPI:   This is a 67year old male coming in for DM2, HTN, COPD, CHF, panic disorder, hypothyroidism and bilateral earwax buildup.     1.  DM2 medication well. Last TSH was recently and was very high as patient is not taking his medication for the past week. Restarted synthroid 250mcg daily about a month ago but stopped it last week again as he wanted to take a break from his meds.   Denies any s • Leukocytosis 11/09/2007   • Malignant neoplasm of prostate ([facility] [address] 3/26/2012    pt does not want radiation   • Metabolic syndrome    • Myocardial infarct ([facility] [address] 9/07    w/3 vessel coronary artery disease, s/ stent x3 and RCA.    • Obese    • BRITNEY on CPAP PRN anxiety Disp: 30 tablet Rfl: 0   METFORMIN HCL 1000 MG Oral Tab TAKE ONE TABLET BY MOUTH TWICE DAILY WITH MEALS Disp: 180 tablet Rfl: 0   simvastatin 40 MG Oral Tab Take 1 tablet (40 mg total) by mouth nightly.  Disp: 90 tablet Rfl: 0   GABAPENTIN 300 M HFA) 108 (90 BASE) MCG/ACT Inhalation Aero Soln Inhale 2 puffs into the lungs every 6 (six) hours as needed for Wheezing. Disp: 1 Inhaler Rfl: 3   FREESTYLE LANCETS Does not apply Misc Pt. Tests four times daily .  Dx code E11.9 Disp: 2 Box Rfl: 0      Read or gallops, + crackles bilateral bases and diminished breath sounds  ABDOMEN:  Soft, nondistended, nontender, bowel sounds normal in all 4 quadrants, no hepatosplenomegaly  EXTREMITIES:  Strength intact with 5/5 bilaterally upper and lower extremities, + 2 evening  -  Add klonopin 0.5mg in the afternoon PRN  -  f/u if symptoms not improving  - ClonazePAM 0.5 MG Oral Tab; 1 tab PO each afternoon PRN anxiety  Dispense: 30 tablet; Refill: 0    6.  Acquired hypothyroidism  -  Uncontrolled  -  Patient stopped taki Hemorrhoids     Hypercholesterolemia     CAD (coronary artery disease)     Metabolic syndrome     Tobacco abuse     Gallstone     Tracheal deviation     Echocardiogram abnormal     Osteoarthritis of right knee     COPD (chronic obstructive pulmonary diseas

## 2018-05-09 NOTE — PROCEDURES
After obtaining verbal consent, both ears were irrigated with water and curette was used to remove excess cerumen bilaterally. Patient had subjective relief and tolerated procedure well. No complications noted.   He was able to ambulate on his own upon le

## 2018-05-10 ENCOUNTER — TELEPHONE (OUTPATIENT)
Dept: FAMILY MEDICINE CLINIC | Facility: CLINIC | Age: 73
End: 2018-05-10

## 2018-05-10 NOTE — TELEPHONE ENCOUNTER
Pt son Сергей Burrell calling. He states per Alisson Macdonald was supposed to call him after appt yesterday to give update. Please advise. Thank you.

## 2018-05-14 ENCOUNTER — PATIENT OUTREACH (OUTPATIENT)
Dept: FAMILY MEDICINE CLINIC | Facility: CLINIC | Age: 73
End: 2018-05-14

## 2018-05-14 NOTE — PROGRESS NOTES
Please call pt to inquire if pt has had a colonoscopy in the last 10 years and if so who performed it (name and phone #). Please let Abel Dawkins know so I can obtain the report.     If pt did not have a colonoscopy please advise them we will leave the FIT stoo

## 2018-05-29 ENCOUNTER — TELEPHONE (OUTPATIENT)
Dept: NEPHROLOGY | Facility: CLINIC | Age: 73
End: 2018-05-29

## 2018-05-30 ENCOUNTER — TELEPHONE (OUTPATIENT)
Dept: FAMILY MEDICINE CLINIC | Facility: CLINIC | Age: 73
End: 2018-05-30

## 2018-05-30 DIAGNOSIS — F41.0 PANIC DISORDER: ICD-10-CM

## 2018-05-30 RX ORDER — CLONAZEPAM 0.5 MG/1
TABLET ORAL
Qty: 30 TABLET | Refills: 0 | Status: SHIPPED | OUTPATIENT
Start: 2018-05-30 | End: 2018-07-31 | Stop reason: DRUGHIGH

## 2018-05-30 NOTE — TELEPHONE ENCOUNTER
Patient states he was taking his medication last night by the sink and they fell down the drain.   Patient is asking for a refill of ClonazePAM 0.5 MG Oral Tab sent to [place] [id number] [address] [place]

## 2018-06-01 ENCOUNTER — APPOINTMENT (OUTPATIENT)
Dept: GENERAL RADIOLOGY | Facility: HOSPITAL | Age: 73
DRG: 246 | End: 2018-06-01
Attending: EMERGENCY MEDICINE
Payer: MEDICARE

## 2018-06-01 ENCOUNTER — HOSPITAL ENCOUNTER (INPATIENT)
Facility: HOSPITAL | Age: 73
LOS: 5 days | Discharge: HOME HEALTH | DRG: 246 | End: 2018-06-06
Attending: EMERGENCY MEDICINE | Admitting: STUDENT IN AN ORGANIZED HEALTH CARE EDUCATION/TRAINING PROGRAM
Payer: MEDICARE

## 2018-06-01 DIAGNOSIS — E78.00 HYPERCHOLESTEROLEMIA: ICD-10-CM

## 2018-06-01 DIAGNOSIS — R07.89 CHEST PAIN, ATYPICAL: Primary | ICD-10-CM

## 2018-06-01 DIAGNOSIS — I25.10 CORONARY ARTERY DISEASE INVOLVING NATIVE CORONARY ARTERY OF NATIVE HEART WITHOUT ANGINA PECTORIS: ICD-10-CM

## 2018-06-01 DIAGNOSIS — R77.8 ELEVATED TROPONIN: ICD-10-CM

## 2018-06-01 DIAGNOSIS — E11.22 TYPE 2 DIABETES MELLITUS WITH DIABETIC CHRONIC KIDNEY DISEASE, UNSPECIFIED CKD STAGE, UNSPECIFIED WHETHER LONG TERM INSULIN USE (HCC): ICD-10-CM

## 2018-06-01 PROBLEM — R79.89 ELEVATED TROPONIN: Status: ACTIVE | Noted: 2018-06-01

## 2018-06-01 PROCEDURE — 71045 X-RAY EXAM CHEST 1 VIEW: CPT | Performed by: EMERGENCY MEDICINE

## 2018-06-01 PROCEDURE — 99223 1ST HOSP IP/OBS HIGH 75: CPT | Performed by: STUDENT IN AN ORGANIZED HEALTH CARE EDUCATION/TRAINING PROGRAM

## 2018-06-01 RX ORDER — ONDANSETRON 2 MG/ML
4 INJECTION INTRAMUSCULAR; INTRAVENOUS EVERY 6 HOURS PRN
Status: DISCONTINUED | OUTPATIENT
Start: 2018-06-01 | End: 2018-06-06

## 2018-06-01 RX ORDER — LOSARTAN POTASSIUM 100 MG/1
100 TABLET ORAL DAILY
Status: DISCONTINUED | OUTPATIENT
Start: 2018-06-02 | End: 2018-06-06

## 2018-06-01 RX ORDER — ACETAMINOPHEN 325 MG/1
650 TABLET ORAL EVERY 6 HOURS PRN
Status: DISCONTINUED | OUTPATIENT
Start: 2018-06-01 | End: 2018-06-06

## 2018-06-01 RX ORDER — METOCLOPRAMIDE HYDROCHLORIDE 5 MG/ML
10 INJECTION INTRAMUSCULAR; INTRAVENOUS EVERY 8 HOURS PRN
Status: DISCONTINUED | OUTPATIENT
Start: 2018-06-01 | End: 2018-06-06

## 2018-06-01 RX ORDER — ALFUZOSIN HYDROCHLORIDE 10 MG/1
10 TABLET, EXTENDED RELEASE ORAL
Status: DISCONTINUED | OUTPATIENT
Start: 2018-06-02 | End: 2018-06-06

## 2018-06-01 RX ORDER — FUROSEMIDE 10 MG/ML
20 INJECTION INTRAMUSCULAR; INTRAVENOUS ONCE
Status: COMPLETED | OUTPATIENT
Start: 2018-06-01 | End: 2018-06-01

## 2018-06-01 RX ORDER — ATORVASTATIN CALCIUM 20 MG/1
20 TABLET, FILM COATED ORAL NIGHTLY
Status: DISCONTINUED | OUTPATIENT
Start: 2018-06-01 | End: 2018-06-06

## 2018-06-01 RX ORDER — GABAPENTIN 300 MG/1
300 CAPSULE ORAL 3 TIMES DAILY
Status: DISCONTINUED | OUTPATIENT
Start: 2018-06-01 | End: 2018-06-06

## 2018-06-01 RX ORDER — LEVOTHYROXINE SODIUM 0.12 MG/1
250 TABLET ORAL
Status: DISCONTINUED | OUTPATIENT
Start: 2018-06-02 | End: 2018-06-06

## 2018-06-01 RX ORDER — CLONAZEPAM 0.5 MG/1
0.5 TABLET ORAL DAILY PRN
Status: DISCONTINUED | OUTPATIENT
Start: 2018-06-01 | End: 2018-06-06

## 2018-06-01 RX ORDER — SODIUM CHLORIDE 9 MG/ML
INJECTION, SOLUTION INTRAVENOUS CONTINUOUS
Status: ACTIVE | OUTPATIENT
Start: 2018-06-01 | End: 2018-06-01

## 2018-06-01 RX ORDER — IPRATROPIUM BROMIDE 21 UG/1
1 SPRAY, METERED NASAL 2 TIMES DAILY
Status: DISCONTINUED | OUTPATIENT
Start: 2018-06-01 | End: 2018-06-06

## 2018-06-01 RX ORDER — HYDROCODONE BITARTRATE AND ACETAMINOPHEN 10; 325 MG/1; MG/1
1 TABLET ORAL EVERY 6 HOURS PRN
Status: DISCONTINUED | OUTPATIENT
Start: 2018-06-01 | End: 2018-06-06

## 2018-06-01 RX ORDER — CLONAZEPAM 0.5 MG/1
1 TABLET ORAL 2 TIMES DAILY PRN
Status: DISCONTINUED | OUTPATIENT
Start: 2018-06-01 | End: 2018-06-06

## 2018-06-01 RX ORDER — ONDANSETRON 2 MG/ML
4 INJECTION INTRAMUSCULAR; INTRAVENOUS EVERY 4 HOURS PRN
Status: DISCONTINUED | OUTPATIENT
Start: 2018-06-01 | End: 2018-06-06

## 2018-06-01 RX ORDER — ASPIRIN 81 MG/1
324 TABLET, CHEWABLE ORAL ONCE
Status: DISCONTINUED | OUTPATIENT
Start: 2018-06-01 | End: 2018-06-03

## 2018-06-01 RX ORDER — DEXTROSE MONOHYDRATE 25 G/50ML
50 INJECTION, SOLUTION INTRAVENOUS
Status: DISCONTINUED | OUTPATIENT
Start: 2018-06-01 | End: 2018-06-06

## 2018-06-01 RX ORDER — ALBUTEROL SULFATE 90 UG/1
2 AEROSOL, METERED RESPIRATORY (INHALATION) EVERY 6 HOURS PRN
Status: DISCONTINUED | OUTPATIENT
Start: 2018-06-01 | End: 2018-06-06

## 2018-06-01 RX ORDER — ASPIRIN 325 MG
325 TABLET ORAL DAILY
Status: DISCONTINUED | OUTPATIENT
Start: 2018-06-01 | End: 2018-06-06

## 2018-06-01 RX ORDER — METOPROLOL SUCCINATE 50 MG/1
50 TABLET, EXTENDED RELEASE ORAL
Status: DISCONTINUED | OUTPATIENT
Start: 2018-06-02 | End: 2018-06-06

## 2018-06-01 RX ORDER — FINASTERIDE 5 MG/1
5 TABLET, FILM COATED ORAL DAILY
Status: DISCONTINUED | OUTPATIENT
Start: 2018-06-02 | End: 2018-06-06

## 2018-06-01 RX ORDER — HEPARIN SODIUM 5000 [USP'U]/ML
5000 INJECTION, SOLUTION INTRAVENOUS; SUBCUTANEOUS EVERY 12 HOURS SCHEDULED
Status: DISCONTINUED | OUTPATIENT
Start: 2018-06-01 | End: 2018-06-02

## 2018-06-01 RX ORDER — NITROGLYCERIN 0.4 MG/1
TABLET SUBLINGUAL
Status: COMPLETED
Start: 2018-06-01 | End: 2018-06-01

## 2018-06-01 NOTE — ED PROVIDER NOTES
Patient Seen in: [facility] Emergency Department    History   Patient presents with:  Chest Pain Angina (cardiovascular)    Stated Complaint: chest pain    HPI    Patient is a 69-year-old male who presents emergency room with history of epigastric a Echocardiogram abnormal 9/7/07    technically difficult study d/t anatomic reasons, mild inferior basilar hypokinesia, mild concentric left ventricular hypertrophy, decreased left ventricular compliance   • Elevated lipids    • Elevated PSA    • Enlarged t Comment: rt cataract  No date: THYROIDECTOMY        Smoking status: Current Every Day Smoker                                                   Packs/day: 0.50      Years: 45.00        Types: Cigarettes  Smokeless tobacco: Never Used                      Co gross motor or sensory deficits appreciated. Cranial nerves II through XII are intact. Patient is answering all questions appropriately.           ED Course     Labs Reviewed   COMP METABOLIC PANEL (14) - Abnormal; Notable for the following:        Result 6/1/2018  CONCLUSION:  Exam is within normal limits. Dictated by: David Miranda DO on 6/01/2018 at 19:35     Approved by:  David Miranda DO                Patient an IV line established and blood work drawn including CBC, chemistries, BUN creatinine, an Noted POA    Chest pain, atypical R07.89 6/1/2018 Unknown

## 2018-06-01 NOTE — ED INITIAL ASSESSMENT (HPI)
Complaint of epigastric pain x 1 week. He states it occurs 30 minutes after eating and taking all his medications; states pain remains constant throughout the day until he takes all his \"nighttime\" medications. Describes pain as \"burning and sharp. \"

## 2018-06-01 NOTE — ED NOTES
Patient states he wants to urinate. This RN offered him a urinal, but patient said he wants to walk and use the restroom. This RN explained to the patient that due to his SOB and chest pain it may be better to not ambulate at this time. Patient refused.  So

## 2018-06-02 ENCOUNTER — APPOINTMENT (OUTPATIENT)
Dept: CV DIAGNOSTICS | Facility: HOSPITAL | Age: 73
DRG: 246 | End: 2018-06-02
Attending: INTERNAL MEDICINE
Payer: MEDICARE

## 2018-06-02 PROCEDURE — 99232 SBSQ HOSP IP/OBS MODERATE 35: CPT | Performed by: HOSPITALIST

## 2018-06-02 PROCEDURE — 5A09357 ASSISTANCE WITH RESPIRATORY VENTILATION, LESS THAN 24 CONSECUTIVE HOURS, CONTINUOUS POSITIVE AIRWAY PRESSURE: ICD-10-PCS | Performed by: INTERNAL MEDICINE

## 2018-06-02 PROCEDURE — 93306 TTE W/DOPPLER COMPLETE: CPT | Performed by: INTERNAL MEDICINE

## 2018-06-02 RX ORDER — HEPARIN SODIUM AND DEXTROSE 10000; 5 [USP'U]/100ML; G/100ML
INJECTION INTRAVENOUS CONTINUOUS
Status: DISCONTINUED | OUTPATIENT
Start: 2018-06-02 | End: 2018-06-04

## 2018-06-02 RX ORDER — HEPARIN SODIUM 5000 [USP'U]/ML
5000 INJECTION INTRAVENOUS; SUBCUTANEOUS ONCE
Status: COMPLETED | OUTPATIENT
Start: 2018-06-02 | End: 2018-06-02

## 2018-06-02 RX ORDER — HEPARIN SODIUM AND DEXTROSE 10000; 5 [USP'U]/100ML; G/100ML
1000 INJECTION INTRAVENOUS ONCE
Status: COMPLETED | OUTPATIENT
Start: 2018-06-02 | End: 2018-06-04

## 2018-06-02 RX ORDER — NITROGLYCERIN 0.6 MG/1
0.6 TABLET SUBLINGUAL EVERY 5 MIN PRN
Status: DISCONTINUED | OUTPATIENT
Start: 2018-06-02 | End: 2018-06-06

## 2018-06-02 RX ORDER — FUROSEMIDE 10 MG/ML
40 INJECTION INTRAMUSCULAR; INTRAVENOUS ONCE
Status: COMPLETED | OUTPATIENT
Start: 2018-06-02 | End: 2018-06-02

## 2018-06-02 NOTE — PLAN OF CARE
Problem: CARDIOVASCULAR - ADULT  Goal: Maintains optimal cardiac output and hemodynamic stability  INTERVENTIONS:  - Monitor vital signs, rhythm, and trends  - Monitor for bleeding, hypotension and signs of decreased cardiac output  - Evaluate effectivenes and hypoglycemia  - Administer ordered medications to maintain glucose within target range  - Assess barriers to adequate nutritional intake and initiate nutrition consult as needed  - Instruct patient on self management of diabetes  Outcome: Progressing

## 2018-06-02 NOTE — CM/SW NOTE
06/02/18 1153   CM/SW Screening   Referral Source Physician   Information Source Chart review   Patient's Mental Status Alert;Oriented   Patient's [address]   Patient lives with Spouse   Patient Status Prior to Admission   Independent with

## 2018-06-02 NOTE — SIGNIFICANT EVENT
Patient was complaining of chest pain while in the washroom. He rated it as 7/10. Oxygen per nasal cannula was given. One nitro sublingual was given. Chest pain decreased to 1/10. EKG was taken. Continue to monitor.

## 2018-06-02 NOTE — H&P
[facility]  History and Physical     Liana Gee Patient Status:  Emergency    1945 MRN [id number]   Location [address] [place] [address] Attending Jesse Sarabia MD   Hosp Day # 0 [facility],      Chief Com on ct small stone   • Heart attack [facility])    • Hemorrhoids    • High blood pressure    • High cholesterol    • History of noncompliance with medical treatment 9/07   • Hypercholesterolemia    • Hyperlipidemia    • Hypokalemia 9/2007   • Leukocytosis 11/09/2 bone cancer [OTHER] Mother        Allergies: Ace Inhibitors          Coughing    Medications:    No current facility-administered medications on file prior to encounter.    Current Outpatient Prescriptions on File Prior to Encounter:  METOPROLOL SUCCINATE ONCE DAILY Disp: 90 tablet Rfl: 2   Ipratropium Bromide 0.03 % Nasal Solution 1-2 sprays each nostril every 12 hours PRN Disp: 1 Bottle Rfl: 2   aspirin 325 MG Oral Tab Take 1 tablet (325 mg total) by mouth daily.  Disp: 30 tablet Rfl: 11   Albuterol Sulfat 06/01/18   1803   PTP  14.1   INR  1.05       Recent Labs   Lab  06/01/18   1803   TROP  0.146*       Imaging: Imaging data reviewed in Epic. ASSESSMENT / PLAN:     1. SOB due to acute CHF exacerbation due to dietary indiscretion  1.  Fluid restrict, I

## 2018-06-02 NOTE — RESPIRATORY THERAPY NOTE
BRITNEY - Equipment Use Daily Summary:  · Set Mode cpap  · Usage in hours:1:18  · 90% Pressure (EPAP) level: 7.0  · 90% Insp Pressure (IPAP):   · AHI: 2.3  · Supplemental Oxygen:   · Comments:

## 2018-06-02 NOTE — PROGRESS NOTES
[facility]  Progress Note     Jena Mcburney Patient Status:  Inpatient    1945 MRN [id number]   [facility] 8NE-A Attending Izabela Dyer MD   Hosp Day # 1 PCP [address] [place] [address]     Chief Complaint: chest pain    S: P 324 mg Oral Once   • aspirin  325 mg Oral Daily   • Fluticasone Furoate-Vilanterol  1 puff Inhalation Daily   • finasteride  5 mg Oral Daily   • gabapentin  300 mg Oral TID   • Ipratropium Bromide  1 spray Each Nare BID   • Levothyroxine Sodium  250 mcg Or

## 2018-06-02 NOTE — PROGRESS NOTES
06/02/18 0055   Provider Notification   Reason for Communication (elevated troponin, cp)   Provider Name (Dr. Roly Jara)   Method of Communication Page   Response Phone call  (NPO)   Notification Time [id number]

## 2018-06-02 NOTE — CONSULTS
[facility] Cardiology Consultation    Liana Gee Patient Status:  Inpatient    1945 MRN [id number]   [facility] 8NE-A Attending Aretha Medina MD   Hosp Day # 1 PCP [address] [place] [address],      Reason for Consultation:  NSTEMI      Hist blood pressure    • High cholesterol    • History of noncompliance with medical treatment 9/07   • Hypercholesterolemia    • Hyperlipidemia    • Hypokalemia 9/2007   • Leukocytosis 11/09/2007   • Mal[facility] 3/26/2012    pt does no Oral Daily   • Fluticasone Furoate-Vilanterol  1 puff Inhalation Daily   • finasteride  5 mg Oral Daily   • gabapentin  300 mg Oral TID   • Ipratropium Bromide  1 spray Each Nare BID   • Levothyroxine Sodium  250 mcg Oral Before breakfast   • losartan  100 3.6       Recent Labs   Lab  06/01/18   1803   PTP  14.1   INR  1.05       Recent Labs   Lab  06/01/18   1803  06/01/18   2351   TROP  0.146*  0.928*         Impression:   1. Chest pain, elevated troponins - c/w NSTEMI. Pain free at present.   2. Remote CA

## 2018-06-02 NOTE — SIGNIFICANT EVENT
Received patient from ED. He is alert and oriented and still complaining of slight cp. He was oriented to room set up. Needs attended.

## 2018-06-03 PROCEDURE — 99232 SBSQ HOSP IP/OBS MODERATE 35: CPT | Performed by: HOSPITALIST

## 2018-06-03 RX ORDER — ASPIRIN 81 MG/1
324 TABLET, CHEWABLE ORAL ONCE
Status: DISCONTINUED | OUTPATIENT
Start: 2018-06-04 | End: 2018-06-04 | Stop reason: HOSPADM

## 2018-06-03 RX ORDER — SODIUM CHLORIDE 9 MG/ML
INJECTION, SOLUTION INTRAVENOUS CONTINUOUS
Status: DISCONTINUED | OUTPATIENT
Start: 2018-06-04 | End: 2018-06-03

## 2018-06-03 RX ORDER — SODIUM CHLORIDE 9 MG/ML
INJECTION, SOLUTION INTRAVENOUS CONTINUOUS
Status: DISCONTINUED | OUTPATIENT
Start: 2018-06-03 | End: 2018-06-04

## 2018-06-03 NOTE — HOME CARE LIAISON
Referral received from [facility]. Patient scheduled for cardiac cath on Monday. Will continue to follow and see patient after procedure.   Thank you for the referral

## 2018-06-03 NOTE — PROGRESS NOTES
[facility] Cardiology Progress Note        Beatriz Nails Patient Status:  Inpatient    1945 MRN [id number]   [facility] 8NE-A Attending Brenda Abdi MD   Hosp Day # 2  [facility] sinus    EKG:      Echo:      Cardiac Cath:      Labs:  HEM:  Recent Labs   Lab  06/01/18   1803  06/02/18   0609  06/02/18   0744  06/03/18   0444   WBC  11.2  10.3  12.5   --    HGB  13.5  13.0  14.0   --    PLT  189.0  184.0  198.0  165.0       Chem:  R

## 2018-06-03 NOTE — PROGRESS NOTES
[facility]PITAL[facility]  Progress Note     Pato Zapata Patient Status:  Inpatient    1945 MRN [id number]   [address] [place] Attending Abdias Pinedo MD   Hosp Day # 2 PCP [address] [place] [address]     Chief Complaint: chest pain    S: n 06/02/18   0609  06/02/18   1254   TROP  0.928*  2.560*  2.880*            Imaging: Imaging data reviewed in Epic.     Medications:   • [START ON 6/4/2018] aspirin  324 mg Oral Once   • aspirin  325 mg Oral Daily   • Fluticasone Furoate-Vilanterol  1 puff I

## 2018-06-03 NOTE — RESPIRATORY THERAPY NOTE
BRITNEY - Equipment Use Daily Summary:  · Set Mode CFLEX  · Usage in hours: 7:30  · 90% Pressure (EPAP) level:   · 90% Insp Pressure (IPAP): 7  · AHI: 8  · Supplemental Oxygen:  · Comments:

## 2018-06-04 ENCOUNTER — APPOINTMENT (OUTPATIENT)
Dept: INTERVENTIONAL RADIOLOGY/VASCULAR | Facility: HOSPITAL | Age: 73
DRG: 246 | End: 2018-06-04
Attending: INTERNAL MEDICINE
Payer: MEDICARE

## 2018-06-04 PROCEDURE — B215YZZ FLUOROSCOPY OF LEFT HEART USING OTHER CONTRAST: ICD-10-PCS | Performed by: INTERNAL MEDICINE

## 2018-06-04 PROCEDURE — 027035Z DILATION OF CORONARY ARTERY, ONE ARTERY WITH TWO DRUG-ELUTING INTRALUMINAL DEVICES, PERCUTANEOUS APPROACH: ICD-10-PCS | Performed by: INTERNAL MEDICINE

## 2018-06-04 PROCEDURE — 99232 SBSQ HOSP IP/OBS MODERATE 35: CPT | Performed by: HOSPITALIST

## 2018-06-04 PROCEDURE — B211YZZ FLUOROSCOPY OF MULTIPLE CORONARY ARTERIES USING OTHER CONTRAST: ICD-10-PCS | Performed by: INTERNAL MEDICINE

## 2018-06-04 PROCEDURE — 4A023N7 MEASUREMENT OF CARDIAC SAMPLING AND PRESSURE, LEFT HEART, PERCUTANEOUS APPROACH: ICD-10-PCS | Performed by: INTERNAL MEDICINE

## 2018-06-04 RX ORDER — HEPARIN SODIUM 10000 [USP'U]/100ML
INJECTION, SOLUTION INTRAVENOUS
Status: COMPLETED
Start: 2018-06-04 | End: 2018-06-04

## 2018-06-04 RX ORDER — PRASUGREL 10 MG/1
10 TABLET, FILM COATED ORAL DAILY
Status: DISCONTINUED | OUTPATIENT
Start: 2018-06-05 | End: 2018-06-05

## 2018-06-04 RX ORDER — MIDAZOLAM HYDROCHLORIDE 1 MG/ML
INJECTION INTRAMUSCULAR; INTRAVENOUS
Status: COMPLETED
Start: 2018-06-04 | End: 2018-06-04

## 2018-06-04 RX ORDER — SODIUM CHLORIDE 9 MG/ML
INJECTION, SOLUTION INTRAVENOUS CONTINUOUS
Status: ACTIVE | OUTPATIENT
Start: 2018-06-04 | End: 2018-06-04

## 2018-06-04 RX ORDER — PRASUGREL 10 MG/1
TABLET, FILM COATED ORAL
Status: COMPLETED
Start: 2018-06-04 | End: 2018-06-04

## 2018-06-04 RX ORDER — VERAPAMIL HYDROCHLORIDE 2.5 MG/ML
INJECTION, SOLUTION INTRAVENOUS
Status: COMPLETED
Start: 2018-06-04 | End: 2018-06-04

## 2018-06-04 RX ORDER — ASPIRIN 81 MG/1
81 TABLET ORAL DAILY
Status: DISCONTINUED | OUTPATIENT
Start: 2018-06-05 | End: 2018-06-06

## 2018-06-04 RX ORDER — HEPARIN SODIUM 5000 [USP'U]/ML
INJECTION, SOLUTION INTRAVENOUS; SUBCUTANEOUS
Status: COMPLETED
Start: 2018-06-04 | End: 2018-06-04

## 2018-06-04 RX ORDER — HEPARIN SODIUM 10000 [USP'U]/100ML
7 INJECTION, SOLUTION INTRAVENOUS CONTINUOUS
Status: DISCONTINUED | OUTPATIENT
Start: 2018-06-04 | End: 2018-06-06

## 2018-06-04 RX ORDER — LIDOCAINE HYDROCHLORIDE 10 MG/ML
INJECTION, SOLUTION EPIDURAL; INFILTRATION; INTRACAUDAL; PERINEURAL
Status: COMPLETED
Start: 2018-06-04 | End: 2018-06-04

## 2018-06-04 NOTE — DIETARY NOTE
Nutrition Short Note    Dietitian consult received per cardiac rehab/CHF protocol. Pt to be educated by cardiac rehab staff and encouraged to attend outpatient classes taught by AMIRA. AMIRA available PRN.     Allison Valerio RD, MISAN  Pager #[phone]

## 2018-06-04 NOTE — RESPIRATORY THERAPY NOTE
BRITNEY : EQUIPMENT USE: DAILY SUMMARY                                            SET MODE:  CPAP WITH CFLEX                                          USAGE IN HOURS: 9:07                                          90% EX

## 2018-06-04 NOTE — PROGRESS NOTES
Post Procedure Note  Left heart catheterization, left ventricular gram, bilateral selective coronary angiogram, right radial site.   Prelim:  -RCA totally occluded  -Prior LAD stent patent  -Previous occluded OM still occluded, no change  -Akinesis of the i

## 2018-06-04 NOTE — PROCEDURES
[facility] Patient Status:  Inpatient    1945 MRN [id number]   Location [facility] Attending Sarah Resendez MD   Hosp Day # 3 PCP TRISTAN POZO,                                        Card The catheter was removed over a J-tipped guidewire. Then a 6 Barbadian pigtail catheter was loaded onto the J-tipped guidewire and advanced into the ascending aorta, and across the aortic valve into the left ventricle.  Hemodynamics were recorded in the l LVEDP 15.     Left coronary Angiography:-  -Left main-was 1 cm length and normal  -LAD-the LAD was a moderate-sized vessel reaching the tip of left ventricular apex and giving off several diagonal branches the previously stented portion of the proximal LAD

## 2018-06-04 NOTE — PROGRESS NOTES
[facility] Cardiology Progress Note - Eula Griffith Patient Status:  Inpatient    1945 MRN [id number]   [facility] 8NE-A Attending Myron Aviles MD   Hosp Day # 3 PCP TRISTAN POZO, DO     Subjective:  T Hyperglycemia     Type 2 diabetes mellitus with hyperglycemia, without long-term current use of insulin (HCC)     Hypothyroidism     Class 2 obesity with serious comorbidity and body mass index (BMI) of 38.0 to 38.9 in adult     Venous insufficiency     Pe edema. Peripheral pulses are 2+. Neurologic: Alert and oriented, normal affect. No motor or coordinational deficit. Skin: Warm and dry.      Laboratory/Data:    Labs:         Recent Labs   Lab  06/01/18   1803  06/02/18   0582  06/02/18   0744  06/03/18 (NORCO)  MG per tab 1 tablet 1 tablet Oral Q6H PRN   Ipratropium Bromide (ATROVENT) 0.03 % nasal spray 1 spray 1 spray Each Nare BID   Levothyroxine Sodium (SYNTHROID, LEVOTHROID) tab 250 mcg 250 mcg Oral Before breakfast   losartan (COZAAR) tab 100

## 2018-06-04 NOTE — PROGRESS NOTES
[facility]  Progress Note     Pato Zapata Patient Status:  Inpatient    1945 MRN [id number]   [facility] 8NE-A Attending Abdias Pinedo MD   Hosp Day # 3 PCP [address] [place] [address],      Chief Complaint: chest pain    S: s of 1.72 mg/dL (H)).     Recent Labs   Lab  06/01/18   1803  06/03/18   0800  06/04/18   0437   PTP  14.1  14.6  14.6   INR  1.05  1.10  1.10       Recent Labs   Lab  06/01/18   2351  06/02/18   0609  06/02/18   1254   TROP  0.928*  2.560*  2.880*

## 2018-06-04 NOTE — HOME CARE LIAISON
Met with pt at the bedside. He is agreeable to [facility] services. Brochure and liaison care provided. Any pt/family questions answered. Will follow.

## 2018-06-04 NOTE — PROCEDURES
PTCA and drug-eluting stent RCA. Using the previously placed right radial 6 Iraqi sheath, a JR4 Sole guide was placed in the ascending aorta and cannulated the RCA roadmapping angiogram was performed. This revealed a totally occluded RCA.   Patient wa

## 2018-06-05 PROCEDURE — 99232 SBSQ HOSP IP/OBS MODERATE 35: CPT | Performed by: HOSPITALIST

## 2018-06-05 RX ORDER — GLIPIZIDE 5 MG/1
5 TABLET ORAL
Qty: 30 TABLET | Refills: 0 | Status: SHIPPED | OUTPATIENT
Start: 2018-06-05 | End: 2018-07-31

## 2018-06-05 RX ORDER — ASPIRIN 81 MG/1
81 TABLET ORAL DAILY
Qty: 100 TABLET | Refills: 3 | Status: SHIPPED | OUTPATIENT
Start: 2018-06-06

## 2018-06-05 RX ORDER — CLOPIDOGREL BISULFATE 75 MG/1
75 TABLET ORAL DAILY
Qty: 30 TABLET | Refills: 6 | Status: SHIPPED | OUTPATIENT
Start: 2018-06-05 | End: 2018-12-26

## 2018-06-05 RX ORDER — CLOPIDOGREL BISULFATE 75 MG/1
75 TABLET ORAL DAILY
Status: DISCONTINUED | OUTPATIENT
Start: 2018-06-05 | End: 2018-06-06

## 2018-06-05 NOTE — CARDIAC REHAB
Per pt has transportation issues, doesn't drive. Pt lives with son who lives 5 min from [place]. Spoke with son on phone, discussed importance of cardiac rehab, gave pt and son phone # for [place] cardiac rehab,  [phone].

## 2018-06-05 NOTE — DIETARY NOTE
Nutrition Short Note    Dietitian consult received for heart failure education. Pt also screened for HGBA1C >8%. Appropriate education and handout provided. Reviewed low NA diet. Pt doesn't use salt, and does not use a lot of high NA foods.   He does eat ou

## 2018-06-05 NOTE — CM/SW NOTE
Request for lifevest received at [facility] await approval--representative alma to contact nurse @ [phone] with outcome

## 2018-06-05 NOTE — PLAN OF CARE
Pt had 5 beats of v tach at 0244. Pt asymptomatic lying in bed watching TV. Will continue to monitor.

## 2018-06-05 NOTE — PROGRESS NOTES
[facility]  Progress Note     Judie Zelaya Patient Status:  Inpatient    1945 MRN [id number]   [facility] 8NE-A Attending Milton Escoto MD   Hosp Day # 4 [address]     Chief Complaint: chest pain    S: n 25.0   ALKPHO  96   --    --    --    --    AST  32   --    --    --    --    ALT  26   --    --    --    --    BILT  0.2   --    --    --    --    TP  7.2   --    --    --    --     < > = values in this interval not displayed.        Estimated Creatinine C full  · [place]: no  · Central line: no    Estimated date of discharge: Today  Discharge is dependent on: progress  At this point Mr. Nathan Weiner is expected to be discharge to: home    Plan of care discussed with patient    Yimi Vazquez MD

## 2018-06-05 NOTE — PROGRESS NOTES
[facility] Cardiology Progress Note - Lakeshiaaubreycaroline Celia Patient Status:  Inpatient    1945 MRN [id number]   [facility] 8NE-A Attending Arian Cortez MD   Hosp Day # 4 PCP TRISTAN POZO, DO     Subjective:  T comorbidity and body mass index (BMI) of 38.0 to 38.9 in adult     Venous insufficiency     Peripheral edema     Acute renal insufficiency     Ventral hernia without obstruction or gangrene     Urinary retention     JAMES (acute kidney injury) ([address]     CKD deficit. Skin: Warm and dry.      Laboratory/Data:    Labs:         Recent Labs   Lab  06/01/18   1803  06/02/18   0609  06/02/18   0744  06/03/18   0444  06/03/18   0800  06/04/18   0437  06/05/18   0447   WBC  11.2  10.3  12.5   --    --    --   9.3   HG Daily   gabapentin (NEURONTIN) cap 300 mg 300 mg Oral TID   HYDROcodone-acetaminophen (NORCO)  MG per tab 1 tablet 1 tablet Oral Q6H PRN   Ipratropium Bromide (ATROVENT) 0.03 % nasal spray 1 spray 1 spray Each Nare BID   Levothyroxine Sodium (SYNTHRO MD  6/5/2018  9:39 AM

## 2018-06-05 NOTE — CM/SW NOTE
In anticipation for need for lifevest, spoke with Ayanna marquez--awaiting completion of order form with supporting documents to fax to [place] [phone]

## 2018-06-05 NOTE — CM/SW NOTE
Completed life vest order form with supporting documents faxed to [facility]  [phone]

## 2018-06-05 NOTE — RESPIRATORY THERAPY NOTE
BRITNEY - Equipment Use Daily Summary:                  . Set Mode:  CPAP WITH C-FLEX                . Usage in hours: 10:10                . 90% Pressure (EPAP) level: 7.0                . 90% Insp. Pressure (IPAP): [facility] AHI: 4.4                .  Isidro Maxwell

## 2018-06-06 VITALS
BODY MASS INDEX: 39.17 KG/M2 | WEIGHT: 315 LBS | RESPIRATION RATE: 20 BRPM | HEIGHT: 75 IN | SYSTOLIC BLOOD PRESSURE: 133 MMHG | OXYGEN SATURATION: 96 % | HEART RATE: 66 BPM | DIASTOLIC BLOOD PRESSURE: 63 MMHG | TEMPERATURE: 99 F

## 2018-06-06 PROCEDURE — 99239 HOSP IP/OBS DSCHRG MGMT >30: CPT | Performed by: INTERNAL MEDICINE

## 2018-06-06 RX ORDER — SIMVASTATIN 40 MG
40 TABLET ORAL NIGHTLY
Qty: 90 TABLET | Refills: 0 | Status: SHIPPED | OUTPATIENT
Start: 2018-06-06 | End: 2018-07-31

## 2018-06-06 NOTE — PROGRESS NOTES
[facility]  Progress Note     Beatriz Nails Patient Status:  Inpatient    1945 MRN [id number]   [facility] 8NE-A Attending Brenda Abdi MD   Hosp Day # 5 [address]     Chief Complaint: chest pain    S: P --    --    --    --    ALT  26   --    --    --    --    BILT  0.2   --    --    --    --    TP  7.2   --    --    --    --     < > = values in this interval not displayed.        Estimated Creatinine Clearance: 43.9 mL/min (A) (based on SCr of 1.79 mg/dL care discussed with patient, PAKO Plata MD

## 2018-06-06 NOTE — PLAN OF CARE
NURSING DISCHARGE NOTE    Discharged 0999 via wheelchair. Accompanied by transport  Belongings all taken with patient. Pt verbalzies discharge understanding of medications and all s/p cath restrictions. Grand daughter presnt for instructions.    Pt

## 2018-06-06 NOTE — RESPIRATORY THERAPY NOTE
BRITNEY - Equipment Use Daily Summary:  · Set Mode CPAP  · Usage in hours: 9:54  · 90% Pressure (EPAP) level: 7.0  · 90% Insp Pressure (IPAP):   · AHI: 1.1  · Supplemental Oxygen:   · Comments:

## 2018-06-06 NOTE — PROGRESS NOTES
Cardiology follow-up  Approval for LifeVest was unable to be completed yesterday, plan for this morning. Patient is well overnight stable vital signs no new symptoms. Exam normal.  Will discharge patient later this morning.

## 2018-06-06 NOTE — PLAN OF CARE
Assumed care of pt at 1100. Walked in and found patient to be bending wrist and applying additional pressure to wrist. RN reviewed precautions of s/p radial procedures. Pt non- really receptive of changing.  He stated, \"well I have a bad shoulder on t

## 2018-06-06 NOTE — PLAN OF CARE
Patient is alert and oriented. On room air, CPAP at night. Denies any pain or SOB. Right radial wrist is C/D/I. Patient is awaiting for life vest approval, then discharge. POC updated with patient and son via phone, all questions answered.  Call light wit

## 2018-06-06 NOTE — DISCHARGE SUMMARY
[facility] [place] HOSPITALIST  DISCHARGE SUMMARY     Milo Oropeza Patient Status:  Inpatient    1945 MRN [id number]   [facility] 8NE-A Attending Cherelle Jimenez MD   [facility] Day # 5 PCP TRISTAN POZO DO     Date of Admission: 2018 tablet  Refills:  3     Clopidogrel Bisulfate 75 MG Tabs  Commonly known as:  PLAVIX      Take 1 tablet (75 mg total) by mouth daily.    Quantity:  30 tablet  Refills:  6     glipiZIDE 5 MG Tabs  Commonly known as:  GLUCOTROL      Take 1 tablet (5 mg total) daily with (1) 50 mcg tablet daily to equal 250 mcg daily dose.    Quantity:  90 tablet  Refills:  0     Levothyroxine Sodium 50 MCG Tabs  Commonly known as:  SYNTHROID      Take one tablet by mouth daily with (1) 200 mcg tablet daily to equal 250 mcg daily follow-up provider specified.     Vital signs:  Temp:  [98.1 °F (36.7 °C)-98.8 °F (37.1 °C)] 98.7 °F (37.1 °C)  Pulse:  [63-80] 68  Resp:  [18-20] 20  BP: (119-132)/(48-67) 119/62    --------------------------------------------------------------------------

## 2018-06-07 ENCOUNTER — TELEPHONE (OUTPATIENT)
Dept: FAMILY MEDICINE CLINIC | Facility: CLINIC | Age: 73
End: 2018-06-07

## 2018-06-07 ENCOUNTER — PATIENT OUTREACH (OUTPATIENT)
Dept: CASE MANAGEMENT | Age: 73
End: 2018-06-07

## 2018-06-07 DIAGNOSIS — Z02.9 ENCOUNTERS FOR ADMINISTRATIVE PURPOSE: ICD-10-CM

## 2018-06-07 NOTE — TELEPHONE ENCOUNTER
Dr. Damon Skelton from [facility] called again and requesting an order for PT for his unsteady gait. Please advise. Thanks.

## 2018-06-07 NOTE — TELEPHONE ENCOUNTER
PAKO Lyle w/[facility] transferred to Triage. Per Valdo, she is with the pt now, pt fell this morning in his bathroom but pt refused paramedics request to go to the ER. Per pt, he was just discharged from the Hospital yesterday.   Valdo transferred to Triage

## 2018-06-07 NOTE — TELEPHONE ENCOUNTER
Spoke with Valdo from [facility] and she stated that she was there to see pt and to start his Home healthcare and was informed that pt fell earlier in the shower and dented the wall. Pt refused the paramedics who was called because he didn't want to go back to the hospital.  Spoke to pt and informed him of Dr. Pawan marino and he refused and requested to be left alone. He stated that this happened before and he had memory loss and turned out fine. He refused his family's request also. Valdo wants to know if she should continue with the evaluation and start the process for home healthcare? Per Dr. Janis Oro ok to go forward with the evaluation.

## 2018-06-08 RX ORDER — GABAPENTIN 300 MG/1
CAPSULE ORAL
Qty: 90 CAPSULE | Refills: 0 | Status: SHIPPED | OUTPATIENT
Start: 2018-06-08 | End: 2018-07-08

## 2018-06-11 ENCOUNTER — TELEPHONE (OUTPATIENT)
Dept: OTHER | Facility: HOSPITAL | Age: 73
End: 2018-06-11

## 2018-06-12 PROBLEM — I47.2 VENTRICULAR TACHYARRHYTHMIA (HCC): Status: ACTIVE | Noted: 2018-06-12

## 2018-06-12 PROBLEM — I42.0 DCM (DILATED CARDIOMYOPATHY) (HCC): Status: ACTIVE | Noted: 2018-06-12

## 2018-06-12 PROBLEM — I47.20 VENTRICULAR TACHYARRHYTHMIA: Status: ACTIVE | Noted: 2018-06-12

## 2018-06-12 PROBLEM — I47.20 VENTRICULAR TACHYARRHYTHMIA (HCC): Status: ACTIVE | Noted: 2018-06-12

## 2018-06-13 ENCOUNTER — OFFICE VISIT (OUTPATIENT)
Dept: FAMILY MEDICINE CLINIC | Facility: CLINIC | Age: 73
End: 2018-06-13

## 2018-06-13 VITALS
HEART RATE: 76 BPM | DIASTOLIC BLOOD PRESSURE: 68 MMHG | WEIGHT: 313 LBS | BODY MASS INDEX: 38.92 KG/M2 | TEMPERATURE: 97 F | HEIGHT: 75 IN | RESPIRATION RATE: 16 BRPM | SYSTOLIC BLOOD PRESSURE: 114 MMHG

## 2018-06-13 DIAGNOSIS — E11.22 TYPE 2 DIABETES MELLITUS WITH DIABETIC CHRONIC KIDNEY DISEASE, UNSPECIFIED CKD STAGE, UNSPECIFIED WHETHER LONG TERM INSULIN USE (HCC): ICD-10-CM

## 2018-06-13 DIAGNOSIS — I21.4 NSTEMI (NON-ST ELEVATED MYOCARDIAL INFARCTION) (HCC): Primary | ICD-10-CM

## 2018-06-13 PROCEDURE — 99496 TRANSJ CARE MGMT HIGH F2F 7D: CPT | Performed by: FAMILY MEDICINE

## 2018-06-13 PROCEDURE — 1111F DSCHRG MED/CURRENT MED MERGE: CPT | Performed by: FAMILY MEDICINE

## 2018-06-13 RX ORDER — CLONAZEPAM 1 MG/1
1 TABLET ORAL 2 TIMES DAILY PRN
Qty: 60 TABLET | Refills: 2 | Status: SHIPPED | OUTPATIENT
Start: 2018-06-13 | End: 2018-10-01

## 2018-06-13 NOTE — PROGRESS NOTES
HPI:    Reji Lara is a 67year old male here today for hospital follow up.    He was discharged from Inpatient hospital, [facility] to Home   Admit Date: 6/1/18  Discharge Date: 6/6/18  Hospital Discharge Diagnosis:    NSTEMI, diabetes    Inte cardiology following discharge. He is still wearing his LifeVest.    Allergies:  He is allergic to ace inhibitors.     Current Meds:    Current Outpatient Prescriptions on File Prior to Visit:  GABAPENTIN 300 MG Oral Cap TAKE 1 CAPSULE BY MOUTH THREE TIMES as needed for Pain. Ipratropium Bromide 0.03 % Nasal Solution 1-2 sprays each nostril every 12 hours PRN     No current facility-administered medications on file prior to visit.        HISTORY: reconciled and reviewed with patient  He  has a past medical cancer in his mother. He  reports that he has been smoking Cigarettes. He has a 22.50 pack-year smoking history. He has never used smokeless tobacco. He reports that he does not drink alcohol or use drugs.      ROS:   GENERAL: weight stable, energy stabl Chest pain resolved  -  Continue lifevest  -  Continue ASA, bb, losartan and effient  -  Further recs per cardiology    Type 2 diabetes mellitus with diabetic chronic kidney disease, unspecified CKD stage, unspecified whether long term insulin use ([facility]  - Significant Complications, Morbidity, and/or Mortality: severe    Overall Risk:   severe    Patient seen within 7 days from date of discharge.      [facility], , 6/13/2018

## 2018-06-18 ENCOUNTER — TELEPHONE (OUTPATIENT)
Dept: FAMILY MEDICINE CLINIC | Facility: CLINIC | Age: 73
End: 2018-06-18

## 2018-06-18 NOTE — TELEPHONE ENCOUNTER
Pt said his pain is in his upper back. It only hurts when he has his life vest on. When the vest is off he does not have pain. I explained to him that you would like him to have an xray to make sure there is no compression fracture. Patient refuses.  He sta

## 2018-06-18 NOTE — TELEPHONE ENCOUNTER
Call from [facility] home health-sts \"pt seen today. Has hx of fall at home/in shower (see 6/7/18 phone call), pt refused paramedics or return to ER for eval at that time.  Continues to c/o dull achy mid upper back pain which is limiting his parti

## 2018-06-18 NOTE — TELEPHONE ENCOUNTER
My concern is that patient may have a compression fracture. Please obtain x-rays of the spine. Please find out where he is having the most pain in order likely thoracic spine x-ray.

## 2018-06-21 ENCOUNTER — PATIENT OUTREACH (OUTPATIENT)
Dept: FAMILY MEDICINE CLINIC | Facility: CLINIC | Age: 73
End: 2018-06-21

## 2018-06-21 DIAGNOSIS — E03.9 ACQUIRED HYPOTHYROIDISM: ICD-10-CM

## 2018-06-21 RX ORDER — LEVOTHYROXINE SODIUM 0.03 MG/1
TABLET ORAL
Qty: 90 TABLET | Refills: 0 | OUTPATIENT
Start: 2018-06-21

## 2018-06-25 RX ORDER — LEVOTHYROXINE SODIUM 0.05 MG/1
TABLET ORAL
Qty: 30 TABLET | Refills: 0 | Status: SHIPPED | OUTPATIENT
Start: 2018-06-25 | End: 2018-06-25

## 2018-06-26 NOTE — TELEPHONE ENCOUNTER
Please contact pt, his thyroid labs were abnormal in March and was asked to recheck then in 6 weeks. Please have him do this and we can refill, orders have been placed. Thanks!

## 2018-06-26 NOTE — TELEPHONE ENCOUNTER
Pls contact pt and have him repeat his thyroid labs now. He had his dose adjusted in March and we need to check his current levels in order to refill or adjust his medication. Please tell him this cannot wait until his appt in August. Thanks!

## 2018-06-27 ENCOUNTER — APPOINTMENT (OUTPATIENT)
Dept: LAB | Age: 73
End: 2018-06-27
Attending: FAMILY MEDICINE
Payer: MEDICARE

## 2018-06-27 DIAGNOSIS — E11.22 TYPE 2 DIABETES MELLITUS WITH DIABETIC CHRONIC KIDNEY DISEASE, UNSPECIFIED CKD STAGE, UNSPECIFIED WHETHER LONG TERM INSULIN USE (HCC): ICD-10-CM

## 2018-06-27 LAB
EST. AVERAGE GLUCOSE BLD GHB EST-MCNC: 174 MG/DL (ref 68–126)
HBA1C MFR BLD HPLC: 7.7 % (ref ?–5.7)

## 2018-06-27 PROCEDURE — 36415 COLL VENOUS BLD VENIPUNCTURE: CPT

## 2018-06-27 PROCEDURE — 83036 HEMOGLOBIN GLYCOSYLATED A1C: CPT

## 2018-06-27 RX ORDER — LEVOTHYROXINE SODIUM 0.05 MG/1
TABLET ORAL
Qty: 30 TABLET | Refills: 0 | Status: SHIPPED | OUTPATIENT
Start: 2018-06-27 | End: 2018-09-04

## 2018-07-03 NOTE — TELEPHONE ENCOUNTER
The patient was prescribed GlipiZIDE 5 MG while in the hospital.  He is asking for a refill. See pended med.   Please  advise

## 2018-07-03 NOTE — TELEPHONE ENCOUNTER
glipiZIDE 5 MG    Was prescribed while at the hospital, pt is out and needs refill. Was told that PCP needed to fill this.      Please send to [place] [facility]

## 2018-07-04 RX ORDER — GLIPIZIDE 5 MG/1
5 TABLET ORAL
Qty: 90 TABLET | Refills: 0 | Status: SHIPPED | OUTPATIENT
Start: 2018-07-04 | End: 2018-09-26

## 2018-07-05 ENCOUNTER — TELEPHONE (OUTPATIENT)
Dept: FAMILY MEDICINE CLINIC | Facility: CLINIC | Age: 73
End: 2018-07-05

## 2018-07-09 RX ORDER — GABAPENTIN 300 MG/1
CAPSULE ORAL
Qty: 90 CAPSULE | Refills: 0 | Status: SHIPPED | OUTPATIENT
Start: 2018-07-09 | End: 2018-08-13

## 2018-07-12 RX ORDER — CLONAZEPAM 1 MG/1
1 TABLET ORAL 2 TIMES DAILY PRN
Qty: 60 TABLET | Refills: 2 | OUTPATIENT
Start: 2018-07-12

## 2018-07-12 RX ORDER — CLONAZEPAM 2 MG/1
TABLET ORAL
Qty: 30 TABLET | Refills: 0 | Status: SHIPPED | OUTPATIENT
Start: 2018-07-12 | End: 2018-07-31

## 2018-07-18 ENCOUNTER — APPOINTMENT (OUTPATIENT)
Dept: LAB | Age: 73
End: 2018-07-18
Attending: UROLOGY
Payer: MEDICARE

## 2018-07-18 DIAGNOSIS — R33.9 RETENTION, URINE: ICD-10-CM

## 2018-07-18 DIAGNOSIS — C61 PROSTATE CANCER (HCC): ICD-10-CM

## 2018-07-18 LAB — PSA SERPL-MCNC: 2.65 NG/ML (ref 0.01–4)

## 2018-07-18 PROCEDURE — 84153 ASSAY OF PSA TOTAL: CPT

## 2018-07-18 PROCEDURE — 36415 COLL VENOUS BLD VENIPUNCTURE: CPT

## 2018-08-07 ENCOUNTER — MED REC SCAN ONLY (OUTPATIENT)
Dept: FAMILY MEDICINE CLINIC | Facility: CLINIC | Age: 73
End: 2018-08-07

## 2018-08-10 ENCOUNTER — OFFICE VISIT (OUTPATIENT)
Dept: FAMILY MEDICINE CLINIC | Facility: CLINIC | Age: 73
End: 2018-08-10
Payer: MEDICARE

## 2018-08-10 VITALS
HEART RATE: 100 BPM | OXYGEN SATURATION: 96 % | DIASTOLIC BLOOD PRESSURE: 68 MMHG | RESPIRATION RATE: 18 BRPM | TEMPERATURE: 97 F | BODY MASS INDEX: 38.67 KG/M2 | SYSTOLIC BLOOD PRESSURE: 120 MMHG | HEIGHT: 75 IN | WEIGHT: 311 LBS

## 2018-08-10 DIAGNOSIS — I50.812 CHRONIC RIGHT-SIDED HEART FAILURE (HCC): ICD-10-CM

## 2018-08-10 DIAGNOSIS — N18.4 TYPE 2 DIABETES MELLITUS WITH STAGE 4 CHRONIC KIDNEY DISEASE, WITHOUT LONG-TERM CURRENT USE OF INSULIN (HCC): Primary | ICD-10-CM

## 2018-08-10 DIAGNOSIS — E11.22 TYPE 2 DIABETES MELLITUS WITH STAGE 4 CHRONIC KIDNEY DISEASE, WITHOUT LONG-TERM CURRENT USE OF INSULIN (HCC): Primary | ICD-10-CM

## 2018-08-10 DIAGNOSIS — J44.9 CHRONIC OBSTRUCTIVE PULMONARY DISEASE, UNSPECIFIED COPD TYPE (HCC): ICD-10-CM

## 2018-08-10 DIAGNOSIS — I10 ESSENTIAL HYPERTENSION WITH GOAL BLOOD PRESSURE LESS THAN 140/90: ICD-10-CM

## 2018-08-10 PROCEDURE — 99214 OFFICE O/P EST MOD 30 MIN: CPT | Performed by: FAMILY MEDICINE

## 2018-08-11 NOTE — PROGRESS NOTES
[facility] Family Medicine Office Note  Chief Complaint:   Patient presents with:  Medication Follow-Up: DM check      HPI:   This is a 67year old male coming in for DM2, HTN, COPD, and CHF    1.   DM2 - The patient presents for recheck of his [facility] 9/7/07    technically difficult study d/t anatomic reasons, mild inferior basilar hypokinesia, mild concentric left ventricular hypertrophy, decreased left ventricular compliance   • Elevated lipids    • Elevated PSA    • Enlarged thyroid    • Essential hy THYROIDECTOMY  Social History:  Smoking status: Current Every Day Smoker                                                   Packs/day: 0.50      Years: 45.00        Types: Cigarettes  Smokeless tobacco: Never Used                      Comment: 1 pack every daily. Disp: 100 tablet Rfl: 3   Clopidogrel Bisulfate 75 MG Oral Tab Take 1 tablet (75 mg total) by mouth daily. Disp: 30 tablet Rfl: 6   torsemide 20 MG Oral Tab Take 3 tablets daily. total 60 mg Disp: 270 tablet Rfl: 3   METOPROLOL SUCCINATE ER 50 MG Ora SpO2 96%   BMI 38.87 kg/m²  Estimated body mass index is 38.87 kg/m² as calculated from the following:    Height as of this encounter: 75\". Weight as of this encounter: 311 lb. Vital signs reviewed. Appears stated age, well groomed.   Physical Exam:  G worsening  -  Monitor BP at home  -  Refer to nephrology   -  Further recs per cardiology      Meds & Refills for this Visit:  No prescriptions requested or ordered in this encounter    Health Maintenance:  Diabetes Care Dilated Eye Exam due on 11/20/1945 current use of insulin (HCC)     Hypothyroidism     Class 2 obesity with serious comorbidity and body mass index (BMI) of 38.0 to 38.9 in adult     Venous insufficiency     Peripheral edema     Acute renal insufficiency     Ventral hernia without obstructi

## 2018-08-14 DIAGNOSIS — J43.1 PANLOBULAR EMPHYSEMA (HCC): ICD-10-CM

## 2018-08-14 RX ORDER — GABAPENTIN 300 MG/1
CAPSULE ORAL
Qty: 270 CAPSULE | Refills: 1 | Status: SHIPPED | OUTPATIENT
Start: 2018-08-14 | End: 2019-06-17

## 2018-08-14 RX ORDER — BUDESONIDE AND FORMOTEROL FUMARATE DIHYDRATE 160; 4.5 UG/1; UG/1
AEROSOL RESPIRATORY (INHALATION)
Qty: 3 INHALER | Refills: 0 | Status: SHIPPED | OUTPATIENT
Start: 2018-08-14 | End: 2018-11-03

## 2018-08-14 NOTE — TELEPHONE ENCOUNTER
Non protocol medication   Last refill 7/9/2018. Last appointment 8/10/2018.   Please approve if appropriate, thank you

## 2018-08-17 ENCOUNTER — TELEPHONE (OUTPATIENT)
Dept: FAMILY MEDICINE CLINIC | Facility: CLINIC | Age: 73
End: 2018-08-17

## 2018-08-23 RX ORDER — LEVOTHYROXINE SODIUM 0.05 MG/1
TABLET ORAL
Qty: 90 TABLET | Refills: 0 | Status: SHIPPED | OUTPATIENT
Start: 2018-08-23 | End: 2018-11-29

## 2018-08-23 NOTE — TELEPHONE ENCOUNTER
Please call patient as a reminder to have thyroid blood test done as soon as possible. Was asked to have rechecked x 6 weeks after 3/27/18 blood work. Thanks!

## 2018-09-04 ENCOUNTER — OFFICE VISIT (OUTPATIENT)
Dept: NEPHROLOGY | Facility: CLINIC | Age: 73
End: 2018-09-04
Payer: MEDICARE

## 2018-09-04 VITALS — WEIGHT: 312 LBS | DIASTOLIC BLOOD PRESSURE: 68 MMHG | SYSTOLIC BLOOD PRESSURE: 130 MMHG | BODY MASS INDEX: 39 KG/M2

## 2018-09-04 DIAGNOSIS — N18.30 CKD (CHRONIC KIDNEY DISEASE) STAGE 3, GFR 30-59 ML/MIN (HCC): ICD-10-CM

## 2018-09-04 DIAGNOSIS — R80.9 MICROALBUMINURIA: ICD-10-CM

## 2018-09-04 DIAGNOSIS — N17.9 AKI (ACUTE KIDNEY INJURY) (HCC): ICD-10-CM

## 2018-09-04 DIAGNOSIS — I50.42 CHRONIC COMBINED SYSTOLIC AND DIASTOLIC CONGESTIVE HEART FAILURE (HCC): Primary | ICD-10-CM

## 2018-09-04 PROCEDURE — 99205 OFFICE O/P NEW HI 60 MIN: CPT | Performed by: INTERNAL MEDICINE

## 2018-09-04 NOTE — PROGRESS NOTES
Nephrology Consult Note    REASON FOR CONSULT: CKD 3 / JAMES    ASSESSMENT/PLAN:        1) JAMES- recently higher serum creatinine of 2.3 milligrams per deciliter is likely due to relative volume depletion with ongoing diuretic use superimposed on ARB effect l coronary artery disease, morbid obesity, recent inferior wall myocardial infarction as above. Denies any history of acute renal failure gross microscopic hematuria proteinuric kidney stones or infections.   Reports having a history of congestive heart fail attack [facility])    • Hemorrhoids    • High blood pressure    • High cholesterol    • History of noncompliance with medical treatment 9/07   • Hypercholesterolemia    • Hyperlipidemia    • Hypokalemia 9/2007   • Leukocytosis 11/09/2007   • Malignant neoplasm o Oral Cap TAKE 1 CAPSULE BY MOUTH THREE TIMES DAILY Disp: 270 capsule Rfl: 1   SYMBICORT 160-4.5 MCG/ACT Inhalation Aerosol INHALE 2 PUFFS INTO THE LUNGS TWICE DAILY Disp: 3 Inhaler Rfl: 0   METFORMIN HCL 1000 MG Oral Tab TAKE ONE TABLET BY MOUTH TWICE OMAR Sulfate HFA (PROAIR HFA) 108 (90 BASE) MCG/ACT Inhalation Aero Soln Inhale 2 puffs into the lungs every 6 (six) hours as needed for Wheezing. Disp: 1 Inhaler Rfl: 3       Allergies:     Ace Inhibitors          Coughing    Social History:  Social History

## 2018-09-07 DIAGNOSIS — E11.22 TYPE 2 DIABETES MELLITUS WITH DIABETIC CHRONIC KIDNEY DISEASE, UNSPECIFIED CKD STAGE, UNSPECIFIED WHETHER LONG TERM INSULIN USE (HCC): ICD-10-CM

## 2018-09-07 RX ORDER — PERPHENAZINE 16 MG/1
TABLET, FILM COATED ORAL
Qty: 100 STRIP | Refills: 2 | Status: SHIPPED | OUTPATIENT
Start: 2018-09-07 | End: 2021-01-01 | Stop reason: CLARIF

## 2018-09-10 DIAGNOSIS — E03.9 ACQUIRED HYPOTHYROIDISM: ICD-10-CM

## 2018-09-10 RX ORDER — LEVOTHYROXINE SODIUM 0.2 MG/1
TABLET ORAL
Qty: 90 TABLET | Refills: 0 | Status: SHIPPED | OUTPATIENT
Start: 2018-09-10 | End: 2018-11-29

## 2018-09-19 RX ORDER — LEVOTHYROXINE SODIUM 0.05 MG/1
TABLET ORAL
Qty: 90 TABLET | Refills: 0 | Status: SHIPPED | OUTPATIENT
Start: 2018-09-19 | End: 2018-12-20

## 2018-09-26 ENCOUNTER — HOSPITAL ENCOUNTER (OUTPATIENT)
Dept: ULTRASOUND IMAGING | Facility: HOSPITAL | Age: 73
Discharge: HOME | End: 2018-09-26
Attending: INTERNAL MEDICINE
Payer: MEDICARE

## 2018-09-26 ENCOUNTER — HOSPITAL ENCOUNTER (OUTPATIENT)
Dept: CV DIAGNOSTICS | Facility: HOSPITAL | Age: 73
Discharge: HOME | End: 2018-09-26
Attending: INTERNAL MEDICINE
Payer: MEDICARE

## 2018-09-26 ENCOUNTER — APPOINTMENT (OUTPATIENT)
Dept: LAB | Facility: HOSPITAL | Age: 73
End: 2018-09-26
Attending: INTERNAL MEDICINE
Payer: MEDICARE

## 2018-09-26 DIAGNOSIS — N17.9 AKI (ACUTE KIDNEY INJURY) (HCC): ICD-10-CM

## 2018-09-26 DIAGNOSIS — I50.42 CHRONIC COMBINED SYSTOLIC AND DIASTOLIC CONGESTIVE HEART FAILURE (HCC): ICD-10-CM

## 2018-09-26 DIAGNOSIS — N18.30 CKD (CHRONIC KIDNEY DISEASE) STAGE 3, GFR 30-59 ML/MIN (HCC): ICD-10-CM

## 2018-09-26 DIAGNOSIS — R80.9 MICROALBUMINURIA: ICD-10-CM

## 2018-09-26 DIAGNOSIS — E11.22 TYPE 2 DIABETES MELLITUS WITH DIABETIC CHRONIC KIDNEY DISEASE, UNSPECIFIED CKD STAGE, UNSPECIFIED WHETHER LONG TERM INSULIN USE (HCC): ICD-10-CM

## 2018-09-26 LAB
ANION GAP SERPL CALC-SCNC: 8 MMOL/L (ref 0–18)
BUN BLD-MCNC: 36 MG/DL (ref 8–20)
BUN/CREAT SERPL: 19.1 (ref 10–20)
CALCIUM BLD-MCNC: 8.6 MG/DL (ref 8.3–10.3)
CHLORIDE SERPL-SCNC: 107 MMOL/L (ref 101–111)
CO2 SERPL-SCNC: 23 MMOL/L (ref 22–32)
CREAT BLD-MCNC: 1.88 MG/DL (ref 0.7–1.3)
EST. AVERAGE GLUCOSE BLD GHB EST-MCNC: 134 MG/DL (ref 68–126)
GLUCOSE BLD-MCNC: 71 MG/DL (ref 70–99)
HBA1C MFR BLD HPLC: 6.3 % (ref ?–5.7)
OSMOLALITY SERPL CALC.SUM OF ELEC: 293 MOSM/KG (ref 275–295)
POTASSIUM SERPL-SCNC: 4.9 MMOL/L (ref 3.6–5.1)
SODIUM SERPL-SCNC: 138 MMOL/L (ref 136–144)

## 2018-09-26 PROCEDURE — 36415 COLL VENOUS BLD VENIPUNCTURE: CPT

## 2018-09-26 PROCEDURE — 84165 PROTEIN E-PHORESIS SERUM: CPT

## 2018-09-26 PROCEDURE — 83516 IMMUNOASSAY NONANTIBODY: CPT

## 2018-09-26 PROCEDURE — 76770 US EXAM ABDO BACK WALL COMP: CPT | Performed by: INTERNAL MEDICINE

## 2018-09-26 PROCEDURE — 83883 ASSAY NEPHELOMETRY NOT SPEC: CPT

## 2018-09-26 PROCEDURE — 86255 FLUORESCENT ANTIBODY SCREEN: CPT

## 2018-09-26 PROCEDURE — 93306 TTE W/DOPPLER COMPLETE: CPT | Performed by: INTERNAL MEDICINE

## 2018-09-26 PROCEDURE — 86038 ANTINUCLEAR ANTIBODIES: CPT

## 2018-09-26 PROCEDURE — 83876 ASSAY MYELOPEROXIDASE: CPT

## 2018-09-26 PROCEDURE — 80048 BASIC METABOLIC PNL TOTAL CA: CPT

## 2018-09-26 PROCEDURE — 86334 IMMUNOFIX E-PHORESIS SERUM: CPT

## 2018-09-26 PROCEDURE — 83036 HEMOGLOBIN GLYCOSYLATED A1C: CPT

## 2018-09-26 RX ORDER — GLIPIZIDE 5 MG/1
TABLET ORAL
Qty: 90 TABLET | Refills: 0 | Status: SHIPPED | OUTPATIENT
Start: 2018-09-26 | End: 2018-12-22

## 2018-09-26 NOTE — TELEPHONE ENCOUNTER
Pt's daughter called and would like for the LEVOTHYROXINE SODIUM 50 MCG Oral Tab be switched from a 30 day to a 90 day refill  Please call pt back at [phone]

## 2018-09-26 NOTE — TELEPHONE ENCOUNTER
Lm for Daughter Danielle Kay to call, med sent to [facility] in [place] #[facility] no refills, pt should do TSH labs now that were ordered in May.

## 2018-09-27 LAB — ANA SCREEN: NEGATIVE

## 2018-09-28 LAB
ALBUMIN SERPL-MCNC: 4.11 G/DL (ref 3.5–4.8)
ALBUMIN/GLOB SERPL: 1.65 {RATIO}
ALPHA1 GLOB SERPL ELPH-MCNC: 0.2 G/DL (ref 0.1–0.3)
ALPHA2 GLOB SERPL ELPH-MCNC: 0.88 G/DL (ref 0.6–1)
B-GLOBULIN SERPL ELPH-MCNC: 0.73 G/DL (ref 0.7–1.2)
GAMMA GLOB SERPL ELPH-MCNC: 0.68 G/DL (ref 0.6–1.6)
KAPPA FREE LIGHT CHAIN: 9.3 MG/DL (ref 0.33–1.94)
KAPPA/LAMBDA FLC RATIO: 3.32 (ref 0.26–1.65)
LAMBDA FREE LIGHT CHAIN: 2.8 MG/DL (ref 0.57–2.63)
MAI PROTEIN SERPL-MCNC: 6.6 G/DL (ref 6.1–8.3)

## 2018-09-29 ENCOUNTER — TELEPHONE (OUTPATIENT)
Dept: NEPHROLOGY | Facility: CLINIC | Age: 73
End: 2018-09-29

## 2018-09-29 LAB
MYELOPEROX ANTIBODIES, IGG: 0 AU/ML
SERINE PROTEASE3, IGG: 1 AU/ML

## 2018-09-29 NOTE — TELEPHONE ENCOUNTER
D/w pt- 2d echo noted; cr down to baseline 1.8 mg/dl on lower dose diuretics; all w/u otherwise negative- feels fine on lower dose loop diuretic- CPM- thx vivien

## 2018-10-01 RX ORDER — CLONAZEPAM 1 MG/1
1 TABLET ORAL 2 TIMES DAILY PRN
Qty: 60 TABLET | Refills: 0 | Status: SHIPPED | OUTPATIENT
Start: 2018-10-01 | End: 2018-11-19

## 2018-10-01 NOTE — TELEPHONE ENCOUNTER
Spoke with pt , aware of A1C results and instructions. Pt has not done eye exam this year yet. Pt states he is having heart surgery for pacemaker soon.  Wants refill of Clonazepam, last refill in [place] #[facility] with 2 refills

## 2018-11-03 DIAGNOSIS — J43.1 PANLOBULAR EMPHYSEMA (HCC): ICD-10-CM

## 2018-11-07 RX ORDER — BUDESONIDE AND FORMOTEROL FUMARATE DIHYDRATE 160; 4.5 UG/1; UG/1
AEROSOL RESPIRATORY (INHALATION)
Qty: 2 INHALER | Refills: 0 | Status: SHIPPED | OUTPATIENT
Start: 2018-11-07 | End: 2018-12-31

## 2018-11-15 DIAGNOSIS — E03.9 ACQUIRED HYPOTHYROIDISM: ICD-10-CM

## 2018-11-16 RX ORDER — LEVOTHYROXINE SODIUM 0.2 MG/1
TABLET ORAL
Qty: 90 TABLET | Refills: 0 | Status: SHIPPED | OUTPATIENT
Start: 2018-11-16 | End: 2019-02-14

## 2018-11-19 RX ORDER — CLONAZEPAM 1 MG/1
TABLET ORAL
Qty: 60 TABLET | Refills: 0 | Status: SHIPPED | OUTPATIENT
Start: 2018-11-19 | End: 2018-12-07

## 2018-11-29 ENCOUNTER — OFFICE VISIT (OUTPATIENT)
Dept: FAMILY MEDICINE CLINIC | Facility: CLINIC | Age: 73
End: 2018-11-29
Payer: MEDICARE

## 2018-11-29 VITALS
HEIGHT: 75 IN | HEART RATE: 88 BPM | DIASTOLIC BLOOD PRESSURE: 74 MMHG | OXYGEN SATURATION: 96 % | BODY MASS INDEX: 39.17 KG/M2 | RESPIRATION RATE: 16 BRPM | WEIGHT: 315 LBS | SYSTOLIC BLOOD PRESSURE: 130 MMHG | TEMPERATURE: 98 F

## 2018-11-29 DIAGNOSIS — J44.9 CHRONIC OBSTRUCTIVE PULMONARY DISEASE, UNSPECIFIED COPD TYPE (HCC): ICD-10-CM

## 2018-11-29 DIAGNOSIS — F41.1 GENERALIZED ANXIETY DISORDER: ICD-10-CM

## 2018-11-29 DIAGNOSIS — Z00.00 ENCOUNTER FOR MEDICARE ANNUAL WELLNESS EXAM: Primary | ICD-10-CM

## 2018-11-29 DIAGNOSIS — I10 ESSENTIAL HYPERTENSION WITH GOAL BLOOD PRESSURE LESS THAN 140/90: ICD-10-CM

## 2018-11-29 DIAGNOSIS — E11.22 TYPE 2 DIABETES MELLITUS WITH STAGE 4 CHRONIC KIDNEY DISEASE, WITHOUT LONG-TERM CURRENT USE OF INSULIN (HCC): ICD-10-CM

## 2018-11-29 DIAGNOSIS — I50.812 CHRONIC RIGHT-SIDED HEART FAILURE (HCC): ICD-10-CM

## 2018-11-29 DIAGNOSIS — E03.9 ACQUIRED HYPOTHYROIDISM: ICD-10-CM

## 2018-11-29 DIAGNOSIS — N18.4 TYPE 2 DIABETES MELLITUS WITH STAGE 4 CHRONIC KIDNEY DISEASE, WITHOUT LONG-TERM CURRENT USE OF INSULIN (HCC): ICD-10-CM

## 2018-11-29 DIAGNOSIS — E66.01 MORBID OBESITY (HCC): ICD-10-CM

## 2018-11-29 PROCEDURE — G0439 PPPS, SUBSEQ VISIT: HCPCS | Performed by: FAMILY MEDICINE

## 2018-11-29 NOTE — PROGRESS NOTES
HPI:   Milo Oropeza is a 75year old male who presents for a Medicare Subsequent Annual Wellness visit (Pt already had Initial Annual Wellness). Patient has no complaints or concerns today. Denies any recent illnesses or hospitalizations.   Refuse CKD (chronic kidney disease), stage IV (HCC)     Right heart failure (HCC)     Panlobular emphysema ([facility] [address]     Uncontrolled type 2 diabetes mellitus with stage 4 chronic kidney disease, without long-term current use of insulin (HCC)     Chest pain, atypica MG) BY MOUTH EVERY NIGHT   losartan 100 MG Oral Tab Take 1 tablet (100 mg total) by mouth daily. Blood Glucose Monitoring Suppl (Meet You CONTOUR NEXT USB MONITOR) w/Device Does not apply Kit 1 Device by Other route 2 (two) times daily. Use as directed.    B Myocardial infarct ([facility] [address] (9/07), Obese, BRITNEY on CPAP, Panic disorder, S/P coronary artery stent placement (9/28/2007), Tobacco abuse, Tracheal deviation (11/6/07), Type II or unspecified type diabetes mellitus without mention of complication, not stated as Estimated body mass index is 39.37 kg/m² as calculated from the following:    Height as of this encounter: 75\". Weight as of this encounter: 315 lb.     Medicare Hearing Assessment  (Required for AWV/SWV)    Hearing Screening    Time taken:  9/30/2017 annual wellness exam  -  Patient refuses colonoscopy  -  Patient refuses pneumonia vaccines  -  Prostate exam done with urology and refuses exam today    Controlled type 2 diabetes mellitus with stage 3 chronic kidney disease, with long-term current use of DONIS POZO, DO, 10/1/2017       General Health     In the past six months, have you lost more than 10 pounds without trying?: 2 - No    Has your appetite been poor?: No    How does the patient maintain a good energy level?: Other    How would you describe y the week is this?: Correct    What month is it?: Correct    What year is it?: Correct    Recall \"Ball\": Correct    Recall \"Flag\": Correct    Recall \"Tree\": Correct       This section provided for quick review of chart, separate sheet to patient  PREV SPECIFIC DISEASE MONITORING Internal Lab or Procedure External Lab or Procedure   Annual Monitoring of Persistent     Medications (ACE/ARB, digoxin diuretics, anticonvulsants.)    Potassium  Annually Potassium (mmol/L)   Date Value   09/26/2018 4.9   0

## 2018-12-07 RX ORDER — CLONAZEPAM 1 MG/1
1 TABLET ORAL 3 TIMES DAILY PRN
Qty: 90 TABLET | Refills: 2 | Status: SHIPPED | OUTPATIENT
Start: 2018-12-07 | End: 2018-12-11 | Stop reason: RX

## 2018-12-11 ENCOUNTER — TELEPHONE (OUTPATIENT)
Dept: FAMILY MEDICINE CLINIC | Facility: CLINIC | Age: 73
End: 2018-12-11

## 2018-12-11 RX ORDER — CLONAZEPAM 2 MG/1
1 TABLET ORAL 3 TIMES DAILY PRN
Qty: 45 TABLET | Refills: 2 | OUTPATIENT
Start: 2018-12-11 | End: 2019-01-28 | Stop reason: DRUGHIGH

## 2018-12-11 NOTE — TELEPHONE ENCOUNTER
Incoming call from Pharmacist([facility]). Clonazepam 1 mg is on back order until the end of December. They have clonazepam 2 mg and clonazepam 0.5 mg tablets. patient will be out of medication in 2 days.   Please advise, thank you

## 2018-12-11 NOTE — TELEPHONE ENCOUNTER
Pt called and he said that pharmacy will be able to cut the pill in half for him in order for him to take the right dose.

## 2018-12-12 NOTE — TELEPHONE ENCOUNTER
Call to taya/pharmacist/scotty-advised of info noted below from dr adler.    Clonazepam 1 mg orders from 12/7/18 adjusted to be 2 mg tablets, take one half tab (1 mg) tid prn anxiety, #45, RFx2  taya voices understanding, no questions, sts they will ins

## 2018-12-20 RX ORDER — LEVOTHYROXINE SODIUM 0.05 MG/1
TABLET ORAL
Qty: 90 TABLET | Refills: 0 | Status: SHIPPED | OUTPATIENT
Start: 2018-12-20 | End: 2019-02-14

## 2018-12-26 RX ORDER — GLIPIZIDE 5 MG/1
TABLET ORAL
Qty: 90 TABLET | Refills: 0 | Status: SHIPPED | OUTPATIENT
Start: 2018-12-26 | End: 2019-03-01

## 2018-12-31 DIAGNOSIS — J43.1 PANLOBULAR EMPHYSEMA (HCC): ICD-10-CM

## 2018-12-31 RX ORDER — BUDESONIDE AND FORMOTEROL FUMARATE DIHYDRATE 160; 4.5 UG/1; UG/1
AEROSOL RESPIRATORY (INHALATION)
Qty: 3 INHALER | Refills: 0 | Status: SHIPPED | OUTPATIENT
Start: 2018-12-31 | End: 2019-01-28

## 2018-12-31 NOTE — TELEPHONE ENCOUNTER
Pt would medication filled today if possible because his INS changes tomorrow. Please advise. Thank you.

## 2019-01-09 ENCOUNTER — TELEPHONE (OUTPATIENT)
Dept: FAMILY MEDICINE CLINIC | Facility: CLINIC | Age: 74
End: 2019-01-09

## 2019-01-09 NOTE — TELEPHONE ENCOUNTER
Patient would like a refill of    METFORMIN HCL 1000 MG Oral Tab    Patient needs a 90 day script sent to [facility] Mail order. Thank you.

## 2019-01-28 DIAGNOSIS — J43.1 PANLOBULAR EMPHYSEMA (HCC): ICD-10-CM

## 2019-01-28 RX ORDER — CLONAZEPAM 1 MG/1
1 TABLET ORAL 3 TIMES DAILY PRN
Qty: 90 TABLET | Refills: 2 | Status: SHIPPED | OUTPATIENT
Start: 2019-01-28 | End: 2019-04-17

## 2019-01-28 RX ORDER — BUDESONIDE AND FORMOTEROL FUMARATE DIHYDRATE 160; 4.5 UG/1; UG/1
AEROSOL RESPIRATORY (INHALATION)
Qty: 1 INHALER | Refills: 0 | Status: SHIPPED | OUTPATIENT
Start: 2019-01-28 | End: 2019-05-29

## 2019-01-28 NOTE — TELEPHONE ENCOUNTER
Pt calling he is getting his meds at [facility] now and he gets ClonazePAM 2 MG Oral Tab but is supposed to take half, old pharmacy cut them for him but [facility] will not. Pt is asking if there is a 1 MG and he needs a refill sent to below.      ClonazePA

## 2019-02-14 DIAGNOSIS — E03.9 ACQUIRED HYPOTHYROIDISM: ICD-10-CM

## 2019-02-14 NOTE — TELEPHONE ENCOUNTER
Pls call   Thank you     Pt due for lab works  Last TSH T4 was 3/2018   Orders still good till 30/2019

## 2019-02-14 NOTE — TELEPHONE ENCOUNTER
Pt would like refill of LEVOTHYROXINE SODIUM 200 MCG Oral Tab and LEVOTHYROXINE SODIUM 50 MCG Oral Tab sent to [facility]. This will be the first time they are filling RX. Please advise. Thank you.

## 2019-02-15 NOTE — TELEPHONE ENCOUNTER
I can call the pt to let him know he needs to complete labs. However please let the front office know if he needs a visit as well. Thank you.

## 2019-02-19 ENCOUNTER — LAB ENCOUNTER (OUTPATIENT)
Dept: LAB | Age: 74
End: 2019-02-19
Attending: FAMILY MEDICINE
Payer: MEDICARE

## 2019-02-19 DIAGNOSIS — N18.4 TYPE 2 DIABETES MELLITUS WITH STAGE 4 CHRONIC KIDNEY DISEASE, WITHOUT LONG-TERM CURRENT USE OF INSULIN (HCC): ICD-10-CM

## 2019-02-19 DIAGNOSIS — E03.9 ACQUIRED HYPOTHYROIDISM: ICD-10-CM

## 2019-02-19 DIAGNOSIS — E11.22 TYPE 2 DIABETES MELLITUS WITH STAGE 4 CHRONIC KIDNEY DISEASE, WITHOUT LONG-TERM CURRENT USE OF INSULIN (HCC): ICD-10-CM

## 2019-02-19 DIAGNOSIS — I10 ESSENTIAL HYPERTENSION WITH GOAL BLOOD PRESSURE LESS THAN 140/90: ICD-10-CM

## 2019-02-19 LAB
ALBUMIN SERPL-MCNC: 3.7 G/DL (ref 3.4–5)
ALBUMIN/GLOB SERPL: 1.2 {RATIO} (ref 1–2)
ALP LIVER SERPL-CCNC: 106 U/L (ref 45–117)
ALT SERPL-CCNC: 33 U/L (ref 16–61)
ANION GAP SERPL CALC-SCNC: 10 MMOL/L (ref 0–18)
AST SERPL-CCNC: 23 U/L (ref 15–37)
BASOPHILS # BLD AUTO: 0.06 X10(3) UL (ref 0–0.2)
BASOPHILS NFR BLD AUTO: 0.6 %
BILIRUB SERPL-MCNC: 0.3 MG/DL (ref 0.1–2)
BUN BLD-MCNC: 41 MG/DL (ref 7–18)
BUN/CREAT SERPL: 16.9 (ref 10–20)
CALCIUM BLD-MCNC: 8.6 MG/DL (ref 8.5–10.1)
CHLORIDE SERPL-SCNC: 110 MMOL/L (ref 98–107)
CHOLEST SMN-MCNC: 144 MG/DL (ref ?–200)
CO2 SERPL-SCNC: 24 MMOL/L (ref 21–32)
CREAT BLD-MCNC: 2.42 MG/DL (ref 0.7–1.3)
DEPRECATED RDW RBC AUTO: 47.4 FL (ref 35.1–46.3)
EOSINOPHIL # BLD AUTO: 0.62 X10(3) UL (ref 0–0.7)
EOSINOPHIL NFR BLD AUTO: 6.7 %
ERYTHROCYTE [DISTWIDTH] IN BLOOD BY AUTOMATED COUNT: 13.6 % (ref 11–15)
EST. AVERAGE GLUCOSE BLD GHB EST-MCNC: 166 MG/DL (ref 68–126)
GLOBULIN PLAS-MCNC: 3.2 G/DL (ref 2.8–4.4)
GLUCOSE BLD-MCNC: 117 MG/DL (ref 70–99)
HBA1C MFR BLD HPLC: 7.4 % (ref ?–5.7)
HCT VFR BLD AUTO: 37.8 % (ref 39–53)
HDLC SERPL-MCNC: 31 MG/DL (ref 40–59)
HGB BLD-MCNC: 12.6 G/DL (ref 13–17.5)
IMM GRANULOCYTES # BLD AUTO: 0.08 X10(3) UL (ref 0–1)
IMM GRANULOCYTES NFR BLD: 0.9 %
LDLC SERPL CALC-MCNC: 67 MG/DL (ref ?–100)
LYMPHOCYTES # BLD AUTO: 2.19 X10(3) UL (ref 1–4)
LYMPHOCYTES NFR BLD AUTO: 23.7 %
M PROTEIN MFR SERPL ELPH: 6.9 G/DL (ref 6.4–8.2)
MCH RBC QN AUTO: 31.6 PG (ref 26–34)
MCHC RBC AUTO-ENTMCNC: 33.3 G/DL (ref 31–37)
MCV RBC AUTO: 94.7 FL (ref 80–100)
MONOCYTES # BLD AUTO: 0.88 X10(3) UL (ref 0.1–1)
MONOCYTES NFR BLD AUTO: 9.5 %
NEUTROPHILS # BLD AUTO: 5.41 X10 (3) UL (ref 1.5–7.7)
NEUTROPHILS # BLD AUTO: 5.41 X10(3) UL (ref 1.5–7.7)
NEUTROPHILS NFR BLD AUTO: 58.6 %
NONHDLC SERPL-MCNC: 113 MG/DL (ref ?–130)
OSMOLALITY SERPL CALC.SUM OF ELEC: 309 MOSM/KG (ref 275–295)
PLATELET # BLD AUTO: 179 10(3)UL (ref 150–450)
POTASSIUM SERPL-SCNC: 5.3 MMOL/L (ref 3.5–5.1)
RBC # BLD AUTO: 3.99 X10(6)UL (ref 3.8–5.8)
SODIUM SERPL-SCNC: 144 MMOL/L (ref 136–145)
T4 FREE SERPL-MCNC: 1.3 NG/DL (ref 0.8–1.7)
TRIGL SERPL-MCNC: 228 MG/DL (ref 30–149)
TSI SER-ACNC: 0.4 MIU/ML (ref 0.36–3.74)
VLDLC SERPL CALC-MCNC: 46 MG/DL (ref 0–30)
WBC # BLD AUTO: 9.2 X10(3) UL (ref 4–11)

## 2019-02-19 PROCEDURE — 36415 COLL VENOUS BLD VENIPUNCTURE: CPT

## 2019-02-19 PROCEDURE — 83036 HEMOGLOBIN GLYCOSYLATED A1C: CPT

## 2019-02-19 PROCEDURE — 85025 COMPLETE CBC W/AUTO DIFF WBC: CPT

## 2019-02-19 PROCEDURE — 80053 COMPREHEN METABOLIC PANEL: CPT

## 2019-02-19 PROCEDURE — 84439 ASSAY OF FREE THYROXINE: CPT

## 2019-02-19 PROCEDURE — 84443 ASSAY THYROID STIM HORMONE: CPT

## 2019-02-19 PROCEDURE — 80061 LIPID PANEL: CPT

## 2019-02-20 DIAGNOSIS — E87.5 SERUM POTASSIUM ELEVATED: Primary | ICD-10-CM

## 2019-02-20 RX ORDER — LEVOTHYROXINE SODIUM 0.05 MG/1
50 TABLET ORAL
Qty: 90 TABLET | Refills: 0 | Status: SHIPPED | OUTPATIENT
Start: 2019-02-20 | End: 2019-02-22

## 2019-02-20 RX ORDER — LEVOTHYROXINE SODIUM 0.2 MG/1
TABLET ORAL
Qty: 90 TABLET | Refills: 0 | Status: SHIPPED | OUTPATIENT
Start: 2019-02-20 | End: 2019-05-19

## 2019-02-22 ENCOUNTER — TELEPHONE (OUTPATIENT)
Dept: FAMILY MEDICINE CLINIC | Facility: CLINIC | Age: 74
End: 2019-02-22

## 2019-02-22 RX ORDER — LEVOTHYROXINE SODIUM 0.05 MG/1
50 TABLET ORAL
Qty: 90 TABLET | Refills: 3 | Status: SHIPPED | OUTPATIENT
Start: 2019-02-22 | End: 2019-06-24

## 2019-02-22 NOTE — TELEPHONE ENCOUNTER
Patient states that his medication went to local pharmacy and should be sent to his mail order.     Needs Levothyroxine Sodium 200 MCG Oral Tab and Levothyroxine Sodium 50 MCG Oral Tab sent to [facility] [id number], [address] [facility]

## 2019-03-01 RX ORDER — FINASTERIDE 5 MG/1
TABLET, FILM COATED ORAL
Qty: 90 TABLET | Refills: 1 | Status: SHIPPED | OUTPATIENT
Start: 2019-03-01 | End: 2019-09-09

## 2019-03-01 RX ORDER — GLIPIZIDE 5 MG/1
TABLET ORAL
Qty: 90 TABLET | Refills: 0 | Status: SHIPPED | OUTPATIENT
Start: 2019-03-01 | End: 2019-05-06 | Stop reason: DRUGHIGH

## 2019-03-01 NOTE — TELEPHONE ENCOUNTER
Pt's Daughter in law Tanisha Murillo, called back stating pt has to use [facility] mail[facility] now and needs Finasteride 5mg and Glipizide 5 mg refilled. Pt has appt 3/6/19.

## 2019-03-01 NOTE — TELEPHONE ENCOUNTER
Called pt back to verify refill request, pt states his daughter in law Severiano Ching is handling his meds now. Rimma Stephen will call us back and verify med request to [facility] refills.

## 2019-03-01 NOTE — TELEPHONE ENCOUNTER
Pt would like refill of finasteride 5 MG Oral Tab, and GLIPIZIDE 5 MG Oral Tab sen to the mail order pharmacy on file. Please advise. Thank you.

## 2019-03-06 ENCOUNTER — TELEPHONE (OUTPATIENT)
Dept: FAMILY MEDICINE CLINIC | Facility: CLINIC | Age: 74
End: 2019-03-06

## 2019-03-06 ENCOUNTER — APPOINTMENT (OUTPATIENT)
Dept: LAB | Age: 74
End: 2019-03-06
Attending: FAMILY MEDICINE
Payer: MEDICARE

## 2019-03-06 ENCOUNTER — HOSPITAL ENCOUNTER (OUTPATIENT)
Dept: GENERAL RADIOLOGY | Age: 74
Discharge: HOME | End: 2019-03-06
Attending: FAMILY MEDICINE
Payer: MEDICARE

## 2019-03-06 ENCOUNTER — OFFICE VISIT (OUTPATIENT)
Dept: FAMILY MEDICINE CLINIC | Facility: CLINIC | Age: 74
End: 2019-03-06
Payer: MEDICARE

## 2019-03-06 VITALS
SYSTOLIC BLOOD PRESSURE: 110 MMHG | WEIGHT: 315 LBS | TEMPERATURE: 98 F | RESPIRATION RATE: 16 BRPM | DIASTOLIC BLOOD PRESSURE: 60 MMHG | BODY MASS INDEX: 42 KG/M2 | HEART RATE: 60 BPM

## 2019-03-06 DIAGNOSIS — N18.4 TYPE 2 DIABETES MELLITUS WITH STAGE 4 CHRONIC KIDNEY DISEASE, WITHOUT LONG-TERM CURRENT USE OF INSULIN (HCC): ICD-10-CM

## 2019-03-06 DIAGNOSIS — L03.116 LEFT LEG CELLULITIS: Primary | ICD-10-CM

## 2019-03-06 DIAGNOSIS — E87.5 SERUM POTASSIUM ELEVATED: ICD-10-CM

## 2019-03-06 DIAGNOSIS — I10 ESSENTIAL HYPERTENSION WITH GOAL BLOOD PRESSURE LESS THAN 140/90: ICD-10-CM

## 2019-03-06 DIAGNOSIS — E11.22 TYPE 2 DIABETES MELLITUS WITH STAGE 4 CHRONIC KIDNEY DISEASE, WITHOUT LONG-TERM CURRENT USE OF INSULIN (HCC): ICD-10-CM

## 2019-03-06 DIAGNOSIS — I50.812 CHRONIC RIGHT-SIDED HEART FAILURE (HCC): ICD-10-CM

## 2019-03-06 DIAGNOSIS — J43.1 PANLOBULAR EMPHYSEMA (HCC): ICD-10-CM

## 2019-03-06 LAB
ANION GAP SERPL CALC-SCNC: 9 MMOL/L (ref 0–18)
BUN BLD-MCNC: 41 MG/DL (ref 7–18)
BUN/CREAT SERPL: 16.6 (ref 10–20)
CALCIUM BLD-MCNC: 8.9 MG/DL (ref 8.5–10.1)
CHLORIDE SERPL-SCNC: 104 MMOL/L (ref 98–107)
CO2 SERPL-SCNC: 26 MMOL/L (ref 21–32)
CREAT BLD-MCNC: 2.47 MG/DL (ref 0.7–1.3)
GLUCOSE BLD-MCNC: 151 MG/DL (ref 70–99)
OSMOLALITY SERPL CALC.SUM OF ELEC: 301 MOSM/KG (ref 275–295)
POTASSIUM SERPL-SCNC: 4.7 MMOL/L (ref 3.5–5.1)
SODIUM SERPL-SCNC: 139 MMOL/L (ref 136–145)

## 2019-03-06 PROCEDURE — 36415 COLL VENOUS BLD VENIPUNCTURE: CPT

## 2019-03-06 PROCEDURE — 99215 OFFICE O/P EST HI 40 MIN: CPT | Performed by: FAMILY MEDICINE

## 2019-03-06 PROCEDURE — 82570 ASSAY OF URINE CREATININE: CPT

## 2019-03-06 PROCEDURE — 80048 BASIC METABOLIC PNL TOTAL CA: CPT

## 2019-03-06 PROCEDURE — 71046 X-RAY EXAM CHEST 2 VIEWS: CPT | Performed by: FAMILY MEDICINE

## 2019-03-06 PROCEDURE — 82043 UR ALBUMIN QUANTITATIVE: CPT

## 2019-03-06 RX ORDER — CLINDAMYCIN HYDROCHLORIDE 150 MG/1
450 CAPSULE ORAL 3 TIMES DAILY
Qty: 90 CAPSULE | Refills: 0 | Status: SHIPPED | OUTPATIENT
Start: 2019-03-06 | End: 2019-03-16

## 2019-03-06 NOTE — PROGRESS NOTES
[facility] Family Medicine Office Note  Chief Complaint:   Patient presents with:  Test Results      HPI:   This is a 75year old male coming in for DM2, HTN, COPD, CHF and left leg rash.     1.  DM2 - The patient presents for recheck of his diabe (chronic obstructive pulmonary disease) (HCC)     no O2   • Coronary atherosclerosis of unspecified type of vessel, native or graft 3/26/2012   • Coronary disease     s/p LAD stent   • Depression    • Dysuria 11/5/07    w/burning   • Echocardiogram abnorma angiogram   • CATH BARE METAL STENT (BMS)     • HEMORRHOIDECTOMY  9/6/07   • OTHER      AAA repair   • OTHER SURGICAL HISTORY  9/07    patent right coronary artery stent   • OTHER SURGICAL HISTORY      rt cataract   • ROBOT-ASSISTED LAPAROSCOPIC VENTRAL HE Inhaler Rfl: 0   ClonazePAM 1 MG Oral Tab Take 1 tablet (1 mg total) by mouth 3 (three) times daily as needed for Anxiety.  Disp: 90 tablet Rfl: 2   MetFORMIN HCl 1000 MG Oral Tab TAKE ONE TABLET BY MOUTH TWICE DAILY WITH MEALS Disp: 180 tablet Rfl: 0   Annika every 6 (six) hours as needed for Wheezing.  Disp: 1 Inhaler Rfl: 3      Ready to quit: No  Counseling given: Not Answered  Comment: has cut down to 1 pack every 3 days        REVIEW OF SYSTEMS:   ROS:  CONSTITUTIONAL:  Denies any unusual weight gain/loss, Start clindamycin 450mg every 8 hours x 10 days  -  Warm compresses  -  Wound care ordered  -  Go to ER if erythema worsens    2.  Uncontrolled type 2 diabetes mellitus with stage 4 chronic kidney disease, without long-term current use of insulin (HCC)  - questions, complications, allergies, or worsening or changing symptoms. Patient is to call with any side effects or complications from the treatments as a result of today.      Problem List:  Patient Active Problem List:     Malignant neoplasm of prostate [facility])     Ventricular tachyarrhythmia ([facility] [address]      TRISTAN POZO DO  5/9/2018  10:59 AM

## 2019-03-06 NOTE — TELEPHONE ENCOUNTER
Pt's son Carolann Cash) called. He's not on verbal hippaa. Giovani Brothers states there should be a prescription order for a pain pill for his dad's back pain. Please call pt back at [phone] Suki Mcclellan) to talk about the pain pill that he needs to be prescribed.

## 2019-03-06 NOTE — TELEPHONE ENCOUNTER
Patients wife called regarding Muna Dang Appointment today at 245pm, she is unable to be here, her son is bringing him. She called because she has some concerns.     Sore on his left leg    Complains of back pain    She would like home health care 1x a we

## 2019-03-06 NOTE — TELEPHONE ENCOUNTER
[facility] RN lm for pt to cb as he has test results. See results when he calls back.

## 2019-03-07 ENCOUNTER — TELEPHONE (OUTPATIENT)
Dept: FAMILY MEDICINE CLINIC | Facility: CLINIC | Age: 74
End: 2019-03-07

## 2019-03-07 LAB
CREAT UR-SCNC: 60.3 MG/DL
MICROALBUMIN UR-MCNC: 0.63 MG/DL
MICROALBUMIN/CREAT 24H UR-RTO: 10.4 UG/MG (ref ?–30)

## 2019-03-07 RX ORDER — TRAMADOL HYDROCHLORIDE 50 MG/1
50 TABLET ORAL EVERY 6 HOURS PRN
Qty: 60 TABLET | Refills: 0 | Status: SHIPPED | OUTPATIENT
Start: 2019-03-07 | End: 2019-05-08

## 2019-03-07 NOTE — TELEPHONE ENCOUNTER
Pt called back regarding results. Reports at his vs he told you about pain in lower back for a few mos and that you were gonna give him a medication for that? Has been using Tylenol only, not effective.      Also asking about wound care, says he needs jill

## 2019-03-07 NOTE — TELEPHONE ENCOUNTER
[address] [facility],[address] for a Nurse visit for wound evaluation and treatment. Order, along with demographics and insurance info faxed to [phone].   Spoke with Libra Cordova and she states once they receive the above, an RN will go to pt's home for

## 2019-03-18 ENCOUNTER — OFFICE VISIT (OUTPATIENT)
Dept: FAMILY MEDICINE CLINIC | Facility: CLINIC | Age: 74
End: 2019-03-18
Payer: MEDICARE

## 2019-03-18 ENCOUNTER — TELEPHONE (OUTPATIENT)
Dept: FAMILY MEDICINE CLINIC | Facility: CLINIC | Age: 74
End: 2019-03-18

## 2019-03-18 VITALS
DIASTOLIC BLOOD PRESSURE: 68 MMHG | TEMPERATURE: 97 F | WEIGHT: 315 LBS | SYSTOLIC BLOOD PRESSURE: 118 MMHG | HEART RATE: 88 BPM | BODY MASS INDEX: 39.17 KG/M2 | HEIGHT: 75 IN

## 2019-03-18 DIAGNOSIS — L03.116 LEFT LEG CELLULITIS: Primary | ICD-10-CM

## 2019-03-18 PROCEDURE — 99213 OFFICE O/P EST LOW 20 MIN: CPT | Performed by: FAMILY MEDICINE

## 2019-03-18 NOTE — TELEPHONE ENCOUNTER
Call to [place] RN/[facility]idential home [facility] wound care nurse-advised dr adler approves change in wound care orders as requested. [place] voices understanding, no other questions.

## 2019-03-18 NOTE — PROGRESS NOTES
[facility] Family Medicine Office Note  Chief Complaint:   Patient presents with:  Rash Skin Problem (integumentary): f/u cellulitis left lower leg      HPI:   This is a 75year old male coming in for follow-up of left leg cellulitis.   Patient wa 11/09/2007   • Malignant neoplasm of prostate ([facility] [address] 3/26/2012    pt does not want radiation   • Metabolic syndrome    • Myocardial infarct ([facility] [address] 9/07    w/3 vessel coronary artery disease, s/ stent x3 and RCA.    • Obese    • BRITNEY on CPAP    • Panic disorder Heart Attack Father    • Other (bone cancer) Mother      Allergies: Ace Inhibitors          Coughing  Current Meds:    Current Outpatient Medications:  tamsulosin HCl 0.4 MG Oral Cap Take 1 capsule (0.4 mg total) by mouth 2 (two) times daily.  Disp: 180 daily. TAKE TWO TABLETS BY MOUTH ONCE DAILY ) Disp: 180 tablet Rfl: 0   torsemide 20 MG Oral Tab Take 3 tablets daily. total 60 mg (Patient taking differently: Take 20 mg by mouth 2 (two) times daily. Take 3 tablets daily. total 60 mg ) Disp: 270 tablet Rfl: extremities.   MUSCULOSKELETAL:  + left leg pain    EXAM:   /68 (BP Location: Left arm, Patient Position: Sitting, Cuff Size: large)   Pulse 88   Temp 97 °F (36.1 °C)   Ht 75\"   Wt (!) 348 lb   BMI 43.50 kg/m²  Estimated body mass index is 43.5 kg/m² today.     Problem List:  Patient Active Problem List:     Malignant neoplasm of prostate ([facility] [address]     Essential hypertension, benign     DM type 2 (diabetes mellitus, type 2) (HCC)     Coronary atherosclerosis     BRITNEY (obstructive sleep apnea)     Myocardial

## 2019-03-18 NOTE — TELEPHONE ENCOUNTER
Call from [place]/wound care RN/[facility]-requesting change in wound care orders as follows for pt's left lateral shin wound:  \"requesting change to silver alginate, abd pad, kerlix and tubigrips to left lateral shin wound 3x/wk. \"  Advised w

## 2019-03-19 ENCOUNTER — TELEPHONE (OUTPATIENT)
Dept: FAMILY MEDICINE CLINIC | Facility: CLINIC | Age: 74
End: 2019-03-19

## 2019-03-19 NOTE — TELEPHONE ENCOUNTER
Noted. But can not discuss pt's medical info w daughter that is not on hipaa consent and can not accept wife's permission to do this.  Permission needs to come from pt.  hipaa consent has ramone/spouse listed but all other info in record lists dawn/spouse

## 2019-03-19 NOTE — TELEPHONE ENCOUNTER
Patients wife calling in regarding patients leg after apt yesterday. She is very worried about him. Said that he came home from apt and told her that Dr. Janelle Butt said everything is fine.  She said it is so swollen and red, he can barely walk and she needs to k

## 2019-03-27 ENCOUNTER — TELEPHONE (OUTPATIENT)
Dept: FAMILY MEDICINE CLINIC | Facility: CLINIC | Age: 74
End: 2019-03-27

## 2019-03-27 NOTE — TELEPHONE ENCOUNTER
Call from [facility] PT/residential home health-\"notifying dr adler that pt is now refusing physical therapy. I am familiar w pt-saw him last year for PT. Feel he would benefit from therapy but pt sts due to leg cellulitis/pain does not want PT right now.  He did

## 2019-03-28 ENCOUNTER — TELEPHONE (OUTPATIENT)
Dept: FAMILY MEDICINE CLINIC | Facility: CLINIC | Age: 74
End: 2019-03-28

## 2019-03-28 NOTE — TELEPHONE ENCOUNTER
Pt called. He wants Dr Avery De La Paz nurse to call him back to talk about him coming in regarding his cellulitis. Please call him back.

## 2019-03-28 NOTE — TELEPHONE ENCOUNTER
Pt states the wound clinic called to set up appt but pt states he can not go to the wound clinic as it will cost him too much in cab fare. Message is FYI.

## 2019-04-03 ENCOUNTER — TELEPHONE (OUTPATIENT)
Dept: FAMILY MEDICINE CLINIC | Facility: CLINIC | Age: 74
End: 2019-04-03

## 2019-04-05 NOTE — TELEPHONE ENCOUNTER
Spoke to pt wife, Jg Talbot (Gabriele Ontiveros per MARK) who stated pt has not had eye exam yet but she will remind him to call to schedule.

## 2019-04-17 ENCOUNTER — OFFICE VISIT (OUTPATIENT)
Dept: FAMILY MEDICINE CLINIC | Facility: CLINIC | Age: 74
End: 2019-04-17
Payer: MEDICARE

## 2019-04-17 VITALS
TEMPERATURE: 99 F | SYSTOLIC BLOOD PRESSURE: 130 MMHG | HEART RATE: 100 BPM | WEIGHT: 315 LBS | DIASTOLIC BLOOD PRESSURE: 78 MMHG | HEIGHT: 75 IN | BODY MASS INDEX: 39.17 KG/M2 | RESPIRATION RATE: 18 BRPM | OXYGEN SATURATION: 95 %

## 2019-04-17 DIAGNOSIS — J01.01 ACUTE RECURRENT MAXILLARY SINUSITIS: Primary | ICD-10-CM

## 2019-04-17 PROCEDURE — 99214 OFFICE O/P EST MOD 30 MIN: CPT | Performed by: FAMILY MEDICINE

## 2019-04-17 RX ORDER — AMOXICILLIN AND CLAVULANATE POTASSIUM 875; 125 MG/1; MG/1
1 TABLET, FILM COATED ORAL 2 TIMES DAILY
Qty: 20 TABLET | Refills: 0 | Status: SHIPPED | OUTPATIENT
Start: 2019-04-17 | End: 2019-04-27

## 2019-04-17 RX ORDER — CLONAZEPAM 1 MG/1
1 TABLET ORAL 3 TIMES DAILY PRN
Qty: 90 TABLET | Refills: 2 | Status: SHIPPED | OUTPATIENT
Start: 2019-04-17 | End: 2019-07-12

## 2019-04-17 NOTE — PROGRESS NOTES
HPI:   Navya Hyatt is a 75year old male who presents for upper respiratory symptoms for  3  weeks.  Patient reports congestion, yellow colored nasal discharge, cough with yellow colored sputum, cough is keeping pt up at night, sinus pain, OTC cold med daily. Disp: 90 tablet Rfl: 0   Metoprolol Succinate ER 50 MG Oral Tablet 24 Hr Take 1 tablet (50 mg total) by mouth once daily.  Disp: 90 tablet Rfl: 0   Potassium Chloride ER (KLOR-CON M20) 20 MEQ Oral Tab CR TAKE TWO TABLETS BY MOUTH ONCE DAILY (Patient impaction 5/26/05    right   • Congestive heart disease ([facility] [address]     not sure when last episode was- many years ago   • COPD (chronic obstructive pulmonary disease) (HCC)     no O2   • Coronary atherosclerosis of unspecified type of vessel, native or graft 3/ • CARDIAC CATHERTERIZATION ([facility])  09/28/2007    left heart cath,liclat selective coronary angiogram,left iliofemoral angiogram   • CATH BARE METAL STENT ([facility]MS)     • HEMORRHOIDECTOMY  9/6/07   • OTHER      AAA repair   • OTHER SURGICAL HISTORY  9/07    pa Madeline Andrea is a 75year old male who presents with Sinusitis. PLAN: Augmentin 875/125mg BID x 10 days. Saline Rinse. Tylenol or motrin as needed. Antihistamine prn. Fluids. Probiotics advised. Take abx with food. Side effects discussed.   The patient i

## 2019-04-24 ENCOUNTER — TELEPHONE (OUTPATIENT)
Dept: FAMILY MEDICINE CLINIC | Facility: CLINIC | Age: 74
End: 2019-04-24

## 2019-04-24 NOTE — TELEPHONE ENCOUNTER
Juliette MIN res home health  Requesting verbal order cubigrip compression stockings for pt. She will measure and order for pt if you agree. I have given verbal [id number] for this as she is with pt now and can measure him. Sending as Keron Zuñiga.   She will send narda

## 2019-04-29 ENCOUNTER — PATIENT OUTREACH (OUTPATIENT)
Dept: FAMILY MEDICINE CLINIC | Facility: CLINIC | Age: 74
End: 2019-04-29

## 2019-04-29 NOTE — PROGRESS NOTES
Please call pt to inquire if pt has had a colonoscopy in the last 10 years and if so who performed it (name and phone #). Please let Isra Cousins know so I can obtain the report.     If pt did not have a colonoscopy please advise them we will leave the FIT stoo

## 2019-05-06 ENCOUNTER — TELEPHONE (OUTPATIENT)
Dept: FAMILY MEDICINE CLINIC | Facility: CLINIC | Age: 74
End: 2019-05-06

## 2019-05-06 RX ORDER — GLIPIZIDE 10 MG/1
10 TABLET ORAL DAILY
Qty: 90 TABLET | Refills: 0 | Status: SHIPPED | OUTPATIENT
Start: 2019-05-06 | End: 2019-08-06

## 2019-05-06 NOTE — TELEPHONE ENCOUNTER
glipiZIDE 5 MG      Daughter said the dosage was increased so he is out and the pharmacy told them it is too soon to refill. Please send to [facility].

## 2019-05-06 NOTE — TELEPHONE ENCOUNTER
Per ov notes 3/6  2.  Uncontrolled type 2 diabetes mellitus with stage 4 chronic kidney disease, without long-term current use of insulin (HCC)  -  A1c is improving but still not at goal of < 7  -  Ok to continue metformin but may need to hold medication if

## 2019-05-08 ENCOUNTER — TELEPHONE (OUTPATIENT)
Dept: FAMILY MEDICINE CLINIC | Facility: CLINIC | Age: 74
End: 2019-05-08

## 2019-05-08 NOTE — TELEPHONE ENCOUNTER
Call from [facility] RN/residential home health-requesting order to recertify pt for SN home health services 1x/wk x 60 days. sts pt had recent URI and has persistent bilateral LE edema.  SN will continue to monitor cardiopulmonary status and continue pt and

## 2019-05-10 ENCOUNTER — TELEPHONE (OUTPATIENT)
Dept: FAMILY MEDICINE CLINIC | Facility: CLINIC | Age: 74
End: 2019-05-10

## 2019-05-10 NOTE — TELEPHONE ENCOUNTER
Saqib Ho RN from [facility] calling in regards to pt. She said PT was recommended previously for pt but he had declined d/t not personally liking the PT he had.   She said she s/w him today and he said he is willing to try PT again but with a different

## 2019-05-19 DIAGNOSIS — E03.9 ACQUIRED HYPOTHYROIDISM: ICD-10-CM

## 2019-05-20 RX ORDER — LEVOTHYROXINE SODIUM 0.2 MG/1
TABLET ORAL
Qty: 90 TABLET | Refills: 0 | Status: SHIPPED | OUTPATIENT
Start: 2019-05-20 | End: 2019-05-22

## 2019-05-20 RX ORDER — LEVOTHYROXINE SODIUM 0.05 MG/1
TABLET ORAL
Qty: 90 TABLET | Refills: 0 | Status: SHIPPED | OUTPATIENT
Start: 2019-05-20 | End: 2019-05-22

## 2019-05-22 ENCOUNTER — TELEPHONE (OUTPATIENT)
Dept: FAMILY MEDICINE CLINIC | Facility: CLINIC | Age: 74
End: 2019-05-22

## 2019-05-22 DIAGNOSIS — E03.9 ACQUIRED HYPOTHYROIDISM: ICD-10-CM

## 2019-05-22 RX ORDER — LEVOTHYROXINE SODIUM 0.05 MG/1
50 TABLET ORAL
Qty: 90 TABLET | Refills: 0 | Status: SHIPPED | OUTPATIENT
Start: 2019-05-22 | End: 2020-03-10

## 2019-05-22 RX ORDER — LEVOTHYROXINE SODIUM 0.2 MG/1
TABLET ORAL
Qty: 90 TABLET | Refills: 0 | Status: SHIPPED | OUTPATIENT
Start: 2019-05-22 | End: 2019-09-09

## 2019-05-22 NOTE — TELEPHONE ENCOUNTER
Daughter-in-law asking for refill of Levothyroxine Sodium 200 MCG Oral [place] to [facility].

## 2019-06-03 ENCOUNTER — TELEPHONE (OUTPATIENT)
Dept: FAMILY MEDICINE CLINIC | Facility: CLINIC | Age: 74
End: 2019-06-03

## 2019-06-03 NOTE — TELEPHONE ENCOUNTER
[facility] PT res home health calling to request extension of pt's home PT. Asking for 2x a week x 3 weeks. I have provided verbal order. She will send fax to sign. Sending as isaac.

## 2019-06-17 ENCOUNTER — MED REC SCAN ONLY (OUTPATIENT)
Dept: FAMILY MEDICINE CLINIC | Facility: CLINIC | Age: 74
End: 2019-06-17

## 2019-06-17 NOTE — TELEPHONE ENCOUNTER
Kacey Fung called. She states she needs a refill for GABAPENTIN 300 MG Oral Cap to please be refilled at [address] [facility], [address] [place] [address],  [phone], [phone].   This is the first time this medication is going to this ph

## 2019-06-18 NOTE — TELEPHONE ENCOUNTER
Last OV : 4/17/2019 Sick visit 11/29/18 [id number]  Upcoming appt/reason:    Future Appointments   Date Time Provider Gabriele Muñoz   9/4/2019  1:00 PM YANIRA Brantley [address] [place]   11/12/2019  2:00 PM Laura Rajput MD [facility] [place] [address]

## 2019-06-19 RX ORDER — GABAPENTIN 300 MG/1
CAPSULE ORAL
Qty: 270 CAPSULE | Refills: 1 | Status: SHIPPED | OUTPATIENT
Start: 2019-06-19 | End: 2019-06-24 | Stop reason: DRUGHIGH

## 2019-06-24 NOTE — PROGRESS NOTES
[facility] Family Medicine Office Note  Chief Complaint:   Patient presents with:  Medication Follow-Up: gabapentin      HPI:   This is a 75year old male coming in for anxiety and peripheral neuropathy. Patient has long-standing anxiety.   He is ventricular compliance   • Elevated lipids    • Elevated PSA    • Enlarged thyroid    • Essential hypertension    • Essential hypertension, benign 3/26/2012   • Gallstone 11/08/07    on ct small stone   • Heart attack [facility])    • Hemorrhoids    • High blood MD at [address] [place] [address] MAIN OR     Social History:  Social History    Tobacco Use      Smoking status: Current Every Day Smoker        Packs/day: 0.25        Years: 45.00        Pack years: 11.25        Types: Cigarettes      Smokeless tobacco: Never Used      Tobacco c 100 MG Oral Tab TAKE 1 TABLET DAILY Disp: 90 tablet Rfl: 0   clonazePAM 1 MG Oral Tab Take 1 tablet (1 mg total) by mouth 3 (three) times daily as needed for Anxiety.  Disp: 90 tablet Rfl: 2   metFORMIN HCl 1000 MG Oral Tab TAKE 1 TABLET TWICE DAILY  WITH M 16   Ht 75\"   Wt (!) 330 lb   BMI 41.25 kg/m²  Estimated body mass index is 41.25 kg/m² as calculated from the following:    Height as of this encounter: 75\". Weight as of this encounter: 330 lb. Vital signs reviewed. Appears stated age, well groomed understanding. Patient is notified to call with any questions, complications, allergies, or worsening or changing symptoms. Patient is to call with any side effects or complications from the treatments as a result of today.      Problem List:  Patient Acti infarction) ([address]     DCM (dilated cardiomyopathy) ([address]     Ventricular tachyarrhythmia ([address]     Hypoxia     Influenza      TRISTAN POZO, DO

## 2019-06-27 ENCOUNTER — TELEPHONE (OUTPATIENT)
Dept: FAMILY MEDICINE CLINIC | Facility: CLINIC | Age: 74
End: 2019-06-27

## 2019-06-27 NOTE — TELEPHONE ENCOUNTER
Lindsay Yang daughter n law called  Reports ever since Dr Fuentes Ates increased pt's gabapentin 3 days ago he has developed diarrhea and stomach cramps. No fever. No vomiting.   When asking if any improvement of sx's for the gabapentin (periph neuropathy) she states h

## 2019-06-27 NOTE — TELEPHONE ENCOUNTER
Go back to original dose to make sure symptoms resolve then update. If symptoms resolve with decrease, will plan to take dose up more slowly. Update tomorrow.   If symptoms worsen or if develops any fever, rectal bleeding, localizing abdominal pain today o

## 2019-06-27 NOTE — TELEPHONE ENCOUNTER
1. What are your symptoms?  abd pain diarrhea      2. How long have you been having these symptoms? Since dose change in gabapentin 3 days ago    3. Have you done anything already to treat your symptoms?          ADDITIONAL INFO:

## 2019-07-02 ENCOUNTER — TELEPHONE (OUTPATIENT)
Dept: FAMILY MEDICINE CLINIC | Facility: CLINIC | Age: 74
End: 2019-07-02

## 2019-07-02 NOTE — TELEPHONE ENCOUNTER
Information and recommendations given to [facility], voiced understanding, agreed with plan

## 2019-07-02 NOTE — TELEPHONE ENCOUNTER
I spoke with Aminah Gan, unfortunately the 300 mg are capsules not tablets, the the 600 mg are tablets, hesitant to buy more because, she just purchased this prescription.     Please advise, thank you

## 2019-07-02 NOTE — TELEPHONE ENCOUNTER
Daughter Zeb Sands reporting that patient was on increased Gabapentin dose of 600 mg BID, and this caused stomach cramping and diarrhea.  Patient reports he did not have any benefit of increased dose, but he was only on this dose for 2 days (not enough time to

## 2019-07-05 ENCOUNTER — MED REC SCAN ONLY (OUTPATIENT)
Dept: FAMILY MEDICINE CLINIC | Facility: CLINIC | Age: 74
End: 2019-07-05

## 2019-07-12 ENCOUNTER — TELEPHONE (OUTPATIENT)
Dept: FAMILY MEDICINE CLINIC | Facility: CLINIC | Age: 74
End: 2019-07-12

## 2019-07-12 NOTE — TELEPHONE ENCOUNTER
Last seen x 2 weeks ago for Anxiety follow up. Last refilled 4/17/2019 #90 with 2 refills. Pt takes 1 tablet by mouth 3 times daily PRN.

## 2019-07-12 NOTE — TELEPHONE ENCOUNTER
Ida PT from [facility] called. Advising plan of care is to continue PT 2x a wk x 4 wks  They will monitor o2 sats and call if less than 90. They will send orders to sign.

## 2019-07-14 RX ORDER — CLONAZEPAM 1 MG/1
TABLET ORAL
Qty: 90 TABLET | Refills: 2 | Status: SHIPPED | OUTPATIENT
Start: 2019-07-14 | End: 2019-10-02 | Stop reason: DRUGHIGH

## 2019-07-29 ENCOUNTER — TELEPHONE (OUTPATIENT)
Dept: FAMILY MEDICINE CLINIC | Facility: CLINIC | Age: 74
End: 2019-07-29

## 2019-07-29 NOTE — TELEPHONE ENCOUNTER
FILI  Incoming call from [facility] PT (residential home health), request order for continuation of in home PT twice weekly for an additional 3 weeks for Strength and balance. Verbal given. [facility] will fax order for signature.   [facility] contact number: [phone]

## 2019-08-01 ENCOUNTER — TELEPHONE (OUTPATIENT)
Dept: FAMILY MEDICINE CLINIC | Facility: CLINIC | Age: 74
End: 2019-08-01

## 2019-08-01 NOTE — TELEPHONE ENCOUNTER
PT Rosalio MUÑOZ    Seen and evaluated the pt     Frequency and duration for strengthening   1X for 5 weeks then will be d/c    [facility]

## 2019-08-05 ENCOUNTER — MED REC SCAN ONLY (OUTPATIENT)
Dept: FAMILY MEDICINE CLINIC | Facility: CLINIC | Age: 74
End: 2019-08-05

## 2019-08-07 RX ORDER — GLIPIZIDE 10 MG/1
TABLET ORAL
Qty: 90 TABLET | Refills: 0 | Status: SHIPPED | OUTPATIENT
Start: 2019-08-07 | End: 2019-11-12

## 2019-08-21 ENCOUNTER — TELEPHONE (OUTPATIENT)
Dept: FAMILY MEDICINE CLINIC | Facility: CLINIC | Age: 74
End: 2019-08-21

## 2019-08-21 NOTE — TELEPHONE ENCOUNTER
Yancy Lees RN from [facility] health called. Asking to re-cert pt for skilled nursing. Needs verbal order. I have provided. She will be faxing over orders/summary of care. Sending as Zoraida Gray.

## 2019-08-28 ENCOUNTER — MED REC SCAN ONLY (OUTPATIENT)
Dept: FAMILY MEDICINE CLINIC | Facility: CLINIC | Age: 74
End: 2019-08-28

## 2019-08-28 NOTE — PROGRESS NOTES
LM on VM regarding AMI call back, and asked patient to return call for further discussion. SUBJECTIVE:  Patient is a 50 year old male here for acute right great toe pain. Began last night fairly suddenly. Took ibuprofen at low dose but not much help. It is swollen and a bit red. His father has gout. He does eat a lot of meat. Hurts to walk on it. He tried icing it.     PAST MEDICAL HISTORY:  Patient  has a past medical history of Diverticulosis (04/06/2018), High cholesterol, and Hyperplastic colon polyp (04/12/2018).    PAST SURGICAL HISTORY:  Patient  has a past surgical history that includes colonoscopy diagnostic (04/06/2018).    ALLERGIES:   Allergen Reactions   • Amoxicillin RASH   • Penicillins Other (See Comments)     unknown     MEDICATIONS:   Current Outpatient Medications   Medication   • atorvastatin (LIPITOR) 10 MG tablet   • Vitamin D, Ergocalciferol, 65034 units capsule   • ONETOUCH DELICA LANCETS 33G Misc   • ONETOUCH VERIO test strip   • indomethacin (INDOCIN SR) 75 MG CR capsule     No current facility-administered medications for this visit.        SOCIAL HISTORY:  Social History     Tobacco Use   Smoking Status Never Smoker   Smokeless Tobacco Former User   • Types: Chew     Social History     Substance and Sexual Activity   Alcohol Use Yes   • Alcohol/week: 12.0 standard drinks   • Types: 12 Cans of beer per week    Comment: 12 beers weekly        Family History   Problem Relation Age of Onset   • Diabetes Mother    • High blood pressure Mother    • Dementia/Alzheimers Mother    • Cancer Father    • Diabetes Brother    • Cancer Maternal Grandmother    • Cancer Maternal Grandfather    • * Paternal Grandmother         old age   • Cancer Paternal Grandfather        PHYSICAL EXAMINATION:   GENERAL: awake, alert and oriented and in mild acute distress  VITALS: Visit Vitals  /82   Pulse 69   Wt 81.5 kg Comment: w/shoes   BMI 28.55 kg/m²     EXTREMITIES:  Range of motion of all joints is normal, right great toe is mildly swollen, red and hot, tender on medial aspect of MTP  NEURO:   awake, alert, oriented X3 and antalgic gait    ASSESSMENT AND PLAN:  ASSESSMENT: (M10.071) Acute idiopathic gout involving toe of right foot  (primary encounter diagnosis)  Comment: given indocin, do not ice. Cont to elevate. If persists will do steroids or colchicine. check uric acid and if very high should be on allopurinol. If reoccurs should be on allopurinol  Plan: URIC ACID

## 2019-09-09 DIAGNOSIS — E03.9 ACQUIRED HYPOTHYROIDISM: ICD-10-CM

## 2019-09-09 RX ORDER — LEVOTHYROXINE SODIUM 0.2 MG/1
TABLET ORAL
Qty: 90 TABLET | Refills: 0 | Status: SHIPPED | OUTPATIENT
Start: 2019-09-09 | End: 2020-03-10

## 2019-09-09 RX ORDER — FINASTERIDE 5 MG/1
TABLET, FILM COATED ORAL
Qty: 90 TABLET | Refills: 0 | Status: SHIPPED | OUTPATIENT
Start: 2019-09-09 | End: 2019-12-31

## 2019-09-09 NOTE — TELEPHONE ENCOUNTER
Last OV : 6/24/2019 Med f/u  Upcoming appt/reason:    Future Appointments   Date Time Provider Gabriele Muñoz   11/12/2019  2:00 PM MD KIMBERLY Sepulveda     Levothyroxine Sodium 200 MCG Oral Tab 90 tablet 0 5/22/2019     finasteride 5

## 2019-09-10 ENCOUNTER — MED REC SCAN ONLY (OUTPATIENT)
Dept: FAMILY MEDICINE CLINIC | Facility: CLINIC | Age: 74
End: 2019-09-10

## 2019-09-27 ENCOUNTER — TELEPHONE (OUTPATIENT)
Dept: FAMILY MEDICINE CLINIC | Facility: CLINIC | Age: 74
End: 2019-09-27

## 2019-09-27 NOTE — TELEPHONE ENCOUNTER
Pt asking if Dr. Roscoe Landon can order a PSA lab for him.     Pt coming in October 2 for diabetic check

## 2019-09-27 NOTE — TELEPHONE ENCOUNTER
Adin Ye from [facility] called. She just wanted to update Dr. Aníbal Carpenter. Patient had a lot of coffee this am. He has had a few irregular heart beats since, but otherwise is asymptomatic.  She will continue to monitor this and will update you again next week or

## 2019-09-30 DIAGNOSIS — C61 MALIGNANT NEOPLASM OF PROSTATE (HCC): Primary | ICD-10-CM

## 2019-10-01 ENCOUNTER — TELEPHONE (OUTPATIENT)
Dept: FAMILY MEDICINE CLINIC | Facility: CLINIC | Age: 74
End: 2019-10-01

## 2019-10-01 NOTE — TELEPHONE ENCOUNTER
Son, Clement states pt heart is out of rhythm and that's why he's having a hard time breathing according to their home nurse. Son states pt is refusing to go to ER.  Clement wants  aware because he wants him to advise pt to go to hospital because he tr

## 2019-10-01 NOTE — TELEPHONE ENCOUNTER
Spoke to Ambrose, son. He says patient complains of chest pain and SOB (patient denies because he doesn't want to go to ER). every time he complains, they say we need to get medical attention, then blames symptoms on gas or whatever. He refuses PT.  Refuses t

## 2019-10-01 NOTE — TELEPHONE ENCOUNTER
I left message on son Clay's cell. Also I spoke to patient. He said he feels fine today, other than a \"cold\", everyone at home sick. He just got a new C-PAP machine. He said the nurse noticed irregular HR last week.  Denies palpitations, dizziness, shortn

## 2019-10-02 ENCOUNTER — OFFICE VISIT (OUTPATIENT)
Dept: FAMILY MEDICINE CLINIC | Facility: CLINIC | Age: 74
End: 2019-10-02
Payer: MEDICARE

## 2019-10-02 VITALS
TEMPERATURE: 98 F | SYSTOLIC BLOOD PRESSURE: 130 MMHG | RESPIRATION RATE: 16 BRPM | OXYGEN SATURATION: 97 % | HEIGHT: 75 IN | DIASTOLIC BLOOD PRESSURE: 80 MMHG | WEIGHT: 315 LBS | HEART RATE: 90 BPM | BODY MASS INDEX: 39.17 KG/M2

## 2019-10-02 DIAGNOSIS — I10 ESSENTIAL HYPERTENSION WITH GOAL BLOOD PRESSURE LESS THAN 140/90: ICD-10-CM

## 2019-10-02 DIAGNOSIS — E11.22 TYPE 2 DIABETES MELLITUS WITH STAGE 4 CHRONIC KIDNEY DISEASE, WITHOUT LONG-TERM CURRENT USE OF INSULIN (HCC): Primary | ICD-10-CM

## 2019-10-02 DIAGNOSIS — N18.4 TYPE 2 DIABETES MELLITUS WITH STAGE 4 CHRONIC KIDNEY DISEASE, WITHOUT LONG-TERM CURRENT USE OF INSULIN (HCC): Primary | ICD-10-CM

## 2019-10-02 DIAGNOSIS — J22 ACUTE LOWER RESPIRATORY INFECTION: ICD-10-CM

## 2019-10-02 DIAGNOSIS — E66.01 MORBID OBESITY (HCC): ICD-10-CM

## 2019-10-02 DIAGNOSIS — C61 MALIGNANT NEOPLASM OF PROSTATE (HCC): ICD-10-CM

## 2019-10-02 DIAGNOSIS — F41.1 GENERALIZED ANXIETY DISORDER: ICD-10-CM

## 2019-10-02 DIAGNOSIS — E03.9 ACQUIRED HYPOTHYROIDISM: ICD-10-CM

## 2019-10-02 PROCEDURE — 99214 OFFICE O/P EST MOD 30 MIN: CPT | Performed by: FAMILY MEDICINE

## 2019-10-02 RX ORDER — CLONAZEPAM 2 MG/1
2 TABLET ORAL 2 TIMES DAILY PRN
Qty: 60 TABLET | Refills: 2 | Status: SHIPPED | OUTPATIENT
Start: 2019-10-02 | End: 2019-12-16

## 2019-10-02 RX ORDER — AZITHROMYCIN 250 MG/1
TABLET, FILM COATED ORAL
Qty: 6 TABLET | Refills: 0 | Status: SHIPPED | OUTPATIENT
Start: 2019-10-02 | End: 2019-11-12

## 2019-10-02 NOTE — TELEPHONE ENCOUNTER
Pt has refused several times to go to ER. Pt seeing you today for DM. Not sure what else to do as he refuses ER. We have it well documented though.

## 2019-10-02 NOTE — PROGRESS NOTES
[facility] Family Medicine Office Note  Chief Complaint:   Patient presents with:  Medication Follow-Up: DM CHECK      HPI:   This is a 75year old male coming in for follow up of DM2, anxiety, hyperlipidemia, HTN, hypothyroidism, obesity, prosta clear his throat. Denies any fever. + nasal congestion.       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/07    to right presumed secondary to an enlarged thyroid   • Type II or unspecified type diabetes mellitus without mention of complication, not stated as uncontrolled 10/2 DAILY Disp: 180 capsule Rfl: 0   finasteride 5 MG Oral Tab TAKE ONE TABLET BY MOUTH ONCE DAILY Disp: 90 tablet Rfl: 0   Levothyroxine Sodium 200 MCG Oral Tab TAKE 1 TABLET BY MOUTH EVERY DAY Disp: 90 tablet Rfl: 0   SIMVASTATIN 40 MG Oral Tab TAKE 1 TABLET tablet Rfl: 3   METFORMIN HCL 1000 MG Oral Tab TAKE 1 TABLET TWICE DAILY  WITH MEALS Disp: 180 tablet Rfl: 0   Albuterol Sulfate HFA (PROAIR HFA) 108 (90 BASE) MCG/ACT Inhalation Aero Soln Inhale 2 puffs into the lungs every 6 (six) hours as needed for [facility] Mouth:  No oral lesions or ulcerations, no dental abnormalities noted.   NECK:  Supple, no LAD  LUNGS: clear to auscultation bilaterally, no rales/rhonchi/wheezing  HEART:  Regular rate and rhythm, no murmurs, rubs or gallops  ABDOMEN:  Soft, nondistended, clonazePAM (KLONOPIN) 2 MG Oral Tab 60 tablet 2     Sig: Take 1 tablet (2 mg total) by mouth 2 (two) times daily as needed for Anxiety. • azithromycin 250 MG Oral Tab 6 tablet 0     Sig: Take two tablets by mouth today, then one daily.        Health Maint Cellulitis of lower extremity     Prostate cancer ([facility]     Hyperglycemia     Type 2 diabetes mellitus with hyperglycemia, without long-term current use of insulin (HCC)     Hypothyroidism     Class 2 obesity with serious comorbidity and body mass index (B

## 2019-10-02 NOTE — TELEPHONE ENCOUNTER
He should go to ER with those symptoms. I'm not sure what to do about the other issues of not bathing or checking his BS. Only thing I can do is encourage him to do both to prevent future hospitalizations.

## 2019-10-02 NOTE — TELEPHONE ENCOUNTER
You Tai RN home health called to report irreg HR today (called yesterday on this). She is aware of son conversation from yesterday. I did advise ER again but pt will not go. She said he denies any cp, only occ sob. States she thinks it may be anxiety.

## 2019-10-09 ENCOUNTER — TELEPHONE (OUTPATIENT)
Dept: FAMILY MEDICINE CLINIC | Facility: CLINIC | Age: 74
End: 2019-10-09

## 2019-10-09 NOTE — TELEPHONE ENCOUNTER
Call from [facility] with pt now. Family reporting pt fell 10/16/19. Reports pt was in garage w wife, \"did not lose balance-just slipped. \"  sts pt hit head on bumper of car, no LOC/bump/abrasion/laceration on head or scalp.  a

## 2019-10-09 NOTE — TELEPHONE ENCOUNTER
Natalya Lozoya for home health orders. If he did hit his head, he should go to ER. If family is concerned about him, please ask them to come with him to next OV as he doesn't report any of this during the office visit.

## 2019-10-09 NOTE — TELEPHONE ENCOUNTER
Call to [facility] RN/residential home health-advised of dr adler's comments and recommendations noted below. Advised bon to reinforce w pt/family dr adler's recommendation for ER evaluation of head injury.  If they choose not to do ER eval, reinforce that

## 2019-10-25 ENCOUNTER — MED REC SCAN ONLY (OUTPATIENT)
Dept: FAMILY MEDICINE CLINIC | Facility: CLINIC | Age: 74
End: 2019-10-25

## 2019-10-25 RX ORDER — GLIPIZIDE 10 MG/1
TABLET ORAL
Qty: 90 TABLET | Refills: 0 | Status: SHIPPED | OUTPATIENT
Start: 2019-10-25 | End: 2020-01-20

## 2019-11-05 ENCOUNTER — MED REC SCAN ONLY (OUTPATIENT)
Dept: FAMILY MEDICINE CLINIC | Facility: CLINIC | Age: 74
End: 2019-11-05

## 2019-11-25 ENCOUNTER — TELEPHONE (OUTPATIENT)
Dept: FAMILY MEDICINE CLINIC | Facility: CLINIC | Age: 74
End: 2019-11-25

## 2019-11-25 NOTE — TELEPHONE ENCOUNTER
Nilda therapist from [facility] calling for verbal permission that pt have in-home PT once a week for 4 weeks, orders will be faxed to you for sign off.

## 2019-11-29 DIAGNOSIS — J43.1 PANLOBULAR EMPHYSEMA (HCC): ICD-10-CM

## 2019-11-29 RX ORDER — BUDESONIDE AND FORMOTEROL FUMARATE DIHYDRATE 160; 4.5 UG/1; UG/1
AEROSOL RESPIRATORY (INHALATION)
Qty: 3 INHALER | Refills: 0 | Status: SHIPPED | OUTPATIENT
Start: 2019-11-29 | End: 2020-01-13

## 2019-12-06 ENCOUNTER — MED REC SCAN ONLY (OUTPATIENT)
Dept: FAMILY MEDICINE CLINIC | Facility: CLINIC | Age: 74
End: 2019-12-06

## 2019-12-16 ENCOUNTER — OFFICE VISIT (OUTPATIENT)
Dept: FAMILY MEDICINE CLINIC | Facility: CLINIC | Age: 74
End: 2019-12-16
Payer: MEDICARE

## 2019-12-16 ENCOUNTER — APPOINTMENT (OUTPATIENT)
Dept: LAB | Age: 74
End: 2019-12-16
Attending: FAMILY MEDICINE
Payer: MEDICARE

## 2019-12-16 VITALS
WEIGHT: 315 LBS | SYSTOLIC BLOOD PRESSURE: 134 MMHG | RESPIRATION RATE: 18 BRPM | HEIGHT: 75 IN | DIASTOLIC BLOOD PRESSURE: 68 MMHG | BODY MASS INDEX: 39.17 KG/M2 | HEART RATE: 88 BPM

## 2019-12-16 DIAGNOSIS — I10 ESSENTIAL HYPERTENSION WITH GOAL BLOOD PRESSURE LESS THAN 140/90: ICD-10-CM

## 2019-12-16 DIAGNOSIS — N18.4 TYPE 2 DIABETES MELLITUS WITH STAGE 4 CHRONIC KIDNEY DISEASE, WITHOUT LONG-TERM CURRENT USE OF INSULIN (HCC): ICD-10-CM

## 2019-12-16 DIAGNOSIS — F41.1 GENERALIZED ANXIETY DISORDER: ICD-10-CM

## 2019-12-16 DIAGNOSIS — E03.9 ACQUIRED HYPOTHYROIDISM: ICD-10-CM

## 2019-12-16 DIAGNOSIS — N18.4 TYPE 2 DIABETES MELLITUS WITH STAGE 4 CHRONIC KIDNEY DISEASE, WITHOUT LONG-TERM CURRENT USE OF INSULIN (HCC): Primary | ICD-10-CM

## 2019-12-16 DIAGNOSIS — E78.2 MIXED HYPERLIPIDEMIA: ICD-10-CM

## 2019-12-16 DIAGNOSIS — E11.22 TYPE 2 DIABETES MELLITUS WITH STAGE 4 CHRONIC KIDNEY DISEASE, WITHOUT LONG-TERM CURRENT USE OF INSULIN (HCC): ICD-10-CM

## 2019-12-16 DIAGNOSIS — E11.22 TYPE 2 DIABETES MELLITUS WITH STAGE 4 CHRONIC KIDNEY DISEASE, WITHOUT LONG-TERM CURRENT USE OF INSULIN (HCC): Primary | ICD-10-CM

## 2019-12-16 DIAGNOSIS — C61 MALIGNANT NEOPLASM OF PROSTATE (HCC): ICD-10-CM

## 2019-12-16 PROCEDURE — 84153 ASSAY OF PSA TOTAL: CPT

## 2019-12-16 PROCEDURE — 80053 COMPREHEN METABOLIC PANEL: CPT

## 2019-12-16 PROCEDURE — 83036 HEMOGLOBIN GLYCOSYLATED A1C: CPT

## 2019-12-16 PROCEDURE — 80061 LIPID PANEL: CPT

## 2019-12-16 PROCEDURE — 36415 COLL VENOUS BLD VENIPUNCTURE: CPT

## 2019-12-16 PROCEDURE — 99214 OFFICE O/P EST MOD 30 MIN: CPT | Performed by: FAMILY MEDICINE

## 2019-12-16 PROCEDURE — 84443 ASSAY THYROID STIM HORMONE: CPT

## 2019-12-16 PROCEDURE — 84439 ASSAY OF FREE THYROXINE: CPT

## 2019-12-16 RX ORDER — CLONAZEPAM 2 MG/1
2 TABLET ORAL 2 TIMES DAILY PRN
Qty: 60 TABLET | Refills: 2 | Status: SHIPPED | OUTPATIENT
Start: 2019-12-16 | End: 2020-03-16

## 2019-12-16 NOTE — PROGRESS NOTES
[facility] Family Medicine Office Note  Chief Complaint:   Patient presents with:  Medication Follow-Up      HPI:   This is a 75year old male coming in for follow up of DM2, anxiety, hyperlipidemia, HTN and hypothyroidism.     1.  DM2 - The patie bifemoral atherosclerotic iliac disease   • CAD (coronary artery disease)    • Cancer (HCC)     prostate   • Cerumen impaction 5/26/05    right   • Congestive heart disease ([facility]     not sure when last episode was- many years ago   • COPD (chronic obstr tract infection) 3/1/05   • Visual impairment      Past Surgical History:   Procedure Laterality Date   • ANGIOGRAM     • CARDIAC CATHERTERIZATION (PBP)  09/28/2007    left heart cath,liclat selective coronary angiogram,left iliofemoral angiogram   • CATH 90 tablet 3   • finasteride 5 MG Oral Tab TAKE ONE TABLET BY MOUTH ONCE DAILY 90 tablet 0   • Levothyroxine Sodium 200 MCG Oral Tab TAKE 1 TABLET BY MOUTH EVERY DAY 90 tablet 0   • SIMVASTATIN 40 MG Oral Tab TAKE 1 TABLET EVERY NIGHT 90 tablet 0   • rosaura runny nose or sore throat. + congestion  SKIN:  No rash or itching. CARDIOVASCULAR:  Denies chest pain, chest pressure or chest discomfort. No palpitations or edema.   Denies any dyspnea on exertion or at rest  RESPIRATORY:  Denies shortness of breath, + c in 3 months    2. Essential hypertension with goal blood pressure less than 140/90  -  Controlled  -  Continue present mgmt  -  Check renal function  -  Monitor BP at home  - COMP METABOLIC PANEL (14); Future    3.  Acquired hypothyroidism  -  Recheck TSH a disorder     S/P coronary artery stent placement     Depression     Hemorrhoids     Hypercholesterolemia     CAD (coronary artery disease)     Metabolic syndrome     Smoker     Gallstone     Tracheal deviation     Echocardiogram abnormal     Osteoarthritis

## 2019-12-27 ENCOUNTER — TELEPHONE (OUTPATIENT)
Dept: FAMILY MEDICINE CLINIC | Facility: CLINIC | Age: 74
End: 2019-12-27

## 2019-12-27 NOTE — TELEPHONE ENCOUNTER
Call from [facility] RN/residential home health-sts calling for orders from dr adler for SN visit to pt today and extension to continue same home care services for another certification period  sts tried calling ofc 12/24 but ofc was closed and forgot to call

## 2019-12-30 ENCOUNTER — TELEPHONE (OUTPATIENT)
Dept: FAMILY MEDICINE CLINIC | Facility: CLINIC | Age: 74
End: 2019-12-30

## 2019-12-30 NOTE — TELEPHONE ENCOUNTER
Pt called to inquire about samples of symbicort that you were looking into for him. Any update? We do not have any samples in the office.

## 2019-12-30 NOTE — TELEPHONE ENCOUNTER
At last OV, pt said Dr. Carleen Ross was going to look into samples of Symbicort. Pt calling to get update.

## 2019-12-30 NOTE — TELEPHONE ENCOUNTER
Call to [facility] RN/residential home health  reaches identified confidential voice mail-left vmm w dr adler's verbal ok to extend SN visits as requested.    Advised if any questions re this info, call back and speak w triage nurse-provided our ofc ho

## 2019-12-31 RX ORDER — FINASTERIDE 5 MG/1
TABLET, FILM COATED ORAL
Qty: 90 TABLET | Refills: 0 | Status: SHIPPED | OUTPATIENT
Start: 2019-12-31 | End: 2020-04-02

## 2020-01-10 NOTE — TELEPHONE ENCOUNTER
Spoke with customer service at [employer] 2 times to inquire about samples. I still have not received any call back with a local rep in our area.

## 2020-01-13 NOTE — TELEPHONE ENCOUNTER
Pt was notified we have reached out to the drug rep for Symbicort and are waiting to hear from them. We will notify you as soon as we hear from them. Pt verbalized understanding.

## 2020-01-20 ENCOUNTER — TELEPHONE (OUTPATIENT)
Dept: FAMILY MEDICINE CLINIC | Facility: CLINIC | Age: 75
End: 2020-01-20

## 2020-01-20 RX ORDER — GLIPIZIDE 10 MG/1
10 TABLET ORAL DAILY
Qty: 90 TABLET | Refills: 1 | Status: SHIPPED | OUTPATIENT
Start: 2020-01-20 | End: 2020-09-14

## 2020-01-20 NOTE — TELEPHONE ENCOUNTER
Last Refill: 10/25/2019  Last visit: 12/16/2019  Labs: 12/16/2019    Patient will be due for follow up med check in June of 2020. Please call to schedule office visit in June with Dr. Gabriela Flores    Refilled for 90 with 1 refill.    Aarti Roach, 01/20/20, 9:36 AM

## 2020-01-27 ENCOUNTER — APPOINTMENT (OUTPATIENT)
Dept: LAB | Age: 75
End: 2020-01-27
Attending: FAMILY MEDICINE
Payer: MEDICARE

## 2020-01-27 DIAGNOSIS — E03.9 HYPOTHYROIDISM, UNSPECIFIED TYPE: ICD-10-CM

## 2020-01-27 LAB
T4 FREE SERPL-MCNC: 1.3 NG/DL (ref 0.8–1.7)
TSI SER-ACNC: 0.9 MIU/ML (ref 0.36–3.74)

## 2020-01-27 PROCEDURE — 84439 ASSAY OF FREE THYROXINE: CPT

## 2020-01-27 PROCEDURE — 36415 COLL VENOUS BLD VENIPUNCTURE: CPT

## 2020-01-27 PROCEDURE — 84443 ASSAY THYROID STIM HORMONE: CPT

## 2020-01-28 NOTE — TELEPHONE ENCOUNTER
Pt states he plans to make an appt to see  next week. He states he will schedule the appt for June later.

## 2020-02-13 ENCOUNTER — MED REC SCAN ONLY (OUTPATIENT)
Dept: FAMILY MEDICINE CLINIC | Facility: CLINIC | Age: 75
End: 2020-02-13

## 2020-02-19 ENCOUNTER — TELEPHONE (OUTPATIENT)
Dept: FAMILY MEDICINE CLINIC | Facility: CLINIC | Age: 75
End: 2020-02-19

## 2020-02-19 NOTE — TELEPHONE ENCOUNTER
[facility] nurse Health   Plan of care update   D/c plan for next week   Goals met   [facility] was ordered for CHF exac   [facility]

## 2020-02-25 ENCOUNTER — TELEPHONE (OUTPATIENT)
Dept: FAMILY MEDICINE CLINIC | Facility: CLINIC | Age: 75
End: 2020-02-25

## 2020-02-25 NOTE — TELEPHONE ENCOUNTER
Left voice message for patient to call back to schedule a Medicare Annual Wellness visit.     Last Medicare Annual date:

## 2020-03-10 ENCOUNTER — MED REC SCAN ONLY (OUTPATIENT)
Dept: FAMILY MEDICINE CLINIC | Facility: CLINIC | Age: 75
End: 2020-03-10

## 2020-03-10 DIAGNOSIS — E03.9 ACQUIRED HYPOTHYROIDISM: ICD-10-CM

## 2020-03-10 RX ORDER — LEVOTHYROXINE SODIUM 0.2 MG/1
TABLET ORAL
Qty: 90 TABLET | Refills: 0 | Status: SHIPPED | OUTPATIENT
Start: 2020-03-10 | End: 2020-06-04

## 2020-03-10 RX ORDER — LEVOTHYROXINE SODIUM 0.07 MG/1
TABLET ORAL
Qty: 90 TABLET | Refills: 0 | Status: SHIPPED | OUTPATIENT
Start: 2020-03-10 | End: 2020-06-04

## 2020-03-16 DIAGNOSIS — F41.1 GENERALIZED ANXIETY DISORDER: ICD-10-CM

## 2020-03-16 RX ORDER — CLONAZEPAM 2 MG/1
2 TABLET ORAL 2 TIMES DAILY PRN
Qty: 60 TABLET | Refills: 2 | Status: SHIPPED | OUTPATIENT
Start: 2020-03-16 | End: 2020-06-04

## 2020-03-16 NOTE — TELEPHONE ENCOUNTER
LOV: 12/16/19 med check  Next appt: 4/16/2020    Clonazepam  Last refill: 12/16/19 60 tabs 2 refills    Please refill if appropriate. Thank you.

## 2020-04-02 RX ORDER — FINASTERIDE 5 MG/1
TABLET, FILM COATED ORAL
Qty: 90 TABLET | Refills: 0 | Status: SHIPPED | OUTPATIENT
Start: 2020-04-02 | End: 2020-07-20

## 2020-04-14 RX ORDER — ALBUTEROL SULFATE 90 UG/1
2 AEROSOL, METERED RESPIRATORY (INHALATION) EVERY 4 HOURS PRN
Qty: 3 INHALER | Refills: 1 | Status: SHIPPED | OUTPATIENT
Start: 2020-04-14 | End: 2020-04-15

## 2020-04-14 NOTE — TELEPHONE ENCOUNTER
Received request for refill of albuterol HFA inhaler. Last OV 12/16/19 last refill was not by our office 2/25/19. Pt has Dx of COPD and emphysema. Please review and refill if appropriate.   Thank you

## 2020-04-15 RX ORDER — ALBUTEROL SULFATE 90 UG/1
2 AEROSOL, METERED RESPIRATORY (INHALATION) EVERY 4 HOURS PRN
Qty: 3 INHALER | Refills: 1 | Status: SHIPPED | OUTPATIENT
Start: 2020-04-15 | End: 2020-12-17

## 2020-04-15 NOTE — TELEPHONE ENCOUNTER
Pt called again to request refill of his   Albuterol Sulfate  (90 Base) MCG/ACT Inhalation Aero Soln 3 Inhaler 1     Sent to [facility]/PHARMACY #[id number] - [address], [phone], [phone]. Thank you.

## 2020-05-07 ENCOUNTER — TELEPHONE (OUTPATIENT)
Dept: FAMILY MEDICINE CLINIC | Facility: CLINIC | Age: 75
End: 2020-05-07

## 2020-06-03 DIAGNOSIS — F41.1 GENERALIZED ANXIETY DISORDER: ICD-10-CM

## 2020-06-04 DIAGNOSIS — F41.1 GENERALIZED ANXIETY DISORDER: ICD-10-CM

## 2020-06-04 DIAGNOSIS — E03.9 ACQUIRED HYPOTHYROIDISM: ICD-10-CM

## 2020-06-04 RX ORDER — LEVOTHYROXINE SODIUM 0.2 MG/1
TABLET ORAL
Qty: 90 TABLET | Refills: 0 | Status: SHIPPED | OUTPATIENT
Start: 2020-06-04 | End: 2020-10-05

## 2020-06-04 RX ORDER — CLONAZEPAM 2 MG/1
2 TABLET ORAL 2 TIMES DAILY PRN
Qty: 60 TABLET | Refills: 0 | OUTPATIENT
Start: 2020-06-04

## 2020-06-04 RX ORDER — CLONAZEPAM 2 MG/1
TABLET ORAL
Qty: 60 TABLET | Refills: 0 | Status: SHIPPED | OUTPATIENT
Start: 2020-06-04 | End: 2020-07-01

## 2020-06-04 RX ORDER — LEVOTHYROXINE SODIUM 0.07 MG/1
TABLET ORAL
Qty: 90 TABLET | Refills: 0 | Status: SHIPPED | OUTPATIENT
Start: 2020-06-04 | End: 2020-09-09

## 2020-06-04 NOTE — TELEPHONE ENCOUNTER
Pharmacy called for a refill for pt.s Clonazepam.  Last refill was 03/16/2020 #60 w/ 2 refills. He should not have to refill until 06/16/2020. Pt requesting it now.  Please refill if appropriate

## 2020-06-25 NOTE — TELEPHONE ENCOUNTER
LOV  12-16-19      3-27-19    Cancelled 6-19  And 4-16  appt and did not reschedule      PSR:  Please schedule DM or AWV (check date please )  Then route back for refill

## 2020-06-25 NOTE — TELEPHONE ENCOUNTER
Pt states he has not left the house for months and does not want to come in for apt due to Marco virus. He does not have necessary electronics to complete video visit. Please advise if a regular tele visit can be scheduled or if refill can be approved.

## 2020-07-01 DIAGNOSIS — F41.1 GENERALIZED ANXIETY DISORDER: ICD-10-CM

## 2020-07-01 RX ORDER — CLONAZEPAM 2 MG/1
TABLET ORAL
Qty: 60 TABLET | Refills: 0 | Status: SHIPPED | OUTPATIENT
Start: 2020-07-01 | End: 2020-07-17

## 2020-07-17 ENCOUNTER — OFFICE VISIT (OUTPATIENT)
Dept: FAMILY MEDICINE CLINIC | Facility: CLINIC | Age: 75
End: 2020-07-17
Payer: MEDICARE

## 2020-07-17 ENCOUNTER — APPOINTMENT (OUTPATIENT)
Dept: LAB | Age: 75
End: 2020-07-17
Attending: FAMILY MEDICINE
Payer: MEDICARE

## 2020-07-17 VITALS
HEART RATE: 88 BPM | SYSTOLIC BLOOD PRESSURE: 140 MMHG | DIASTOLIC BLOOD PRESSURE: 70 MMHG | RESPIRATION RATE: 20 BRPM | TEMPERATURE: 98 F | HEIGHT: 75 IN | BODY MASS INDEX: 35.43 KG/M2 | WEIGHT: 285 LBS | OXYGEN SATURATION: 98 %

## 2020-07-17 DIAGNOSIS — I10 ESSENTIAL HYPERTENSION WITH GOAL BLOOD PRESSURE LESS THAN 140/90: ICD-10-CM

## 2020-07-17 DIAGNOSIS — F41.1 GENERALIZED ANXIETY DISORDER: ICD-10-CM

## 2020-07-17 DIAGNOSIS — E11.22 TYPE 2 DIABETES MELLITUS WITH STAGE 4 CHRONIC KIDNEY DISEASE, WITHOUT LONG-TERM CURRENT USE OF INSULIN (HCC): Primary | ICD-10-CM

## 2020-07-17 DIAGNOSIS — E03.9 ACQUIRED HYPOTHYROIDISM: ICD-10-CM

## 2020-07-17 DIAGNOSIS — N18.4 TYPE 2 DIABETES MELLITUS WITH STAGE 4 CHRONIC KIDNEY DISEASE, WITHOUT LONG-TERM CURRENT USE OF INSULIN (HCC): Primary | ICD-10-CM

## 2020-07-17 DIAGNOSIS — E78.2 MIXED HYPERLIPIDEMIA: ICD-10-CM

## 2020-07-17 DIAGNOSIS — J43.1 PANLOBULAR EMPHYSEMA (HCC): ICD-10-CM

## 2020-07-17 LAB
ALBUMIN SERPL-MCNC: 3.4 G/DL (ref 3.4–5)
ALBUMIN/GLOB SERPL: 0.9 {RATIO} (ref 1–2)
ALP LIVER SERPL-CCNC: 101 U/L (ref 45–117)
ALT SERPL-CCNC: 30 U/L (ref 16–61)
ANION GAP SERPL CALC-SCNC: 7 MMOL/L (ref 0–18)
AST SERPL-CCNC: 21 U/L (ref 15–37)
BILIRUB SERPL-MCNC: 0.3 MG/DL (ref 0.1–2)
BUN BLD-MCNC: 41 MG/DL (ref 7–18)
BUN/CREAT SERPL: 15.9 (ref 10–20)
CALCIUM BLD-MCNC: 9 MG/DL (ref 8.5–10.1)
CHLORIDE SERPL-SCNC: 99 MMOL/L (ref 98–112)
CHOLEST SMN-MCNC: 133 MG/DL (ref ?–200)
CO2 SERPL-SCNC: 26 MMOL/L (ref 21–32)
CREAT BLD-MCNC: 2.58 MG/DL (ref 0.7–1.3)
EST. AVERAGE GLUCOSE BLD GHB EST-MCNC: 243 MG/DL (ref 68–126)
GLOBULIN PLAS-MCNC: 3.6 G/DL (ref 2.8–4.4)
GLUCOSE BLD-MCNC: 425 MG/DL (ref 70–99)
HBA1C MFR BLD HPLC: 10.1 % (ref ?–5.7)
HDLC SERPL-MCNC: 25 MG/DL (ref 40–59)
LDLC SERPL DIRECT ASSAY-MCNC: 65 MG/DL (ref ?–100)
M PROTEIN MFR SERPL ELPH: 7 G/DL (ref 6.4–8.2)
NONHDLC SERPL-MCNC: 108 MG/DL (ref ?–130)
OSMOLALITY SERPL CALC.SUM OF ELEC: 302 MOSM/KG (ref 275–295)
PATIENT FASTING Y/N/NP: YES
PATIENT FASTING Y/N/NP: YES
POTASSIUM SERPL-SCNC: 5 MMOL/L (ref 3.5–5.1)
SODIUM SERPL-SCNC: 132 MMOL/L (ref 136–145)
T4 FREE SERPL-MCNC: 1.3 NG/DL (ref 0.8–1.7)
TRIGL SERPL-MCNC: 423 MG/DL (ref 30–149)
TSI SER-ACNC: 0.77 MIU/ML (ref 0.36–3.74)

## 2020-07-17 PROCEDURE — 99214 OFFICE O/P EST MOD 30 MIN: CPT | Performed by: FAMILY MEDICINE

## 2020-07-17 RX ORDER — TAMSULOSIN HYDROCHLORIDE 0.4 MG/1
0.8 CAPSULE ORAL DAILY
Qty: 180 CAPSULE | Refills: 1 | Status: SHIPPED | OUTPATIENT
Start: 2020-07-17 | End: 2020-12-31

## 2020-07-17 RX ORDER — CLONAZEPAM 2 MG/1
TABLET ORAL
Qty: 60 TABLET | Refills: 2 | Status: SHIPPED | OUTPATIENT
Start: 2020-07-17 | End: 2020-10-28

## 2020-07-17 NOTE — PROGRESS NOTES
[facility] Family Medicine Office Note  Chief Complaint:   Patient presents with:  Medication Follow-Up: HTN,DM      HPI:   This is a 75year old male coming in for follow up of DM2, anxiety, hyperlipidemia, HTN, hypothyroidism and COPD.     1.  D x-ray minimal right perihilar    • Atherosclerosis of coronary artery    • Atherosclerotic heart disease 9/27/2007    bifemoral atherosclerotic iliac disease   • CAD (coronary artery disease)    • Cancer [facility])     prostate   • Cerumen impaction 5/26/05 apnea DMG SPLIT 12-13-12    AHI 46 RDI 65 SaO2 87% CPAP 10 [employer]   • Urinary retention 11/05/2007   • UTI (urinary tract infection) 3/1/05   • Visual impairment      Past Surgical History:   Procedure Laterality Date   • ANGIOGRAM     • CARDIAC CATHERTERIZ 90 tablet 1   • Budesonide-Formoterol Fumarate (SYMBICORT) 160-4.5 MCG/ACT Inhalation Aerosol INHALE 2 PUFFS INTO THE LUNGS TWICE DAILY 3 Inhaler 3   • Potassium Chloride ER (KLOR-CON M20) 20 MEQ Oral Tab CR Take 1 tablet (20 mEq total) by mouth daily.  STEFFANIE sore throat. + congestion  SKIN:  No rash or itching. CARDIOVASCULAR:  Denies chest pain, chest pressure or chest discomfort. No palpitations or edema.   Denies any dyspnea on exertion or at rest  RESPIRATORY:  Denies shortness of breath, + chronic cough adjust meds to keep LDL near goal of 7  -  Encourage low carb diet  -  Eye referral provided at last OV  -  F/u in 3 months    2.  Essential hypertension with goal blood pressure less than 140/90  -  Controlled  -  Continue present mgmt  -  Check renal func Problem List:     Malignant neoplasm of prostate ([facility]     Essential hypertension, benign     DM type 2 (diabetes mellitus, type 2) (HCC)     Coronary atherosclerosis     BRITNEY (obstructive sleep apnea)     Myocardial infarct (HCC)     Panic disorder     S/P

## 2020-07-20 RX ORDER — FINASTERIDE 5 MG/1
TABLET, FILM COATED ORAL
Qty: 90 TABLET | Refills: 0 | Status: SHIPPED | OUTPATIENT
Start: 2020-07-20 | End: 2020-10-28

## 2020-07-22 ENCOUNTER — TELEPHONE (OUTPATIENT)
Dept: ENDOCRINOLOGY CLINIC | Facility: CLINIC | Age: 75
End: 2020-07-22

## 2020-07-23 ENCOUNTER — TELEPHONE (OUTPATIENT)
Dept: FAMILY MEDICINE CLINIC | Facility: CLINIC | Age: 75
End: 2020-07-23

## 2020-07-23 NOTE — TELEPHONE ENCOUNTER
1. What are your symptoms? Cough w/green phlegm, fatigued, difficulty sleeping, SOB, runny nose. 2. How long have you been having these symptoms? Since Sat., 7/18/2020    3. Have you done anything already to treat your symptoms?         ADDITIONAL

## 2020-07-23 NOTE — TELEPHONE ENCOUNTER
RANDEEI: I  called pt.back. I explained to him the importance of being evaluated so the doctor knows how to treat his symptoms. Pt was expressing he feels he picked up something in the office last Friday.  I informed him there are many different things that cou

## 2020-07-23 NOTE — TELEPHONE ENCOUNTER
Pt called back. I advised of below. He states \"sts so im screwed, I wanna swear so bad. Then I was not able to hear pt. I yelled hello several times without response. I stated I am hanging and will call back. Once I hung up I rec'd another call.

## 2020-07-23 NOTE — TELEPHONE ENCOUNTER
Pt was in to see you last Friday. He said to tell you \" I felt like a million bucks on Friday and now I feel like 10 bucks. \" he started on Saturday with a prod cough with green phlegm, fatigue, shortness of breath w/ exertion, difficulty sleeping.  He den

## 2020-07-23 NOTE — TELEPHONE ENCOUNTER
I'm not sure what I can do if he cannot go to immediate care or ER. He needs to be evaluated to r/o pneumonia vs. CHF or COPD exacerbation vs. covid.

## 2020-08-26 RX ORDER — GABAPENTIN 300 MG/1
CAPSULE ORAL
Qty: 270 CAPSULE | Refills: 1 | Status: SHIPPED | OUTPATIENT
Start: 2020-08-26 | End: 2020-12-09

## 2020-08-26 NOTE — TELEPHONE ENCOUNTER
Pt is out of refills and was told to contact doctor for refill of. .    gabapentin 300 MG Oral Cap 270 capsule      Sig:   TAKE 1 CAPSULE BY MOUTH DAILY in the am       Pharmacy is     Pharmacy     [facility] [address] [facility]

## 2020-09-09 RX ORDER — LEVOTHYROXINE SODIUM 0.07 MG/1
TABLET ORAL
Qty: 90 TABLET | Refills: 0 | Status: SHIPPED | OUTPATIENT
Start: 2020-09-09 | End: 2020-10-30 | Stop reason: DRUGHIGH

## 2020-09-14 RX ORDER — GLIPIZIDE 10 MG/1
TABLET ORAL
Qty: 90 TABLET | Refills: 1 | Status: SHIPPED | OUTPATIENT
Start: 2020-09-14 | End: 2021-03-12

## 2020-10-03 DIAGNOSIS — E03.9 ACQUIRED HYPOTHYROIDISM: ICD-10-CM

## 2020-10-05 ENCOUNTER — TELEPHONE (OUTPATIENT)
Dept: FAMILY MEDICINE CLINIC | Facility: CLINIC | Age: 75
End: 2020-10-05

## 2020-10-05 RX ORDER — LEVOTHYROXINE SODIUM 0.2 MG/1
TABLET ORAL
Qty: 90 TABLET | Refills: 0 | Status: SHIPPED | OUTPATIENT
Start: 2020-10-05 | End: 2020-10-30 | Stop reason: DRUGHIGH

## 2020-10-16 ENCOUNTER — TELEPHONE (OUTPATIENT)
Dept: FAMILY MEDICINE CLINIC | Facility: CLINIC | Age: 75
End: 2020-10-16

## 2020-10-16 NOTE — TELEPHONE ENCOUNTER
Pt asking for script for eye drops. Both eyes have been itchy, watery and sometimes burning ever since he went to get a hair cut about two weeks ago and had to hold his mask on his face which touched he eyes.

## 2020-10-23 NOTE — TELEPHONE ENCOUNTER
Please call pt to schedule med check with Dr. Magen Gillespie. LOV: 7/17/2020 asked to return in 3 months    Thank you.

## 2020-10-28 ENCOUNTER — OFFICE VISIT (OUTPATIENT)
Dept: FAMILY MEDICINE CLINIC | Facility: CLINIC | Age: 75
End: 2020-10-28
Payer: MEDICARE

## 2020-10-28 ENCOUNTER — LAB ENCOUNTER (OUTPATIENT)
Dept: LAB | Age: 75
End: 2020-10-28
Attending: FAMILY MEDICINE
Payer: MEDICARE

## 2020-10-28 VITALS
OXYGEN SATURATION: 96 % | BODY MASS INDEX: 39.17 KG/M2 | WEIGHT: 315 LBS | DIASTOLIC BLOOD PRESSURE: 80 MMHG | SYSTOLIC BLOOD PRESSURE: 120 MMHG | HEIGHT: 75 IN | RESPIRATION RATE: 16 BRPM | TEMPERATURE: 98 F | HEART RATE: 84 BPM

## 2020-10-28 DIAGNOSIS — C61 MALIGNANT NEOPLASM OF PROSTATE (HCC): ICD-10-CM

## 2020-10-28 DIAGNOSIS — E78.2 MIXED HYPERLIPIDEMIA: ICD-10-CM

## 2020-10-28 DIAGNOSIS — E11.22 TYPE 2 DIABETES MELLITUS WITH STAGE 4 CHRONIC KIDNEY DISEASE, WITHOUT LONG-TERM CURRENT USE OF INSULIN (HCC): ICD-10-CM

## 2020-10-28 DIAGNOSIS — I10 ESSENTIAL HYPERTENSION WITH GOAL BLOOD PRESSURE LESS THAN 140/90: ICD-10-CM

## 2020-10-28 DIAGNOSIS — E03.9 ACQUIRED HYPOTHYROIDISM: ICD-10-CM

## 2020-10-28 DIAGNOSIS — N18.4 TYPE 2 DIABETES MELLITUS WITH STAGE 4 CHRONIC KIDNEY DISEASE, WITHOUT LONG-TERM CURRENT USE OF INSULIN (HCC): ICD-10-CM

## 2020-10-28 DIAGNOSIS — E11.22 TYPE 2 DIABETES MELLITUS WITH STAGE 4 CHRONIC KIDNEY DISEASE, WITHOUT LONG-TERM CURRENT USE OF INSULIN (HCC): Primary | ICD-10-CM

## 2020-10-28 DIAGNOSIS — N18.4 TYPE 2 DIABETES MELLITUS WITH STAGE 4 CHRONIC KIDNEY DISEASE, WITHOUT LONG-TERM CURRENT USE OF INSULIN (HCC): Primary | ICD-10-CM

## 2020-10-28 DIAGNOSIS — F41.1 GENERALIZED ANXIETY DISORDER: ICD-10-CM

## 2020-10-28 DIAGNOSIS — J43.1 PANLOBULAR EMPHYSEMA (HCC): ICD-10-CM

## 2020-10-28 PROCEDURE — 80061 LIPID PANEL: CPT

## 2020-10-28 PROCEDURE — 82570 ASSAY OF URINE CREATININE: CPT

## 2020-10-28 PROCEDURE — 36415 COLL VENOUS BLD VENIPUNCTURE: CPT

## 2020-10-28 PROCEDURE — 83036 HEMOGLOBIN GLYCOSYLATED A1C: CPT

## 2020-10-28 PROCEDURE — 84439 ASSAY OF FREE THYROXINE: CPT

## 2020-10-28 PROCEDURE — 99214 OFFICE O/P EST MOD 30 MIN: CPT | Performed by: FAMILY MEDICINE

## 2020-10-28 PROCEDURE — 84443 ASSAY THYROID STIM HORMONE: CPT

## 2020-10-28 PROCEDURE — 83721 ASSAY OF BLOOD LIPOPROTEIN: CPT

## 2020-10-28 PROCEDURE — 80053 COMPREHEN METABOLIC PANEL: CPT

## 2020-10-28 PROCEDURE — 82043 UR ALBUMIN QUANTITATIVE: CPT

## 2020-10-28 RX ORDER — CLONAZEPAM 2 MG/1
TABLET ORAL
Qty: 60 TABLET | Refills: 2 | Status: SHIPPED | OUTPATIENT
Start: 2020-10-28 | End: 2021-01-04

## 2020-10-28 RX ORDER — FINASTERIDE 5 MG/1
5 TABLET, FILM COATED ORAL DAILY
Qty: 90 TABLET | Refills: 1 | Status: SHIPPED | OUTPATIENT
Start: 2020-10-28 | End: 2021-06-25

## 2020-10-29 ENCOUNTER — TELEPHONE (OUTPATIENT)
Dept: FAMILY MEDICINE CLINIC | Facility: CLINIC | Age: 75
End: 2020-10-29

## 2020-10-29 RX ORDER — FINASTERIDE 5 MG/1
TABLET, FILM COATED ORAL
Qty: 90 TABLET | Refills: 0 | OUTPATIENT
Start: 2020-10-29

## 2020-10-29 NOTE — TELEPHONE ENCOUNTER
, this sandy on 19 medications, safe to add antihistamine? I will call him. And I assume wine ok in moderation?

## 2020-10-29 NOTE — TELEPHONE ENCOUNTER
Pt states he had an appt yesterday and he forgot to tell  about how his eyes itch and water. And pt also wanted to know if he is allowed to drink wine as well. Please advise.

## 2020-10-30 ENCOUNTER — TELEPHONE (OUTPATIENT)
Dept: FAMILY MEDICINE CLINIC | Facility: CLINIC | Age: 75
End: 2020-10-30

## 2020-10-30 DIAGNOSIS — E03.9 HYPOTHYROIDISM, UNSPECIFIED TYPE: Primary | ICD-10-CM

## 2020-10-30 DIAGNOSIS — E11.22 TYPE 2 DIABETES MELLITUS WITH DIABETIC CHRONIC KIDNEY DISEASE, UNSPECIFIED CKD STAGE, UNSPECIFIED WHETHER LONG TERM INSULIN USE (HCC): ICD-10-CM

## 2020-10-30 RX ORDER — LEVOTHYROXINE SODIUM 0.1 MG/1
TABLET ORAL
Qty: 60 TABLET | Refills: 0 | Status: SHIPPED | OUTPATIENT
Start: 2020-10-30 | End: 2020-11-23

## 2020-10-30 RX ORDER — LEVOTHYROXINE SODIUM 0.2 MG/1
TABLET ORAL
Qty: 60 TABLET | Refills: 0 | Status: SHIPPED | OUTPATIENT
Start: 2020-10-30 | End: 2021-01-26

## 2020-11-22 DIAGNOSIS — E03.9 HYPOTHYROIDISM, UNSPECIFIED TYPE: ICD-10-CM

## 2020-11-23 RX ORDER — LEVOTHYROXINE SODIUM 0.1 MG/1
TABLET ORAL
Qty: 90 TABLET | Refills: 0 | Status: SHIPPED | OUTPATIENT
Start: 2020-11-23 | End: 2021-02-15

## 2020-12-09 ENCOUNTER — TELEPHONE (OUTPATIENT)
Dept: FAMILY MEDICINE CLINIC | Facility: CLINIC | Age: 75
End: 2020-12-09

## 2020-12-09 RX ORDER — GABAPENTIN 300 MG/1
300 CAPSULE ORAL 2 TIMES DAILY
Qty: 180 CAPSULE | Refills: 0 | Status: SHIPPED | OUTPATIENT
Start: 2020-12-09 | End: 2021-04-02 | Stop reason: DRUGHIGH

## 2020-12-09 NOTE — TELEPHONE ENCOUNTER
Pt's daughter [facility] (was added to [address] [place] [address] in October) called to follow up to a request that was sent in October about pt's       gabapentin 300 MG Oral Cap 270 capsule 1 8/26/2020    Sig:   TAKE 1 CAPSULE BY MOUTH DAILY in the am     Route:   (none)     Orde

## 2020-12-09 NOTE — TELEPHONE ENCOUNTER
Call to back to [facility] clarifies pt's gabapentin dose has been 300 mg capsules-pt takes one capsule twice a day. Dr adler updated.  Verbal order received for gabapentin 300 mg capsules-take one capsule po bid, #180 no PATEL Amezcua updated w this info and a

## 2020-12-09 NOTE — TELEPHONE ENCOUNTER
Call to sonia/daughter-in-law-listed on hipaa consent-sts pt has gabapentin 300 mg capsules and has been taking one capsule twice a day for at least the past year.    sts received letter from [facility] august 2020 stating gabapentin dose exceeded the rec

## 2020-12-17 RX ORDER — ALBUTEROL SULFATE 90 UG/1
2 AEROSOL, METERED RESPIRATORY (INHALATION) EVERY 4 HOURS PRN
Qty: 20.1 INHALER | Refills: 5 | Status: SHIPPED | OUTPATIENT
Start: 2020-12-17 | End: 2021-04-14 | Stop reason: CLARIF

## 2020-12-21 RX ORDER — LEVOTHYROXINE SODIUM 0.07 MG/1
TABLET ORAL
Qty: 90 TABLET | Refills: 0 | OUTPATIENT
Start: 2020-12-21

## 2020-12-31 RX ORDER — TAMSULOSIN HYDROCHLORIDE 0.4 MG/1
CAPSULE ORAL
Qty: 180 CAPSULE | Refills: 1 | Status: SHIPPED | OUTPATIENT
Start: 2020-12-31 | End: 2021-04-14 | Stop reason: CLARIF

## 2021-01-01 ENCOUNTER — TELEPHONE (OUTPATIENT)
Dept: FAMILY MEDICINE CLINIC | Facility: CLINIC | Age: 76
End: 2021-01-01

## 2021-01-03 DIAGNOSIS — F41.1 GENERALIZED ANXIETY DISORDER: ICD-10-CM

## 2021-01-04 RX ORDER — CLONAZEPAM 2 MG/1
TABLET ORAL
Qty: 60 TABLET | Refills: 2 | Status: SHIPPED | OUTPATIENT
Start: 2021-01-04 | End: 2021-03-12

## 2021-01-04 NOTE — TELEPHONE ENCOUNTER
Clonazepam is not a protocol medication. Last OV and refill of #60 + 2 was 10/28/2020. Please review and refill if appropriate.

## 2021-01-11 ENCOUNTER — VIRTUAL PHONE E/M (OUTPATIENT)
Dept: FAMILY MEDICINE CLINIC | Facility: CLINIC | Age: 76
End: 2021-01-11
Payer: MEDICARE

## 2021-01-11 DIAGNOSIS — J01.00 ACUTE NON-RECURRENT MAXILLARY SINUSITIS: Primary | ICD-10-CM

## 2021-01-11 PROCEDURE — 99214 OFFICE O/P EST MOD 30 MIN: CPT | Performed by: FAMILY MEDICINE

## 2021-01-11 RX ORDER — AMOXICILLIN AND CLAVULANATE POTASSIUM 875; 125 MG/1; MG/1
1 TABLET, FILM COATED ORAL 2 TIMES DAILY
Qty: 20 TABLET | Refills: 0 | Status: SHIPPED | OUTPATIENT
Start: 2021-01-11 | End: 2021-01-21

## 2021-01-11 RX ORDER — CODEINE PHOSPHATE AND GUAIFENESIN 10; 100 MG/5ML; MG/5ML
10 SOLUTION ORAL 4 TIMES DAILY PRN
Qty: 150 ML | Refills: 0 | Status: SHIPPED | OUTPATIENT
Start: 2021-01-11 | End: 2021-04-12 | Stop reason: ALTCHOICE

## 2021-01-11 NOTE — PROGRESS NOTES
Virtual/Telephone Check-In    Starlette Cory verbally consents to a Virtual/Telephone Check-In service on 01/11/21. Patient understands and accepts financial responsibility for any deductible, co-insurance and/or co-pays associated with this service.  Cole Sauceda Tab Take 1 tablet (40 mg total) by mouth nightly.  90 tablet 3   • Levothyroxine Sodium 200 MCG Oral Tab Take 1 tab 200 mcg+100 mcg=300 mcg total daily by mouth before breakfast. 60 tablet 0   • finasteride 5 MG Oral Tab Take 1 tablet (5 mg total) by mouth for falling     Congestive heart failure (CHF) ([address]     Essential hypertension with goal blood pressure less than 140/90     Cellulitis of lower extremity     Prostate cancer ([address]     Hyperglycemia     Type 2 diabetes mellitus with hyperglycemia, without

## 2021-01-21 ENCOUNTER — TELEPHONE (OUTPATIENT)
Dept: FAMILY MEDICINE CLINIC | Facility: CLINIC | Age: 76
End: 2021-01-21

## 2021-01-21 DIAGNOSIS — J01.00 ACUTE NON-RECURRENT MAXILLARY SINUSITIS: ICD-10-CM

## 2021-01-21 RX ORDER — AMOXICILLIN AND CLAVULANATE POTASSIUM 875; 125 MG/1; MG/1
1 TABLET, FILM COATED ORAL 2 TIMES DAILY
Qty: 20 TABLET | Refills: 0 | Status: SHIPPED | OUTPATIENT
Start: 2021-01-21 | End: 2021-01-25

## 2021-01-21 NOTE — TELEPHONE ENCOUNTER
Pt calling with update on cough, still congested, cough is better, feeling a lot better, cough med RX upset his stomach, pt taking OTC cough med now, pt denies fever and wants refill on Amoxicillin Pot clavulanate finished course today to make sure he is a

## 2021-01-21 NOTE — TELEPHONE ENCOUNTER
Pt notified Dr. Pretty Chapin prescribed another 10 days of the Augmetnin. Pt was advised to take an OTC probiotic when taking the antibiotic. rx sent to pharmacy. Pt agreed to plan and verbalized understanding.

## 2021-01-22 DIAGNOSIS — J01.00 ACUTE NON-RECURRENT MAXILLARY SINUSITIS: ICD-10-CM

## 2021-01-25 DIAGNOSIS — Z23 NEED FOR VACCINATION: ICD-10-CM

## 2021-01-25 RX ORDER — AMOXICILLIN AND CLAVULANATE POTASSIUM 875; 125 MG/1; MG/1
TABLET, FILM COATED ORAL
Qty: 20 TABLET | Refills: 0 | Status: SHIPPED | OUTPATIENT
Start: 2021-01-25 | End: 2021-04-12 | Stop reason: ALTCHOICE

## 2021-01-26 DIAGNOSIS — E03.9 HYPOTHYROIDISM, UNSPECIFIED TYPE: ICD-10-CM

## 2021-01-26 RX ORDER — LEVOTHYROXINE SODIUM 0.2 MG/1
TABLET ORAL
Qty: 30 TABLET | Refills: 2 | Status: SHIPPED | OUTPATIENT
Start: 2021-01-26 | End: 2021-04-14 | Stop reason: CLARIF

## 2021-01-29 ENCOUNTER — TELEPHONE (OUTPATIENT)
Dept: FAMILY MEDICINE CLINIC | Facility: CLINIC | Age: 76
End: 2021-01-29

## 2021-01-29 ENCOUNTER — TELEMEDICINE (OUTPATIENT)
Dept: FAMILY MEDICINE CLINIC | Facility: CLINIC | Age: 76
End: 2021-01-29

## 2021-01-29 DIAGNOSIS — L97.921 SKIN ULCER OF LEFT LOWER LEG, LIMITED TO BREAKDOWN OF SKIN (HCC): ICD-10-CM

## 2021-01-29 DIAGNOSIS — R05.9 COUGH: ICD-10-CM

## 2021-01-29 DIAGNOSIS — L03.116 LEFT LEG CELLULITIS: Primary | ICD-10-CM

## 2021-01-29 PROCEDURE — 99214 OFFICE O/P EST MOD 30 MIN: CPT | Performed by: FAMILY MEDICINE

## 2021-01-29 RX ORDER — CLINDAMYCIN HYDROCHLORIDE 300 MG/1
300 CAPSULE ORAL 3 TIMES DAILY
Qty: 30 CAPSULE | Refills: 0 | Status: SHIPPED | OUTPATIENT
Start: 2021-01-29 | End: 2021-02-08

## 2021-01-29 NOTE — TELEPHONE ENCOUNTER
Woke up this am   Left lower leg, redness, open wound \"silver dollar sized\"  Leg always swollen  No drainage  No trauma  No fever     Offered opening this pm but refused  Unable to go to  as well   No transportation   He said if can order [facility] again   Me

## 2021-01-29 NOTE — TELEPHONE ENCOUNTER
Pt states his leg cellulitis is back. He would like to know how he can get a home health nurse to come and treat him. Please advise.  Thank you

## 2021-01-29 NOTE — TELEPHONE ENCOUNTER
Pt son states before he has appt  He wants Dr Marce Westfall to know the spot on his leg is smelly and weeping and huge and dark. He also has a wet cough and spits dark mucous in a cup by his chair.  He is going to try and be there at the call but pt may push him neo

## 2021-01-31 NOTE — PROGRESS NOTES
Subjective     HPI:   Francisco Javier Dow verbally consents to a Virtual/Telephone Check-In service on 01/31/21. Patient understands and accepts financial responsibility for any deductible, co-insurance and/or co-pays associated with this service.  This visit

## 2021-02-02 ENCOUNTER — TELEPHONE (OUTPATIENT)
Dept: FAMILY MEDICINE CLINIC | Facility: CLINIC | Age: 76
End: 2021-02-02

## 2021-02-02 NOTE — TELEPHONE ENCOUNTER
Left voice message for patient to call back to schedule a Medicare Annual Wellness visit.     Last Medicare Annual date:  2019

## 2021-02-14 DIAGNOSIS — E03.9 HYPOTHYROIDISM, UNSPECIFIED TYPE: ICD-10-CM

## 2021-02-15 RX ORDER — LEVOTHYROXINE SODIUM 0.1 MG/1
TABLET ORAL
Qty: 90 TABLET | Refills: 0 | Status: SHIPPED | OUTPATIENT
Start: 2021-02-15 | End: 2021-04-14 | Stop reason: CLARIF

## 2021-02-17 ENCOUNTER — IMMUNIZATION (OUTPATIENT)
Dept: LAB | Age: 76
End: 2021-02-17
Attending: HOSPITALIST
Payer: MEDICARE

## 2021-02-17 DIAGNOSIS — Z23 NEED FOR VACCINATION: Primary | ICD-10-CM

## 2021-02-17 PROCEDURE — 0001A SARSCOV2 VAC 30MCG/0.3ML IM: CPT

## 2021-03-01 RX ORDER — LEVOTHYROXINE SODIUM 0.07 MG/1
TABLET ORAL
Qty: 90 TABLET | Refills: 0 | OUTPATIENT
Start: 2021-03-01

## 2021-03-08 DIAGNOSIS — E11.22 TYPE 2 DIABETES MELLITUS WITH DIABETIC CHRONIC KIDNEY DISEASE, UNSPECIFIED CKD STAGE, UNSPECIFIED WHETHER LONG TERM INSULIN USE (HCC): Primary | ICD-10-CM

## 2021-03-09 ENCOUNTER — MED REC SCAN ONLY (OUTPATIENT)
Dept: FAMILY MEDICINE CLINIC | Facility: CLINIC | Age: 76
End: 2021-03-09

## 2021-03-09 NOTE — TELEPHONE ENCOUNTER
Please call pt to schedule med check with Dr. Gabrielle Drake. Last med check: 10/28/2020 asked to return in 3 months    Thank you.

## 2021-03-10 ENCOUNTER — IMMUNIZATION (OUTPATIENT)
Dept: LAB | Age: 76
End: 2021-03-10
Attending: HOSPITALIST
Payer: MEDICARE

## 2021-03-10 DIAGNOSIS — Z23 NEED FOR VACCINATION: Primary | ICD-10-CM

## 2021-03-10 PROCEDURE — 0002A SARSCOV2 VAC 30MCG/0.3ML IM: CPT

## 2021-03-12 RX ORDER — GLIPIZIDE 10 MG/1
TABLET ORAL
Qty: 90 TABLET | Refills: 0 | Status: SHIPPED | OUTPATIENT
Start: 2021-03-12 | End: 2021-04-14 | Stop reason: CLARIF

## 2021-03-15 PROBLEM — E11.22 TYPE 2 DIABETES MELLITUS WITH STAGE 4 CHRONIC KIDNEY DISEASE, WITHOUT LONG-TERM CURRENT USE OF INSULIN (HCC): Status: ACTIVE | Noted: 2017-05-10

## 2021-03-15 PROBLEM — N18.4 TYPE 2 DIABETES MELLITUS WITH STAGE 4 CHRONIC KIDNEY DISEASE, WITHOUT LONG-TERM CURRENT USE OF INSULIN (HCC): Status: ACTIVE | Noted: 2017-05-10

## 2021-03-15 NOTE — PROGRESS NOTES
Virtual/Telephone Check-In    Geryl Lipoma verbally consents to a Virtual/Telephone Check-In service on 03/15/21. Patient understands and accepts financial responsibility for any deductible, co-insurance and/or co-pays associated with this service.  Mani Mcfarlane performed and is otherwise negative and/or non-contributory, except as described above.     Current Outpatient Medications   Medication Sig Dispense Refill   • Blood Glucose Monitoring Suppl ([employer]) w/Device Does not apply Kit Test BS onc nightly. 90 tablet 3   • finasteride 5 MG Oral Tab Take 1 tablet (5 mg total) by mouth daily.  90 tablet 1   • spironolactone 50 MG Oral Tab TAKE 1 TABLET ONCE DAILY 90 tablet 3   • CLOPIDOGREL BISULFATE 75 MG Oral Tab TAKE 1 TABLET DAILY 90 tablet 3   • Me (chronic kidney disease), stage IV (HCC)     Right heart failure (HCC)     Panlobular emphysema (HCC)     Type 2 diabetes mellitus with stage 4 chronic kidney disease, without long-term current use of insulin (HCC)     Chest pain, atypical     Elevated tro Guidelines.   TRISTAN POZO, DO

## 2021-03-17 ENCOUNTER — TELEPHONE (OUTPATIENT)
Dept: FAMILY MEDICINE CLINIC | Facility: CLINIC | Age: 76
End: 2021-03-17

## 2021-03-17 RX ORDER — BLOOD SUGAR DIAGNOSTIC
STRIP MISCELLANEOUS
Qty: 100 STRIP | Refills: 3 | Status: CANCELLED | OUTPATIENT
Start: 2021-03-17 | End: 2022-03-17

## 2021-03-17 NOTE — TELEPHONE ENCOUNTER
Pt states he was told to call back with blood sugar meter price. The meter is $0 but the strips are $137. Thank you.

## 2021-03-17 NOTE — TELEPHONE ENCOUNTER
I called [facility] pharmacy on this. They said they told pt they need his medicare part B card in order to bill medicare to determine cost. He is supposed to bring the card back to him.  They are not able to tell me much in regards to cost until he brings that c

## 2021-03-17 NOTE — TELEPHONE ENCOUNTER
Per pharmacy pt has not brought back medicare part b card to process strips. If pt calls back, we can refer him to [employer] if it turns out cost is high even with his medicare part b card.

## 2021-03-18 ENCOUNTER — TELEPHONE (OUTPATIENT)
Dept: FAMILY MEDICINE CLINIC | Facility: CLINIC | Age: 76
End: 2021-03-18

## 2021-03-18 NOTE — TELEPHONE ENCOUNTER
Sudeep Freire called from [facility]. Pt was discharged from home care with all goals met.

## 2021-03-25 ENCOUNTER — TELEPHONE (OUTPATIENT)
Dept: FAMILY MEDICINE CLINIC | Facility: CLINIC | Age: 76
End: 2021-03-25

## 2021-03-25 NOTE — TELEPHONE ENCOUNTER
Juliette nurse from [facility] visited pt today and found paramedics leaving as pt was treated for a fall, did not hit his head but has 2 skin tears left forearm, Juliette treated and dressed.  Reg Richards would like orders now for continue nursing visits 2

## 2021-03-29 ENCOUNTER — TELEPHONE (OUTPATIENT)
Dept: FAMILY MEDICINE CLINIC | Facility: CLINIC | Age: 76
End: 2021-03-29

## 2021-03-29 DIAGNOSIS — R29.6 FALLS FREQUENTLY: Primary | ICD-10-CM

## 2021-03-29 DIAGNOSIS — R41.89 COGNITIVE DECLINE: ICD-10-CM

## 2021-03-29 NOTE — TELEPHONE ENCOUNTER
I can see him in the office but sounds like he needs neuro evaluation and home health. Ok to refer to Dr. Betito Mortensen from neuro and order home health for PT and OT for evaluation given several falls.   Please schedule 30 min appt with me on Wednesday or [email or web address]

## 2021-03-29 NOTE — TELEPHONE ENCOUNTER
Spoke with pt's Daughter-n-Law and given recommendations and she voiced understanding. Given Dr. Jan Link. , appt 4/1/2021 and order for Ángel Gonzales faxed to [phone].

## 2021-03-29 NOTE — TELEPHONE ENCOUNTER
Pt son would like to speak with a nurse. He states pt has had some cognitive changes. He has had several falls has not bathed in a month. He is more forgetful and more upset. Son is worried about pt remembering to take meds.  He will not let anyone help him

## 2021-03-29 NOTE — TELEPHONE ENCOUNTER
Pt.s son Dallas Harvey called. ( he is not on his fathers HIPPA) he wanted you to be aware his father is having a significant decline cognitively. He has neuropathy and cellulitis in his feet. He is only bathing about 1 time a month and has terrible body odor.  H

## 2021-03-31 ENCOUNTER — TELEPHONE (OUTPATIENT)
Dept: FAMILY MEDICINE CLINIC | Facility: CLINIC | Age: 76
End: 2021-03-31

## 2021-03-31 NOTE — TELEPHONE ENCOUNTER
Call from [facility] dr nayely espinoza will be there for his appt tomorrow. He was going to cancel appt. I told him if he cancels appt, i'm calling dr adler for a serious discussion. \"  Also sts pt fell again last night-\"want dr love to take a good

## 2021-04-01 ENCOUNTER — OFFICE VISIT (OUTPATIENT)
Dept: FAMILY MEDICINE CLINIC | Facility: CLINIC | Age: 76
End: 2021-04-01
Payer: MEDICARE

## 2021-04-01 VITALS
HEIGHT: 72 IN | BODY MASS INDEX: 42.66 KG/M2 | SYSTOLIC BLOOD PRESSURE: 120 MMHG | WEIGHT: 315 LBS | TEMPERATURE: 98 F | OXYGEN SATURATION: 98 % | HEART RATE: 95 BPM | DIASTOLIC BLOOD PRESSURE: 60 MMHG | RESPIRATION RATE: 18 BRPM

## 2021-04-01 DIAGNOSIS — R29.6 FREQUENT FALLS: ICD-10-CM

## 2021-04-01 DIAGNOSIS — R41.89 COGNITIVE DECLINE: ICD-10-CM

## 2021-04-01 DIAGNOSIS — E11.40 UNCONTROLLED TYPE 2 DIABETES WITH NEUROPATHY (HCC): ICD-10-CM

## 2021-04-01 DIAGNOSIS — E11.65 UNCONTROLLED TYPE 2 DIABETES WITH NEUROPATHY (HCC): ICD-10-CM

## 2021-04-01 DIAGNOSIS — R53.1 GENERALIZED WEAKNESS: Primary | ICD-10-CM

## 2021-04-01 PROCEDURE — 99214 OFFICE O/P EST MOD 30 MIN: CPT | Performed by: FAMILY MEDICINE

## 2021-04-02 ENCOUNTER — TELEPHONE (OUTPATIENT)
Dept: FAMILY MEDICINE CLINIC | Facility: CLINIC | Age: 76
End: 2021-04-02

## 2021-04-02 RX ORDER — GABAPENTIN 300 MG/1
300 CAPSULE ORAL 3 TIMES DAILY
Qty: 90 CAPSULE | Refills: 0 | Status: SHIPPED | OUTPATIENT
Start: 2021-04-02 | End: 2021-04-12

## 2021-04-02 NOTE — TELEPHONE ENCOUNTER
Per Dr. Vero Kincaid to let pt's son Karyn Andrea) know per pt's Cardiologist to have pt discontinue Plavix due to high risk of falls. Pt's son voiced understanding. Also request RX for Gabapentin 300 mg in AM and 600 mg PM [facility] [address] [facility].     Per

## 2021-04-02 NOTE — PROGRESS NOTES
[facility] Family Medicine Office Note  Chief Complaint:   Patient presents with:  Fall: frequent/back pain-can't stand more than 20 secs.    Memory Loss  Tremors: needs OT/PT      HPI:   This is a 67year old male coming in for several issues and Coronary disease     s/p LAD stent   • Depression    • Dysuria 11/5/07    w/burning   • Echocardiogram abnormal 9/7/07    technically difficult study d/t anatomic reasons, mild inferior basilar hypokinesia, mild concentric left ventricular hypertrophy, dec coronary artery stent   • OTHER SURGICAL HISTORY      rt cataract   • ROBOT-ASSISTED LAPAROSCOPIC VENTRAL HERNIA REPAIR N/A 3/9/2017    Performed by Pierre Clancy MD at [facility] MAIN OR   • THYROIDECTOMY     • THYROIDECTOMY N/A 6/20/2013    Performed by Gisel Dawkins CLAVULANATE 875-125 MG Oral Tab TAKE 1 TABLET BY MOUTH TWICE A DAY FOR 10 DAYS 20 tablet 0   • guaiFENesin-codeine (CHERATUSSIN AC) 100-10 MG/5ML Oral Solution Take 10 mL by mouth 4 (four) times daily as needed for cough.  150 mL 0   • tamsulosin (FLOMAX) c or tingling in the extremities.  + cognitive decline  MUSCULOSKELETAL:  + bilateral knee pain    EXAM:   /60 (BP Location: Right arm, Patient Position: Sitting, Cuff Size: large)   Pulse 95   Temp 98 °F (36.7 °C) (Skin)   Resp 18   Ht 6' (1.829 m)   W or ordered in this encounter       Health Maintenance:  Diabetes Care Dilated Eye Exam Never done  Zoster Vaccines(1 of 2) Never done  Pneumococcal Vaccine: 65+ Years(1 of 1 - PPSV23) Never done  Tobacco Cessation Counseling 2 Years due on 09/16/2018  [facility] Hypothyroidism     Class 2 obesity with serious comorbidity and body mass index (BMI) of 38.0 to 38.9 in adult     Venous insufficiency     Peripheral edema     Acute renal insufficiency     Ventral hernia without obstruction or gangrene     Urinary retent

## 2021-04-09 ENCOUNTER — TELEPHONE (OUTPATIENT)
Dept: FAMILY MEDICINE CLINIC | Facility: CLINIC | Age: 76
End: 2021-04-09

## 2021-04-09 NOTE — TELEPHONE ENCOUNTER
No call back from ric PT/[facility]  Call to dawn/spouse-advised of call info from ric PT [facility], explained term cellulitis and provided instructions noted below from dr adler.    Yan Blank did not know pt refused [facility] eval-voices understanding, agrees w plan, no

## 2021-04-09 NOTE — TELEPHONE ENCOUNTER
Call back to ric PT/THEODORAH reaches voice mail. Left vmm req call back to triage nurse today. Provided ofc phone hours.

## 2021-04-09 NOTE — TELEPHONE ENCOUNTER
If he is developing worsening cellulitis, he should go to immediate care for antibiotics to prevent hospitalization.

## 2021-04-09 NOTE — TELEPHONE ENCOUNTER
Call from [address] [place] [address]  Initial care for PT sched today   Dx LLL Cellulitis but pt refused  [facility]

## 2021-04-12 ENCOUNTER — OFFICE VISIT (OUTPATIENT)
Dept: FAMILY MEDICINE CLINIC | Facility: CLINIC | Age: 76
End: 2021-04-12
Payer: MEDICARE

## 2021-04-12 VITALS
HEIGHT: 72 IN | DIASTOLIC BLOOD PRESSURE: 70 MMHG | SYSTOLIC BLOOD PRESSURE: 130 MMHG | BODY MASS INDEX: 42.66 KG/M2 | HEART RATE: 100 BPM | RESPIRATION RATE: 18 BRPM | WEIGHT: 315 LBS

## 2021-04-12 DIAGNOSIS — L03.116 LEFT LEG CELLULITIS: Primary | ICD-10-CM

## 2021-04-12 PROCEDURE — 99214 OFFICE O/P EST MOD 30 MIN: CPT | Performed by: FAMILY MEDICINE

## 2021-04-12 RX ORDER — CLINDAMYCIN HYDROCHLORIDE 300 MG/1
300 CAPSULE ORAL 3 TIMES DAILY
Qty: 42 CAPSULE | Refills: 0 | Status: ON HOLD | OUTPATIENT
Start: 2021-04-12 | End: 2021-04-26

## 2021-04-12 RX ORDER — GABAPENTIN 300 MG/1
CAPSULE ORAL
Qty: 180 CAPSULE | Refills: 0 | Status: SHIPPED | OUTPATIENT
Start: 2021-04-12 | End: 2021-04-14 | Stop reason: CLARIF

## 2021-04-12 NOTE — PROGRESS NOTES
[facility] Family Medicine Office Note  Chief Complaint:   No chief complaint on file. HPI:   This is a 67year old male coming in for left foot pain along with redness and swelling. Patient has a history of cellulitis in the same foot.  He s Leukocytosis 11/09/2007   • Malignant neoplasm of prostate ([facility] [address] 3/26/2012    pt does not want radiation   • Metabolic syndrome    • Myocardial infarct ([facility] [address] 9/07    w/3 vessel coronary artery disease, s/ stent x3 and RCA.    • Obese    • BRITNEY on CPAP    • P standard drinks    Drug use: No    Family History:  Family History   Problem Relation Age of Onset   • Heart Attack Father    • Other (bone cancer) Mother      Allergies:     Ace Inhibitors          Coughing  Current Meds:  Current Outpatient Medications 50 MG Oral Tablet 24 Hr Take 1 tablet (50 mg total) by mouth once daily. 90 tablet 3   • aspirin 81 MG Oral Tab EC Take 1 tablet (81 mg total) by mouth daily.  100 tablet 3   • Blood Glucose Monitoring Suppl (Mosley Howard IQ SYSTEM) w/Device Does not appl groomed.   Physical Exam:  GEN:  Patient is alert and oriented x3, no apparent distress  HEAD:  Normocephalic, atraumatic  LUNGS: clear to auscultation bilaterally, no rales/rhonchi/wheezing  HEART:  Regular rate and rhythm, no murmurs, rubs or gallops  ABD is to call with any side effects or complications from the treatments as a result of today.      Problem List:  Patient Active Problem List:     Malignant neoplasm of prostate ([facility] [address]     Essential hypertension, benign     DM type 2 (diabetes mellitus, type 2 Influenza     Generalized anxiety disorder      TRISTAN POZO, DO    Please note that portions of this note may have been completed with a voice recognition program. Efforts were made to edit the dictations but occasionally words are mis-transcribed.

## 2021-04-14 ENCOUNTER — HOSPITAL ENCOUNTER (INPATIENT)
Facility: HOSPITAL | Age: 76
LOS: 11 days | Discharge: NURSING, REHAB OR LONG-TERM CARE | DRG: 617 | End: 2021-04-27
Attending: EMERGENCY MEDICINE | Admitting: HOSPITALIST
Payer: MEDICARE

## 2021-04-14 ENCOUNTER — APPOINTMENT (OUTPATIENT)
Dept: GENERAL RADIOLOGY | Facility: HOSPITAL | Age: 76
DRG: 617 | End: 2021-04-14
Attending: EMERGENCY MEDICINE
Payer: MEDICARE

## 2021-04-14 DIAGNOSIS — E87.1 HYPONATREMIA: ICD-10-CM

## 2021-04-14 DIAGNOSIS — E03.9 HYPOTHYROIDISM, UNSPECIFIED: ICD-10-CM

## 2021-04-14 DIAGNOSIS — R73.9 HYPERGLYCEMIA: ICD-10-CM

## 2021-04-14 DIAGNOSIS — L03.116 CELLULITIS OF LEFT LOWER EXTREMITY: Primary | ICD-10-CM

## 2021-04-14 PROCEDURE — 5A09357 ASSISTANCE WITH RESPIRATORY VENTILATION, LESS THAN 24 CONSECUTIVE HOURS, CONTINUOUS POSITIVE AIRWAY PRESSURE: ICD-10-PCS | Performed by: HOSPITALIST

## 2021-04-14 PROCEDURE — 73630 X-RAY EXAM OF FOOT: CPT | Performed by: EMERGENCY MEDICINE

## 2021-04-14 PROCEDURE — 99220 INITIAL OBSERVATION CARE,LEVL III: CPT | Performed by: HOSPITALIST

## 2021-04-14 RX ORDER — ATORVASTATIN CALCIUM 20 MG/1
20 TABLET, FILM COATED ORAL NIGHTLY
Status: DISCONTINUED | OUTPATIENT
Start: 2021-04-14 | End: 2021-04-27

## 2021-04-14 RX ORDER — HYDROMORPHONE HYDROCHLORIDE 1 MG/ML
0.5 INJECTION, SOLUTION INTRAMUSCULAR; INTRAVENOUS; SUBCUTANEOUS EVERY 30 MIN PRN
Status: ACTIVE | OUTPATIENT
Start: 2021-04-14 | End: 2021-04-14

## 2021-04-14 RX ORDER — TAMSULOSIN HYDROCHLORIDE 0.4 MG/1
0.8 CAPSULE ORAL DAILY
Status: DISCONTINUED | OUTPATIENT
Start: 2021-04-14 | End: 2021-04-27

## 2021-04-14 RX ORDER — ONDANSETRON 2 MG/ML
4 INJECTION INTRAMUSCULAR; INTRAVENOUS EVERY 6 HOURS PRN
Status: DISCONTINUED | OUTPATIENT
Start: 2021-04-14 | End: 2021-04-27

## 2021-04-14 RX ORDER — MELATONIN
3 NIGHTLY PRN
Status: DISCONTINUED | OUTPATIENT
Start: 2021-04-14 | End: 2021-04-27

## 2021-04-14 RX ORDER — FINASTERIDE 5 MG/1
5 TABLET, FILM COATED ORAL DAILY
Status: DISCONTINUED | OUTPATIENT
Start: 2021-04-14 | End: 2021-04-27

## 2021-04-14 RX ORDER — GABAPENTIN 300 MG/1
300 CAPSULE ORAL 3 TIMES DAILY
COMMUNITY
End: 2021-07-01 | Stop reason: DRUGHIGH

## 2021-04-14 RX ORDER — LEVOTHYROXINE SODIUM 0.15 MG/1
300 TABLET ORAL
Status: DISCONTINUED | OUTPATIENT
Start: 2021-04-14 | End: 2021-04-27

## 2021-04-14 RX ORDER — TORSEMIDE 20 MG/1
60 TABLET ORAL DAILY
Status: DISCONTINUED | OUTPATIENT
Start: 2021-04-14 | End: 2021-04-27

## 2021-04-14 RX ORDER — POTASSIUM CHLORIDE 20 MEQ/1
20 TABLET, EXTENDED RELEASE ORAL DAILY
Status: ON HOLD | COMMUNITY
End: 2021-04-26

## 2021-04-14 RX ORDER — ONDANSETRON 2 MG/ML
4 INJECTION INTRAMUSCULAR; INTRAVENOUS EVERY 4 HOURS PRN
Status: DISCONTINUED | OUTPATIENT
Start: 2021-04-14 | End: 2021-04-14

## 2021-04-14 RX ORDER — DOCUSATE SODIUM 100 MG/1
100 CAPSULE, LIQUID FILLED ORAL 2 TIMES DAILY
Status: DISCONTINUED | OUTPATIENT
Start: 2021-04-14 | End: 2021-04-27

## 2021-04-14 RX ORDER — SODIUM PHOSPHATE, DIBASIC AND SODIUM PHOSPHATE, MONOBASIC 7; 19 G/133ML; G/133ML
1 ENEMA RECTAL ONCE AS NEEDED
Status: DISCONTINUED | OUTPATIENT
Start: 2021-04-14 | End: 2021-04-27

## 2021-04-14 RX ORDER — MORPHINE SULFATE 2 MG/ML
2 INJECTION, SOLUTION INTRAMUSCULAR; INTRAVENOUS EVERY 2 HOUR PRN
Status: DISCONTINUED | OUTPATIENT
Start: 2021-04-14 | End: 2021-04-27

## 2021-04-14 RX ORDER — LOSARTAN POTASSIUM 100 MG/1
100 TABLET ORAL DAILY
Status: DISCONTINUED | OUTPATIENT
Start: 2021-04-14 | End: 2021-04-27

## 2021-04-14 RX ORDER — METOCLOPRAMIDE HYDROCHLORIDE 5 MG/ML
5 INJECTION INTRAMUSCULAR; INTRAVENOUS EVERY 8 HOURS PRN
Status: DISCONTINUED | OUTPATIENT
Start: 2021-04-14 | End: 2021-04-27

## 2021-04-14 RX ORDER — SPIRONOLACTONE 50 MG/1
50 TABLET, FILM COATED ORAL DAILY
COMMUNITY
End: 2021-10-25

## 2021-04-14 RX ORDER — SPIRONOLACTONE 25 MG/1
50 TABLET ORAL DAILY
Status: DISCONTINUED | OUTPATIENT
Start: 2021-04-14 | End: 2021-04-27

## 2021-04-14 RX ORDER — GABAPENTIN 300 MG/1
300 CAPSULE ORAL 2 TIMES DAILY
Status: DISCONTINUED | OUTPATIENT
Start: 2021-04-14 | End: 2021-04-14

## 2021-04-14 RX ORDER — CLONAZEPAM 1 MG/1
2 TABLET ORAL 3 TIMES DAILY
Status: DISCONTINUED | OUTPATIENT
Start: 2021-04-14 | End: 2021-04-20

## 2021-04-14 RX ORDER — GLIPIZIDE 10 MG/1
10 TABLET ORAL DAILY
Status: ON HOLD | COMMUNITY
End: 2021-04-26

## 2021-04-14 RX ORDER — SPIRONOLACTONE 25 MG/1
25 TABLET ORAL DAILY
Status: DISCONTINUED | OUTPATIENT
Start: 2021-04-14 | End: 2021-04-14

## 2021-04-14 RX ORDER — ALBUTEROL SULFATE 90 UG/1
2 AEROSOL, METERED RESPIRATORY (INHALATION) EVERY 4 HOURS PRN
Status: DISCONTINUED | OUTPATIENT
Start: 2021-04-14 | End: 2021-04-27

## 2021-04-14 RX ORDER — BISACODYL 10 MG
10 SUPPOSITORY, RECTAL RECTAL
Status: DISCONTINUED | OUTPATIENT
Start: 2021-04-14 | End: 2021-04-27

## 2021-04-14 RX ORDER — DEXTROSE MONOHYDRATE 25 G/50ML
50 INJECTION, SOLUTION INTRAVENOUS
Status: DISCONTINUED | OUTPATIENT
Start: 2021-04-14 | End: 2021-04-27

## 2021-04-14 RX ORDER — CLONAZEPAM 1 MG/1
1 TABLET ORAL 3 TIMES DAILY
Status: DISCONTINUED | OUTPATIENT
Start: 2021-04-14 | End: 2021-04-14

## 2021-04-14 RX ORDER — ACETAMINOPHEN 325 MG/1
650 TABLET ORAL EVERY 6 HOURS PRN
Status: ON HOLD | COMMUNITY
End: 2021-04-26

## 2021-04-14 RX ORDER — METOPROLOL SUCCINATE 50 MG/1
50 TABLET, EXTENDED RELEASE ORAL
Status: DISCONTINUED | OUTPATIENT
Start: 2021-04-14 | End: 2021-04-27

## 2021-04-14 RX ORDER — POLYETHYLENE GLYCOL 3350 17 G/17G
17 POWDER, FOR SOLUTION ORAL DAILY PRN
Status: DISCONTINUED | OUTPATIENT
Start: 2021-04-14 | End: 2021-04-27

## 2021-04-14 RX ORDER — INSULIN ASPART 100 [IU]/ML
0.1 INJECTION, SOLUTION INTRAVENOUS; SUBCUTANEOUS ONCE
Status: COMPLETED | OUTPATIENT
Start: 2021-04-14 | End: 2021-04-14

## 2021-04-14 RX ORDER — LEVOTHYROXINE SODIUM 0.2 MG/1
200 TABLET ORAL
Status: ON HOLD | COMMUNITY
End: 2021-04-22

## 2021-04-14 RX ORDER — GABAPENTIN 300 MG/1
300 CAPSULE ORAL 3 TIMES DAILY
Status: DISCONTINUED | OUTPATIENT
Start: 2021-04-14 | End: 2021-04-27

## 2021-04-14 RX ORDER — HEPARIN SODIUM 5000 [USP'U]/ML
5000 INJECTION, SOLUTION INTRAVENOUS; SUBCUTANEOUS EVERY 8 HOURS SCHEDULED
Status: DISCONTINUED | OUTPATIENT
Start: 2021-04-14 | End: 2021-04-27

## 2021-04-14 RX ORDER — ASPIRIN 81 MG/1
81 TABLET ORAL DAILY
Status: DISCONTINUED | OUTPATIENT
Start: 2021-04-14 | End: 2021-04-27

## 2021-04-14 RX ORDER — CLOPIDOGREL BISULFATE 75 MG/1
75 TABLET ORAL DAILY
COMMUNITY
End: 2021-04-14 | Stop reason: CLARIF

## 2021-04-14 RX ORDER — LEVOTHYROXINE SODIUM 0.1 MG/1
100 TABLET ORAL
Status: ON HOLD | COMMUNITY
End: 2021-04-27

## 2021-04-14 RX ORDER — ALBUTEROL SULFATE 90 UG/1
2 AEROSOL, METERED RESPIRATORY (INHALATION) EVERY 6 HOURS PRN
COMMUNITY

## 2021-04-14 RX ORDER — TORSEMIDE 20 MG/1
60 TABLET ORAL DAILY
COMMUNITY
End: 2021-05-11

## 2021-04-14 RX ORDER — MORPHINE SULFATE 2 MG/ML
1 INJECTION, SOLUTION INTRAMUSCULAR; INTRAVENOUS EVERY 2 HOUR PRN
Status: DISCONTINUED | OUTPATIENT
Start: 2021-04-14 | End: 2021-04-27

## 2021-04-14 RX ORDER — BUDESONIDE AND FORMOTEROL FUMARATE DIHYDRATE 160; 4.5 UG/1; UG/1
2 AEROSOL RESPIRATORY (INHALATION) 2 TIMES DAILY
COMMUNITY
End: 2021-10-29 | Stop reason: CLARIF

## 2021-04-14 RX ORDER — MORPHINE SULFATE 4 MG/ML
4 INJECTION, SOLUTION INTRAMUSCULAR; INTRAVENOUS EVERY 2 HOUR PRN
Status: DISCONTINUED | OUTPATIENT
Start: 2021-04-14 | End: 2021-04-27

## 2021-04-14 RX ORDER — ECHINACEA PURPUREA EXTRACT 125 MG
1 TABLET ORAL AS NEEDED
Status: ON HOLD | COMMUNITY
End: 2021-04-26

## 2021-04-14 RX ORDER — TAMSULOSIN HYDROCHLORIDE 0.4 MG/1
0.8 CAPSULE ORAL DAILY
COMMUNITY
End: 2021-08-30

## 2021-04-14 RX ORDER — CLOPIDOGREL BISULFATE 75 MG/1
75 TABLET ORAL DAILY
Status: DISCONTINUED | OUTPATIENT
Start: 2021-04-14 | End: 2021-04-14

## 2021-04-14 NOTE — PROGRESS NOTES
[facility] Pharmacy Note:  Renal Adjustment for ampicillin/sulbactam (UNASYN)    Milagro Estrella is a 67year old patient who has been prescribed ampicillin/sulbactam (UNASYN) 1.5 g every 6 hrs.   The estimated creatinine clearance is 33.2 mL/min (A) (based on SC

## 2021-04-14 NOTE — TELEPHONE ENCOUNTER
Pt wife calling, stating that the patient's left foot and leg is still not better. The patient's wife is taking him to the ER currently. As FABRICIO

## 2021-04-14 NOTE — PROGRESS NOTES
[facility] Pharmacy Note:  Renal Dose Adjustment for Metoclopramide (REGLAN)    Beatriz Nails has been prescribed Metoclopramide (REGLAN) 10 mg every 8 hours as needed for nausea, vomiting. Estimated Creatinine Clearance: 33.2 mL/min (A) (based on SCr of 2.

## 2021-04-14 NOTE — ED PROVIDER NOTES
Patient Seen in: [facility] Emergency Department      History   Patient presents with:  Cellulitis    Stated Complaint: Cellulitis of left toes.      HPI/Subjective:   HPI    54-year-old male who is a known diabetic and was seen by his primary care p Elevated lipids    • Elevated PSA    • Enlarged thyroid    • Essential hypertension    • Essential hypertension, benign 3/26/2012   • Gallstone 11/08/07    on ct small stone   • Heart attack [facility])    • Hemorrhoids    • High blood pressure    • High cholest at [facility] MAIN OR                Social History    Tobacco Use      Smoking status: Current Every Day Smoker        Packs/day: 0.25        Years: 45.00        Pack years: 11.25        Types: Cigarettes        Start date: 1/8/1961      Smokeless tobacco: Never Findings: Erythema present. Comments: Poor foot hygiene overall, superficial ulceration on the dorsum of the second toe on the left. Erythema and swelling particularly of the foot extending up to just below the knee.  Please see pictures below   Ne RAINBOW DRAW LAVENDER   RAINBOW DRAW LIGHT GREEN   RAINBOW DRAW GOLD   BLOOD CULTURE   BLOOD CULTURE          XR FOOT, COMPLETE (MIN 3 VIEWS), LEFT (CPT=73630)    Result Date: 4/14/2021  PROCEDURE:  XR FOOT, COMPLETE (MIN 3 VIEWS), LEFT (CPT=73630)  TECHNI Hyperglycemia     Disposition:  Admit  4/14/2021  3:33 pm    Follow-up:  No follow-up provider specified.         Medications Prescribed:  Current Discharge Medication List                          Hospital Problems     Present on Admission  Date Reviewed:

## 2021-04-14 NOTE — H&P
[facility]  History and Physical     Jerald Lamar Patient Status:  Emergency    1945 MRN [id number]   Location [address] [place] [address] Attending Amish Genao MD   Hosp Day # 0 [facility]     Chief Compl hypertrophy, decreased left ventricular compliance   • Elevated lipids    • Elevated PSA    • Enlarged thyroid    • Essential hypertension    • Essential hypertension, benign 3/26/2012   • Gallstone 11/08/07    on ct small stone   • Heart attack [facility])    • THYROIDECTOMY N/A 6/20/2013    Performed by Randee Waldrop MD at [facility] MAIN OR       Social History:  reports that he has been smoking cigarettes. He started smoking about 60 years ago. He has a 11.25 pack-year smoking history.  He has never used smokeles 0  Levothyroxine Sodium 200 MCG Oral Tab, TAKE 1 TABLET BY MOUTH EVERY DAY BEFORE BREAKFAST, Disp: 30 tablet, Rfl: 2  tamsulosin (FLOMAX) cap, TAKE 2 CAPSULES (0.8MG     TOTAL) DAILY, Disp: 180 capsule, Rfl: 1  KLOR-CON M20 20 MEQ Oral Tab CR, TAKE 1 TABLE pulses. Chest and Back: No tenderness or deformity. Abdomen: Soft, nontender, nondistended. Positive bowel sounds. No rebound, guarding or organomegaly. Neurologic: No focal neurological deficits. CNII-XII grossly intact.   Musculoskeletal: Moves all e Tracy Perkins MD  4/14/2021

## 2021-04-14 NOTE — ED INITIAL ASSESSMENT (HPI)
Cellulitis to left foot noted last week, saw PMD on Monday and started on antibiotics, states no improvement, states not worse, sent by PMD for further eval.

## 2021-04-15 ENCOUNTER — APPOINTMENT (OUTPATIENT)
Dept: ULTRASOUND IMAGING | Facility: HOSPITAL | Age: 76
DRG: 617 | End: 2021-04-15
Attending: PODIATRIST
Payer: MEDICARE

## 2021-04-15 ENCOUNTER — APPOINTMENT (OUTPATIENT)
Dept: MRI IMAGING | Facility: HOSPITAL | Age: 76
DRG: 617 | End: 2021-04-15
Attending: PODIATRIST
Payer: MEDICARE

## 2021-04-15 PROCEDURE — 99221 1ST HOSP IP/OBS SF/LOW 40: CPT | Performed by: PODIATRIST

## 2021-04-15 PROCEDURE — 73718 MRI LOWER EXTREMITY W/O DYE: CPT | Performed by: PODIATRIST

## 2021-04-15 PROCEDURE — 99225 SUBSEQUENT OBSERVATION CARE: CPT | Performed by: HOSPITALIST

## 2021-04-15 NOTE — HOME CARE LIAISON
[facility]. Patient will need ADIN order entered on or before date of discharge if admitted inpatient and returning home with home health.

## 2021-04-15 NOTE — PROGRESS NOTES
Checked with US and MRI departments. US said testing will most likely be done 4/16 am. MRI plans on exam being done later tonight unsure of time.  Screening sheet completed, stents should not cause any issue with MRI exam.

## 2021-04-15 NOTE — PROGRESS NOTES
Surgical shoe ordered for patient for left foot and to help off load foot and keep wound area off of floor.

## 2021-04-15 NOTE — PROGRESS NOTES
Patient requests that klonopin dose be given divide this am. Received 1 tab around 8 am, second at 1030 to give am total dose of 2 mg.

## 2021-04-15 NOTE — PLAN OF CARE
Pt AOx4. R.A, BRITNEY w/ CPAP at night. Denies pain at this time. Left foot NISHI, redness and edema. Pt refused to elevate LLE. WBAT, Up w/ standby assist. VS remain stable, refusing telemetry, voiding freely, last BM 4/13/2021.  SCDs refused, ankle pumps encour

## 2021-04-15 NOTE — PROGRESS NOTES
Patient admitted from ER. Has had a wound on left second toe, and since has developed redness and swelling up into LLE. Patient a/ox4, irritable at times. Admission database completed. Paged Dr Amanda Skaggs for podiatry consult, awaiting callback.    rah villanueva

## 2021-04-15 NOTE — PROGRESS NOTES
[facility]  Progress Note     Maria G Fischer Patient Status:  Observation    1945 MRN [id number]   [facility] 3SW-A Attending Jamison Murdock MD   Hosp Day # 0 PCP Destiney Brown DO     Chief Complaint: left ankle cellulit 168 hours. Creatinine Kinase  No results for input(s): CK in the last 168 hours. Inflammatory Markers  Recent Labs   Lab 04/14/21  1403   CRP 0.88*       Imaging: Imaging data reviewed in Epic.     Medications:   • aspirin  81 mg Oral Daily   • Flutic

## 2021-04-15 NOTE — CONSULTS
[facility]    Report of Consultation    Relga Pitcher Patient Status:  Observation    1945 MRN [id number]   [facility] 3SW-A Attending Sreedhar Araujo MD   Hosp Day # 0 PCP TRISTAN POZO,      Date of Admission:  2021 hypertension    • Essential hypertension, benign 3/26/2012   • Gallstone 11/08/07    on ct small stone   • Heart attack [facility])    • Hemorrhoids    • High blood pressure    • High cholesterol    • History of noncompliance with medical treatment 9/07   • Hype Attack Father    • Other (bone cancer) Mother       reports that he has been smoking cigarettes. He started smoking about 60 years ago. He has a 11.25 pack-year smoking history.  He has never used smokeless tobacco. He reports that he does not drink alcohol 100 mg, 100 mg, Oral, BID  •  PEG 3350 (MIRALAX) powder packet 17 g, 17 g, Oral, Daily PRN  •  magnesium hydroxide (MILK OF MAGNESIA) 400 MG/5ML suspension 30 mL, 30 mL, Oral, Daily PRN  •  bisacodyl (DULCOLAX) rectal suppository 10 mg, 10 mg, Rectal, Gianna PM     Finalized by (CST): Tang Burroughs MD on 4/14/2021 at 2:50 PM          Laboratory Data:  Recent Labs   Lab 04/14/21  1403   RBC 3.89   HGB 11.8*   HCT 35.8*   MCV 92.0   MCH 30.3   MCHC 33.0   RDW 13.9   NEPRELIM 6.19   WBC 9.2   .0       Impress troponin     NSTEMI (non-ST elevated myocardial infarction) (HCC)     DCM (dilated cardiomyopathy) (HCC)     Ventricular tachyarrhythmia (HCC)     Hypoxia     Influenza     Generalized anxiety disorder     Cellulitis of left lower extremity     Hyponatremi

## 2021-04-15 NOTE — CM/SW NOTE
04/15/21 1400   CM/SW Referral Data   Referral Source Physician   Reason for Referral Discharge planning   Informant Patient   Patient [address]   Patient lives with Spouse   Patient Status Prior to Admission   Independent

## 2021-04-15 NOTE — PLAN OF CARE
Pt a/ox4, irritable at times. Denies pain when assessed. +1 edema to LLE, +2 to foot. Redness appears slightly improved since admission. Seen by Dr Any Okeefe today, MRI of foot completed, shows osteo to second toe, arterial US pending.    Up with assist and

## 2021-04-16 PROCEDURE — 99232 SBSQ HOSP IP/OBS MODERATE 35: CPT | Performed by: HOSPITALIST

## 2021-04-16 NOTE — PLAN OF CARE
Pt alert and oriented x4. RA//IS. BRITNEY/CPAP. Declines tele and SCDs. Voiding freely in bathroom. Standby assist with walker. Last bowel movement 4/13 pt declined laxative. Denies pain. Weak pulses noted in BLE. Edema noted in BLE.  BLE warm, dry and red in

## 2021-04-16 NOTE — PLAN OF CARE
A&Ox4, Arterial US to be done , US called- will be done later today, +2 pitting edema to LLE , +1 to RLE, Bilat feet dry & reddened, refused scd's, tele & , wife at bedside, updated on plan of care,dc planning =[facility].       [place] 43 w/ , [place]

## 2021-04-16 NOTE — PROGRESS NOTES
[facility]  Progress Note     Joshua Talley Patient Status:  Observation    1945 MRN [id number]   [facility] 3SW-A Attending Baltazar Mclaughlin MD   Hosp Day # 0 [address]     Chief Complaint: left ankle cellulit input(s): TROP, PBNP in the last 168 hours. Creatinine Kinase  No results for input(s): CK in the last 168 hours. Inflammatory Markers  Recent Labs   Lab 04/14/21  1403   CRP 0.88*       Imaging: Imaging data reviewed in Epic.     Medications:   • ins

## 2021-04-17 ENCOUNTER — APPOINTMENT (OUTPATIENT)
Dept: ULTRASOUND IMAGING | Facility: HOSPITAL | Age: 76
DRG: 617 | End: 2021-04-17
Attending: PODIATRIST
Payer: MEDICARE

## 2021-04-17 ENCOUNTER — APPOINTMENT (OUTPATIENT)
Dept: CV DIAGNOSTICS | Facility: HOSPITAL | Age: 76
DRG: 617 | End: 2021-04-17
Attending: INTERNAL MEDICINE
Payer: MEDICARE

## 2021-04-17 PROCEDURE — 93926 LOWER EXTREMITY STUDY: CPT | Performed by: PODIATRIST

## 2021-04-17 PROCEDURE — 99232 SBSQ HOSP IP/OBS MODERATE 35: CPT | Performed by: HOSPITALIST

## 2021-04-17 RX ORDER — CLONAZEPAM 0.5 MG/1
1 TABLET ORAL EVERY 6 HOURS PRN
Status: DISCONTINUED | OUTPATIENT
Start: 2021-04-17 | End: 2021-04-27

## 2021-04-17 NOTE — PLAN OF CARE
Notified Dr. Massiel Benson of US results, see new order. Attempted to page Dr. Luciana Donahue for new consult - not currently on call, RN given number for [facility] main switchboard. Paged Dr. Genna Lucero for new vascular surgery consult. Will monitor.     [id number]: Dr. Genna Lucero aware of

## 2021-04-17 NOTE — PLAN OF CARE
Pt denies pain. VSS. Ambulating well with walker. Voiding without difficulty. IV antibiotics infused per orders, tolerating well. Plan: TBD, awaiting vascular surgery evaluation. Will monitor.

## 2021-04-17 NOTE — CONSULTS
[facility] Cardiology Consult      Rimma Pitcher Patient Status:  Inpatient    1945 MRN [id number]   [facility] 3SW-A Attending Adnreas Frazier MD   Hosp Day # 1  [facility],        Rimma Pitcher is a 67 ye thyroid    • Essential hypertension    • Essential hypertension, benign 3/26/2012   • Gallstone 11/08/07    on ct small stone   • Heart attack ([facility] [address]    • Hemorrhoids    • High blood pressure    • High cholesterol    • History of noncompliance with medical before breakfast., Disp: , Rfl:   Levothyroxine Sodium 200 MCG Oral Tab, Take 200 mcg by mouth before breakfast., Disp: , Rfl:   spironolactone 50 MG Oral Tab, Take 50 mg by mouth daily. , Disp: , Rfl:   metFORMIN HCl 1000 MG Oral Tab, Take 1,000 mg by mout Succinate ER  50 mg Oral Daily   • atorvastatin  20 mg Oral Nightly   • tamsulosin HCl  0.8 mg Oral Daily   • torsemide  60 mg Oral Daily   • Heparin Sodium (Porcine)  5,000 Units Subcutaneous Q8H [address]   • docusate sodium  100 mg Oral BID   • Insulin Aspart 6/2018:occluded RCA-->AMISH. Also patent proximal RCA and proximal LAD stents.  of distal obtuse marginal branches unchanged from prior.     Labs:  Recent Labs   Lab 04/14/21  1403 04/17/21  0526   WBC 9.2 8.7   HGB 11.8* 11.6*   .0 217.0       Rec

## 2021-04-17 NOTE — PROGRESS NOTES
[facility]  Progress Note     Vince Melgar Patient Status:  Observation    1945 MRN [id number]   [facility] 3SW-A Attending Brea Griffith MD   Hosp Day # 1 PCP Cody Fernandez DO     Chief Complaint: left ankle cellulit Date    COVID19 Not Detected 04/14/2021       Pro-Calcitonin  No results for input(s): PCT in the last 168 hours. Cardiac  No results for input(s): TROP, PBNP in the last 168 hours.     Creatinine Kinase  No results for input(s): CK in the last 168 hours medicines      Quality:  · DVT Prophylaxis: scd  · CODE status: full  · Hutson:   · If COVID testing is negative, may discontinue isolation: yes   Narciso Fischer MD

## 2021-04-17 NOTE — PLAN OF CARE
Alert and oriented,V/S stable,BRITNEY,CPAP at night,refuses tele and ,as well as SCD'S. Impatient at times. Gets up to the bathroom with walker,voids without issues. Denies need for pain medication,neuropathy to both lower extremities,doppler used for pedal pu

## 2021-04-18 ENCOUNTER — APPOINTMENT (OUTPATIENT)
Dept: CT IMAGING | Facility: HOSPITAL | Age: 76
DRG: 617 | End: 2021-04-18
Attending: SURGERY
Payer: MEDICARE

## 2021-04-18 ENCOUNTER — APPOINTMENT (OUTPATIENT)
Dept: CV DIAGNOSTICS | Facility: HOSPITAL | Age: 76
DRG: 617 | End: 2021-04-18
Attending: INTERNAL MEDICINE
Payer: MEDICARE

## 2021-04-18 PROCEDURE — 99232 SBSQ HOSP IP/OBS MODERATE 35: CPT | Performed by: HOSPITALIST

## 2021-04-18 PROCEDURE — 93306 TTE W/DOPPLER COMPLETE: CPT | Performed by: INTERNAL MEDICINE

## 2021-04-18 PROCEDURE — 74176 CT ABD & PELVIS W/O CONTRAST: CPT | Performed by: SURGERY

## 2021-04-18 RX ORDER — FLUTICASONE PROPIONATE 50 MCG
2 SPRAY, SUSPENSION (ML) NASAL DAILY
Status: DISCONTINUED | OUTPATIENT
Start: 2021-04-18 | End: 2021-04-27

## 2021-04-18 NOTE — PLAN OF CARE
ED admit 4/14 Lt leg/ foot cellulitis, Pt is AAOX4, VSS, TELE, BRITNEY CPAP, doppler pulses, voiding freely to restroom, up min assist w/ walker, glucose monitored and treated per orders, IV SL, Heparin, planned stress test, echo, and CT still to be completed,

## 2021-04-18 NOTE — PROGRESS NOTES
[facility]  Progress Note     Relda Pitcher Patient Status:  Observation    1945 MRN [id number]   [facility] 3SW-A Attending Sreedhar Araujo MD   Hosp Day # 2 PCP [address] [place] [address],      Chief Complaint: left ankle cellulit COVID19 Not Detected 04/14/2021       Pro-Calcitonin  No results for input(s): PCT in the last 168 hours. Cardiac  No results for input(s): TROP, PBNP in the last 168 hours. Creatinine Kinase  No results for input(s): CK in the last 168 hours.     Inf scd  · CODE status: full  · Hutson:   · If COVID testing is negative, may discontinue isolation: yes   Ameena Abarca MD

## 2021-04-18 NOTE — PROGRESS NOTES
[facility] Cardiology Progress Note      Juliana Lewis Patient Status:  Inpatient    1945 MRN [id number]   [facility] 3SW-A Attending Víctor Moore MD   Hosp Day # 2 PCP TRISTAN POZO, DO     Subjective:    Feels ok thyromegaly  RESPIRATORY: clear to auscultation  CARDIOVASCULAR: S1, S2 normal, RRR; no S3, no S4; no click; no murmur  ABDOMEN: normal active BS, soft, nondistended; nontender  EXTREMITIES: no cyanosis, clubbing, 1+ edema LLE, peripheral pulses intact  SK Sneha Dodson MD  Cardiology / Clinical Cardiac Electrophysiology  [place] [address]

## 2021-04-18 NOTE — PLAN OF CARE
Pt's son called and informed this writer that Pt had a bottle of Klonopin. Asked Pt about it and at first said no he did not, I asked to loook through his belongings and said no.  I stated that without knowing if he had any of his own medication I could not

## 2021-04-18 NOTE — CONSULTS
Lori Fuller MD.  [facility]  Vascular and Endovascular Surgery     VASCULAR SURGERY   CONSULT NOTE      Name: Vanessa Stephen   :   1945  [id number]     2021       REFERRING PHYSICIAN: Tod Okeefe MD  PRIMARY CARE PHYSICI technically difficult study d/t anatomic reasons, mild inferior basilar hypokinesia, mild concentric left ventricular hypertrophy, decreased left ventricular compliance   • Elevated lipids    • Elevated PSA    • Enlarged thyroid    • Essential hypertension MEDICATIONS:     Current Facility-Administered Medications:   •  insulin detemir (LEVEMIR) 100 UNIT/ML flextouch 32 Units, 32 Units, Subcutaneous, Q12H  •  Insulin Aspart Pen (NOVOLOG) 100 UNIT/ML flexpen 1-68 Units, 1-68 Units, Subcutaneous, TID AC 17 g, Oral, Daily PRN  •  magnesium hydroxide (MILK OF MAGNESIA) 400 MG/5ML suspension 30 mL, 30 mL, Oral, Daily PRN  •  bisacodyl (DULCOLAX) rectal suppository 10 mg, 10 mg, Rectal, Daily PRN  •  Fleet Enema (FLEET) 7-19 GM/118ML enema 133 mL, 1 enema, Re pitting edema up to the knees. Erythema of the left ankle and foot. VASCULAR: Biphasic DP, PT and peroneal signals on the left. Vein signals are pulsatile.        Diagnostic Data:      LABS:  Recent Labs   Lab 04/14/21  1403 04/17/21  0526   WBC 9.2 8.7 LEFT (CPT=73718)    Result Date: 4/15/2021  PROCEDURE:  MRI FOOT, LEFT (CPT=73718)  COMPARISON:  [facility] , XR, XR FOOT, COMPLETE (MIN 3 VIEWS), LEFT (CPT=73630), 4/14/2021, 2:20 PM.  INDICATIONS:  Infection of the 2nd toe with abnormal x-ray.   TECHNIQUE:  A measurements of the common femoral, profunda femoral, superficial femoral, and popliteal arteries were performed.    PATIENT STATED HISTORY: (As transcribed by Technologist)     FINDINGS:  LEFT EXTREMITY:  Triphasic waveforms the left common femoral, profun

## 2021-04-18 NOTE — PLAN OF CARE
Alert and oriented,VS stable,AFEBRILE. Ambulates to the bathroom using walker,gait slow but steady. Remains on IV antibiotic. Patient keeps asking for snacks,had to be reminded  how it affects his blood sugar. Denies need for pain medication. Will continue to m

## 2021-04-19 ENCOUNTER — APPOINTMENT (OUTPATIENT)
Dept: CV DIAGNOSTICS | Facility: HOSPITAL | Age: 76
DRG: 617 | End: 2021-04-19
Attending: INTERNAL MEDICINE
Payer: MEDICARE

## 2021-04-19 PROCEDURE — 78452 HT MUSCLE IMAGE SPECT MULT: CPT | Performed by: INTERNAL MEDICINE

## 2021-04-19 PROCEDURE — 93017 CV STRESS TEST TRACING ONLY: CPT | Performed by: INTERNAL MEDICINE

## 2021-04-19 PROCEDURE — 93018 CV STRESS TEST I&R ONLY: CPT | Performed by: INTERNAL MEDICINE

## 2021-04-19 PROCEDURE — 99232 SBSQ HOSP IP/OBS MODERATE 35: CPT | Performed by: HOSPITALIST

## 2021-04-19 NOTE — PROGRESS NOTES
CARDIACDIAGNOSTICS NOTE      Inpatient nuclear Lexiscan stress test ordered by [facility] Cardiologist Dr. Arlet Edwards. Stationary Lexiscan protocol implemented, with 0.4mg Lexiscan infused while pt seated in chair. The pt denied any cardiac symptoms.  Nuc

## 2021-04-19 NOTE — PLAN OF CARE
DR Moose Echols COVERING DR Lena Lion  RE UPDATE. NO SURGERY MARTINE. NEED US ORDER BY DR Lena Lion. US DEPT NOTIFIED WILL BE DONE MARTINE.  WILL UPDATE FAMILY

## 2021-04-19 NOTE — PROGRESS NOTES
[facility] Card[facility]  Progress Note    Zeeshan Negron Patient Status:  Inpatient    1945 MRN [id number]   [facility] 3SW-A Attending Navid Lemon MD   Hosp Day # 3  [facility]     Cardiology attending:  Candice Gil 04/18/21  1145 04/18/21  1554 04/18/21 2049 04/19/21  0742 04/19/21  1255   PGLU 309* 214* 268* 221* 234*       Tele: SR    Imaging:    XR FOOT, COMPLETE (MIN 3 VIEWS), LEFT (CPT=73630)  Result Date: 4/14/2021    CONCLUSION:  There is some soft tissue swe the distal left anterior tibial artery is likely due to marked stenosis.     Dictated by (CST): Beck Magana MD on 4/17/2021 at 12:47 PM     Finalized by (CST): Beck Magana MD on 4/17/2021 at 12:49 PM       CARD ECHO 2D DOPPLER CONTRAST (CPT=93 Transvalvular velocity was within the normal range. There was no regurgitation. Pericardium:  There was no pericardial effusion. Aorta: Aortic root: The aortic root was not dilated. Pulmonary artery:    Systolic pressure could not be accurately estimated. continue current cardiac medications  - CHF plan of care: daily weights, accurate I&O    Concerns regarding plan of care were discussed with patient. Patient agrees with plan as detailed above.      Gaby eDan NP  4/19/2021  2:48 PM    Savi

## 2021-04-19 NOTE — PROGRESS NOTES
[facility]  Progress Note     Alize How Patient Status:  Observation    1945 MRN [id number]   [facility] 3SW-A Attending Aleena Do MD   Hosp Day # 3 PCP Aguilar Pulliam DO     Chief Complaint: left ankle cellulit input(s): PTP, INR in the last 168 hours. COVID-19 Lab Results    COVID-19  Lab Results   Component Value Date    COVID19 Not Detected 04/14/2021       Pro-Calcitonin  No results for input(s): PCT in the last 168 hours.     Cardiac  No results for i weight loss advised     4. Chronic kidney disease 3 with creatinine of 2.2 at baseline     5.   History of CAD hypertension hyperlipidemia  -Continue all home medicines      Quality:  · DVT Prophylaxis: scd  · CODE status: full  · Hutson:   · If COVID testi

## 2021-04-19 NOTE — PLAN OF CARE
V/S stable,denies chest pain or discomfort. Tele,rhythm stable,SPOT CHECKED for O2 sat. UP with walker to the bathroom. Voiding freely. Refused SCD'S. For stress test in am,instructed not to take any caffeine drinks after midnight,verbalizes understanding. Diana

## 2021-04-19 NOTE — PLAN OF CARE
RECD ALERT ,AWAKE, ORIENTED. DENIES CALF PAIN OR SOB OR CP. PLAN STRESS TEST AWARE NO CAFFEINE. PT STATES\" MY LAST COFFEE LAST NIGHT AT 9 PM\" CARDIO AWARE. CALL LIGHT W/IN REACH TO CALL FOR ALL NEEDS AND ASSISTANCE

## 2021-04-20 ENCOUNTER — APPOINTMENT (OUTPATIENT)
Dept: ULTRASOUND IMAGING | Facility: HOSPITAL | Age: 76
DRG: 617 | End: 2021-04-20
Attending: SURGERY
Payer: MEDICARE

## 2021-04-20 PROCEDURE — 93922 UPR/L XTREMITY ART 2 LEVELS: CPT | Performed by: SURGERY

## 2021-04-20 PROCEDURE — 99232 SBSQ HOSP IP/OBS MODERATE 35: CPT | Performed by: HOSPITALIST

## 2021-04-20 RX ORDER — CLONAZEPAM 0.5 MG/1
1 TABLET ORAL 4 TIMES DAILY
Status: DISCONTINUED | OUTPATIENT
Start: 2021-04-20 | End: 2021-04-27

## 2021-04-20 NOTE — PLAN OF CARE
Pt A&O. On room air. Pt wearing cpap when sleeping at night. Tele and  monitoring maintained. Pt refusing Scds. Tolerating diet with good appetite. Blood sugars monitored and insulin given as ordered. MD adjusted long-acting insulin dose today.  Had larg

## 2021-04-20 NOTE — PROGRESS NOTES
Non-invasive vascular studies reviewed. Will plan for LLE angiogram prior to podiatry intervention to optimize wound healing. Please keep patient NPO after midnight for procedure tomorrow.

## 2021-04-20 NOTE — PROGRESS NOTES
[facility]  Progress Note     Zeeshan Negron Patient Status:  Observation    1945 MRN [id number]   [facility] 3SW-A Attending Jorge Arteaga MD   Hosp Day # 4 PCP Tricia Connors DO     Chief Complaint: left ankle cellulit input(s): PTP, INR in the last 168 hours. COVID-19 Lab Results    COVID-19  Lab Results   Component Value Date    COVID19 Not Detected 04/14/2021       Pro-Calcitonin  No results for input(s): PCT in the last 168 hours.     Cardiac  No results for i obesity BMI 40 weight loss advised     4. Chronic kidney disease 3 with creatinine of 2.2 at baseline     5.   History of CAD hypertension hyperlipidemia  -Continue all home medicines      Quality:  · DVT Prophylaxis: scd  · CODE status: full  · Hutson:   ·

## 2021-04-20 NOTE — CM/SW NOTE
Care Progression Note:  Active Acute Medical Issue:   Cellulitis of left lower extremity , redness and swelling. Failed outpatient management. MRi confirmed OM. US shows severe stenosis, no flow - vascular following. Cardiac clearance, ST 4/19.     Othe agreeable can setup conference call w/family. Son verbalizes understanding, also states that his mom (patient's spouse) has TBI.     Marco A Barreto RN,   Phone [phone]

## 2021-04-20 NOTE — PROGRESS NOTES
[facility] Cardiology Progress Note        Iglesia Camilo Patient Status:  Inpatient    1945 MRN [id number]   [facility] 3SW-A Attending Minerva Ware MD   [facility] Day # 4 PCP DO Stacey BURROWS Warm and dry.      Telemetry:     EKG:      Echo:      Cardiac Cath:      Labs:  HEM:  Recent Labs   Lab 04/14/21  1403 04/17/21  0526 04/19/21  0459   WBC 9.2 8.7 9.0   HGB 11.8* 11.6* 11.3*   .0 217.0 234.0       Chem:  Recent Labs   Lab 04/14/21

## 2021-04-20 NOTE — TELEPHONE ENCOUNTER
Pt son calling stating that the pt has been admitted to the hospital and discussing having to do surgery of possibly amputating a toe. The hospital states that they do not know who  is and pt son is hoping that  would be able to explain what is going on. Please advise.

## 2021-04-20 NOTE — PLAN OF CARE
Patient A&O X4 on RA- hx BRITNEY with CPAP. VSS, /telemetry- NSR. Voiding freely, LBM 4/17- refusing scheduled stool softener. Refusing SCDs- on heparin. On IV Unasyn. WBAT to LLE- min assist with walker. Reminded to use call light.  Plan to have US ankle to

## 2021-04-21 ENCOUNTER — APPOINTMENT (OUTPATIENT)
Dept: INTERVENTIONAL RADIOLOGY/VASCULAR | Facility: HOSPITAL | Age: 76
DRG: 617 | End: 2021-04-21
Attending: SURGERY
Payer: MEDICARE

## 2021-04-21 DIAGNOSIS — E03.9 HYPOTHYROIDISM, UNSPECIFIED: ICD-10-CM

## 2021-04-21 PROCEDURE — 99232 SBSQ HOSP IP/OBS MODERATE 35: CPT | Performed by: HOSPITALIST

## 2021-04-21 PROCEDURE — B410YZZ FLUOROSCOPY OF ABDOMINAL AORTA USING OTHER CONTRAST: ICD-10-PCS | Performed by: SURGERY

## 2021-04-21 PROCEDURE — B41GYZZ FLUOROSCOPY OF LEFT LOWER EXTREMITY ARTERIES USING OTHER CONTRAST: ICD-10-PCS | Performed by: SURGERY

## 2021-04-21 RX ORDER — DEXTROSE AND SODIUM CHLORIDE 5; .45 G/100ML; G/100ML
INJECTION, SOLUTION INTRAVENOUS CONTINUOUS
Status: DISCONTINUED | OUTPATIENT
Start: 2021-04-21 | End: 2021-04-22

## 2021-04-21 RX ORDER — DIPHENHYDRAMINE HYDROCHLORIDE 50 MG/ML
INJECTION INTRAMUSCULAR; INTRAVENOUS
Status: COMPLETED
Start: 2021-04-21 | End: 2021-04-21

## 2021-04-21 RX ORDER — HEPARIN SODIUM 5000 [USP'U]/ML
INJECTION, SOLUTION INTRAVENOUS; SUBCUTANEOUS
Status: COMPLETED
Start: 2021-04-21 | End: 2021-04-21

## 2021-04-21 RX ORDER — VERAPAMIL HYDROCHLORIDE 2.5 MG/ML
INJECTION, SOLUTION INTRAVENOUS
Status: COMPLETED
Start: 2021-04-21 | End: 2021-04-21

## 2021-04-21 RX ORDER — MIDAZOLAM HYDROCHLORIDE 1 MG/ML
INJECTION INTRAMUSCULAR; INTRAVENOUS
Status: DISCONTINUED
Start: 2021-04-21 | End: 2021-04-21 | Stop reason: WASHOUT

## 2021-04-21 RX ORDER — NITROGLYCERIN 20 MG/100ML
INJECTION INTRAVENOUS
Status: COMPLETED
Start: 2021-04-21 | End: 2021-04-21

## 2021-04-21 RX ORDER — SODIUM CHLORIDE 9 MG/ML
INJECTION, SOLUTION INTRAVENOUS CONTINUOUS
Status: DISCONTINUED | OUTPATIENT
Start: 2021-04-21 | End: 2021-04-22

## 2021-04-21 RX ORDER — LIDOCAINE HYDROCHLORIDE 10 MG/ML
INJECTION, SOLUTION EPIDURAL; INFILTRATION; INTRACAUDAL; PERINEURAL
Status: COMPLETED
Start: 2021-04-21 | End: 2021-04-21

## 2021-04-21 RX ORDER — MIDAZOLAM HYDROCHLORIDE 1 MG/ML
INJECTION INTRAMUSCULAR; INTRAVENOUS
Status: COMPLETED
Start: 2021-04-21 | End: 2021-04-21

## 2021-04-21 NOTE — PLAN OF CARE
Patient A&O X4 on RA- hx BRITNEY with CPAP. VSS, /telemetry- NSR. Voiding freely, LBM 4/20. Refusing SCDs- on subQ heparin. IV Unasyn Q12h. Redness/flaky to left foot 2nd toe/LLE. Denies pain. WBAT, min assist with walker.  Patient to be NPO at midnight for

## 2021-04-21 NOTE — PROGRESS NOTES
[facility]  Progress Note     Gabriella Sprague Patient Status:  Observation    1945 MRN [id number]   [facility] 3SW-A Attending Renetta Yang MD   Hosp Day # 5 PCP [address] [place] [address]     Chief Complaint: left ankle cellulit --    ALT 25  --   --   --   --    BILT 0.3  --   --   --   --    TP 7.1  --   --   --   --     < > = values in this interval not displayed. Estimated Creatinine Clearance: 31.8 mL/min (A) (based on SCr of 2.36 mg/dL (H)).     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 Discussed cost and pt's concerns and she stated she would figure it out.  Will switch back to levemir

## 2021-04-21 NOTE — PLAN OF CARE
Pt A&O. On room air. Pt wearing cpap when sleeping at night. Tele and  monitoring maintained. Pt refusing Scds. Remains NPO for angiogram today. Blood sugars monitored and insulin given as ordered. MD adjusted insulin today again d/t NPO status.  Had BM

## 2021-04-21 NOTE — PROGRESS NOTES
[facility] Cardiology Progress Note - Eula Griffith Patient Status:  Inpatient    1945 MRN [id number]   [facility] 3SW-A Attending Micheal Bell MD   Hosp Day # 5 PCP TRISTAN POZO, DO     Subjective:   We hernia without obstruction or gangrene     Urinary retention     JAMES (acute kidney injury) ([facility] [address]     CKD (chronic kidney disease), stage IV (HCC)     Right heart failure (HCC)     Panlobular emphysema (HCC)     Type 2 diabetes mellitus with stage 4 chronic 8.7 9.0   HGB 11.6* 11.3*   MCV 89.8 90.3   .0 234.0       Recent Labs   Lab 04/17/21  0526 04/19/21  0459 04/21/21  0520   * 135* 138   K 4.1 4.1 4.1    103 104   CO2 26.0 25.0 26.0   BUN 34* 35* 37*   CREATSERUM 2.24* 2.17* 2.36*   CA Intravenous, Q15 Min PRN   Or  glucose (DEX4) oral liquid 30 g, 30 g, Oral, Q15 Min PRN   Or  Glucose-Vitamin C (DEX-4) chewable tab 8 tablet, 8 tablet, Oral, Q15 Min PRN  Heparin Sodium (Porcine) 5000 UNIT/ML injection 5,000 Units, 5,000 Units, Subcutaneo MD  4/21/2021  2:55 PM

## 2021-04-21 NOTE — TELEPHONE ENCOUNTER
Spoke with patient's wife and son yesterday. Had lengthy conversation with patient's son, Kevin Honeycutt. Answered all his questions regarding his father's care in the hospital. Please provide Dr. Gris Lang office information and phone number to Kevin Honeycutt for both him and his father Jonelle Fermin for after discharge to manage his diabetes.

## 2021-04-21 NOTE — PLAN OF CARE
Assumed care at 1700. Pt from the cath lab with radial wrist band. Wrist band deflated per protocol without any issues. Pt eating dinner. Denies any pain. VSS.

## 2021-04-21 NOTE — DIETARY NOTE
[address]     Admitting diagnosis:  Hyponatremia [E87.1]  Hyperglycemia [R73.9]  Cellulitis of left lower extremity [R43.114]    Ht: 190.5 cm (6' 3\")  Wt: (!) 143.4 kg (316 lb 3.2 oz).    Body mass index is 39

## 2021-04-21 NOTE — PROGRESS NOTES
Pt was in bathroom and was pulling down his underpants and lost his balance and fell forward. Pt's spouse was in the room and heard him moan. RN and PCT assisted pt back to the bed using the lift equipment. No injuries noted.  Pt able to move all extremitie

## 2021-04-22 ENCOUNTER — TELEPHONE (OUTPATIENT)
Dept: PODIATRY CLINIC | Facility: CLINIC | Age: 76
End: 2021-04-22

## 2021-04-22 ENCOUNTER — ANESTHESIA EVENT (OUTPATIENT)
Dept: SURGERY | Facility: HOSPITAL | Age: 76
DRG: 617 | End: 2021-04-22
Payer: MEDICARE

## 2021-04-22 PROCEDURE — 99223 1ST HOSP IP/OBS HIGH 75: CPT | Performed by: INTERNAL MEDICINE

## 2021-04-22 PROCEDURE — 99232 SBSQ HOSP IP/OBS MODERATE 35: CPT | Performed by: HOSPITALIST

## 2021-04-22 RX ORDER — LEVOTHYROXINE SODIUM 0.2 MG/1
TABLET ORAL
Qty: 90 TABLET | Refills: 1 | Status: SHIPPED | OUTPATIENT
Start: 2021-04-22 | End: 2021-04-27

## 2021-04-22 NOTE — PROGRESS NOTES
Patient is seen resting comfortably in bed. He has been awaiting my visit because he really wants to get out of the hospital.  I spoken directly with Dr. Karey Bae about his circulatory status he feels it is adequate for surgery.   The patient was evaluated

## 2021-04-22 NOTE — PLAN OF CARE
Assumed care at 1930  CPAP overnight  Bed alarm/chair alarm on  Abx given  Needs met will continue to monitor          Problem: SAFETY ADULT - FALL  Goal: Free from fall injury  Description: INTERVENTIONS:  - Assess pt frequently for physical needs  - Iden

## 2021-04-22 NOTE — CONSULTS
[facility]  Report of Consultation    Vanga Ellis Patient Status:  Inpatient    1945 MRN [id number]   [facility] 7NE-A Attending Andreas Frazier MD   Hosp Day # 6 PCP [address] [place] [address],      Reason for Consultation:  CKD I 11/5/07    w/burning   • Echocardiogram abnormal 9/7/07    technically difficult study d/t anatomic reasons, mild inferior basilar hypokinesia, mild concentric left ventricular hypertrophy, decreased left ventricular compliance   • Elevated lipids    • Monet rt cataract   • THYROIDECTOMY       Family History   Problem Relation Age of Onset   • Heart Attack Father    • Other (bone cancer) Mother       reports that he has been smoking cigarettes. He started smoking about 60 years ago.  He has a 11.25 pack-yea 50 % injection 50 mL, 50 mL, Intravenous, Q15 Min PRN **OR** glucose (DEX4) oral liquid 30 g, 30 g, Oral, Q15 Min PRN **OR** Glucose-Vitamin C (DEX-4) chewable tab 8 tablet, 8 tablet, Oral, Q15 Min PRN  •  Heparin Sodium (Porcine) 5000 UNIT/ML injection 5, Max:98.7 °F (37.1 °C)       Intake/Output Summary (Last 24 hours) at 4/22/2021 0750  Last data filed at 4/21/2021 1900  Gross per 24 hour   Intake 774 ml   Output 500 ml   Net 274 ml     Last 3 Weights  04/20/21 1835 : (!) 316 lb 3.2 oz (143.4 kg)  04/14/2 4.1 4.1    103 104   CO2 26.0 25.0 26.0   BUN 34* 35* 37*   CREATSERUM 2.24* 2.17* 2.36*   CA 8.4* 8.4* 8.8   * 166* 81       No results for input(s): ALT, AST, ALB, AMYLASE, LIPASE, LDH in the last 168 hours.     Invalid input(s): ALPHOS, TBIL

## 2021-04-22 NOTE — CM/SW NOTE
Pt seen by neuropsych, Dr Cass Atkins, who says pt is not able to make his own decisions about his medical care. Pt's son Rachna Code will be pt's healthcare surrogate decision maker - healthcare surrogate form on chart.   Toe amputation discussed with son who

## 2021-04-22 NOTE — OPERATIVE REPORT
Vascular Surgery Op Note  Patients Name:    Wm Hardy    Operating Physician: Jin Hollingsworth MD  CSN:    [id number]                                                           Location:  OR  MRN:     [id number] his pre-existing aortoiliac stent graft, he is not deemed appropriate for a conventional open over approach and thus was recommended for a left radial access with the possibility for intervention. I discussed the risk and benefits of the procedure.   Infor

## 2021-04-22 NOTE — PROGRESS NOTES
[facility]  Progress Note     Revonda Due Patient Status:  Observation    1945 MRN [id number]   [facility] 3SW-A Attending Gautam Artis MD   Hosp Day # 6 PCP Jah ,      Chief Complaint: left ankle cellulit on SCr of 2.36 mg/dL (H)). No results for input(s): PTP, INR in the last 168 hours.          COVID-19 Lab Results    COVID-19  Lab Results   Component Value Date    COVID19 Not Detected 04/21/2021    COVID19 Not Detected 04/14/2021       Pro-Calcitonin does not want 70/30 and prefers to have the levemir. Discussed cost and pt's concerns and she stated she would figure it out. Will switch back to levemir  -decrease dose while NPO for procedures.  When eating normal diet was better controlled on 52 U levemi

## 2021-04-22 NOTE — CONSULTS
[facility]  Report of Neuropsycholgy Consultation    Wm Hardy Patient Status:  Inpatient    1945 MRN [id number]   [facility] 7NE-A Attending Isaac Cantu MD   Hosp Day # 6 PCP TRISTAN POZO DO Elevated lipids    • Elevated PSA    • Enlarged thyroid    • Essential hypertension    • Essential hypertension, benign 3/26/2012   • Gallstone 11/08/07    on ct small stone   • Heart attack [facility])    • Hemorrhoids    • High blood pressure    • High cholest ago. He has a 11.25 pack-year smoking history. He has never used smokeless tobacco. He reports that he does not drink alcohol and does not use drugs. Allergies:     Ace Inhibitors          Coughing    Medications:    Current Facility-Administered Medicat Nightly PRN  •  morphINE sulfate (PF) 2 MG/ML injection 1 mg, 1 mg, Intravenous, Q2H PRN **OR** morphINE sulfate (PF) 2 MG/ML injection 2 mg, 2 mg, Intravenous, Q2H PRN **OR** morphINE sulfate (PF) 4 MG/ML injection 4 mg, 4 mg, Intravenous, Q2H PRN  •  doc and the right parietal   region. This appears stable. Mild chronic ischemic white matter changes are seen. No skull fracture seen.  No evidence for acute sinusitis, or blood in the sinuses.         Impression   CONCLUSION:  No acute intracranial hemorrhage discrepancy between his delayed recall and some of his cognitive abilities that this is likely part of an acute delirium process.  Although his anesthesia was local he is also on Klonopin and some of this confusion could be associated with his current medic correct on the written format. This indicates mild impairment.  Abstraction and verbal reasoning is the skill or cognitive domain that is oftentimes preserved and helps with understanding premorbid levels of functioning but he demonstrates quite concrete pr temporary guardian, power of , or surrogate be involved in making complex medical decisions on his behalf. After reviewing this with the family it sounds as if surrogate needs to be established.   The patient has escalated in his agitation and rashmi

## 2021-04-22 NOTE — PROGRESS NOTES
Vascular Surgery Progress Note    /60 (BP Location: Right arm)   Pulse 77   Temp 98.9 °F (37.2 °C) (Oral)   Resp 16   Ht 6' 3\" (1.905 m)   Wt (!) 316 lb 3.2 oz (143.4 kg)   SpO2 98%   BMI 39.52 kg/m²     Recent Labs   Lab 04/17/21  0526 04/19/21  04

## 2021-04-22 NOTE — PROGRESS NOTES
[facility] Cardiology Progress Note - [facility] Patient Status:  Inpatient    1945 MRN [id number]   [facility] 7NE-A Attending Navid Lemon MD   Hosp Day # 6 PCP TRISTAN POZO, DO     Subjective:  Pa Venous insufficiency     Peripheral edema     Acute renal insufficiency     Ventral hernia without obstruction or gangrene     Urinary retention     JAMES (acute kidney injury) ([facility] [address]     CKD (chronic kidney disease) stage 4, GFR 15-29 ml/min ([facility])     Right Warm and dry.      Laboratory/Data:    Labs:         Recent Labs   Lab 04/17/21  0526 04/19/21  0459   WBC 8.7 9.0   HGB 11.6* 11.3*   MCV 89.8 90.3   .0 234.0       Recent Labs   Lab 04/17/21  0526 04/19/21  0459 04/21/21  0520   * 135* 138 injection 50 mL, 50 mL, Intravenous, Q15 Min PRN   Or  glucose (DEX4) oral liquid 30 g, 30 g, Oral, Q15 Min PRN   Or  Glucose-Vitamin C (DEX-4) chewable tab 8 tablet, 8 tablet, Oral, Q15 Min PRN  Heparin Sodium (Porcine) 5000 UNIT/ML injection 5,000 Units, allergies    London Paniagua MD  4/22/2021  10:52 AM

## 2021-04-22 NOTE — ANESTHESIA PREPROCEDURE EVALUATION
PRE-OP EVALUATION    Patient Name: Eufemia Marcial    Admit Diagnosis: Hyponatremia [E87.1]  Hyperglycemia [R73.9]  Cellulitis of left lower extremity [Y04.298]    Pre-op Diagnosis: INPT    PARTIAL AMPUTATIONOF 2ND TOE LEFT FOOT    Anesthesia Procedure: P nasal spray 2 spray, 2 spray, Each Nare, Daily  [COMPLETED] Perflutren Lipid Microsphere (DEFINITY) 6.52 MG/ML injection 1.5 mL, 1.5 mL, Intravenous, ONCE PRN  [COMPLETED] insulin detemir (LEVEMIR) 100 UNIT/ML flextouch 7 Units, 7 Units, Subcutaneous, Once Or  morphINE sulfate (PF) 2 MG/ML injection 2 mg, 2 mg, Intravenous, Q2H PRN   Or  morphINE sulfate (PF) 4 MG/ML injection 4 mg, 4 mg, Intravenous, Q2H PRN  docusate sodium (COLACE) cap 100 mg, 100 mg, Oral, BID  PEG 3350 (MIRALAX) powder packet 17 g, 17 g 0900  gabapentin 300 MG Oral Cap, Take 300 mg by mouth 3 (three) times daily. , Disp: , Rfl: , 4/13/2021 at 2100  acetaminophen 325 MG Oral Tab, Take 650 mg by mouth every 6 (six) hours as needed for Pain., Disp: , Rfl: , 4/13/2021 at Unknown time  DORINDA-DAYLIN This test was not performed as routine testing after percutaneous coronary intervention.   This test was not performed in an asymptomatic patient.                 geovanny Akins MD 2021 17:21  t   [id number] Date: 2021 :  1945 Ht: Tracheal deviation      (+) COPD and moderate  COPD not requiring home oxygen.         (+) sleep apnea and CPAP      Neuro/Psych      (+) depression  (+) anxiety                      Hx of post op delerium lasting weeks, possibly due to excess narcotic per ASA: 4   Plan: general and MAC  NPO status verified and patient meets guidelines. Post-procedure pain management plan discussed with surgeon and patient.     Comment: Consent on paper with witness, Therman Collet, a  (may answer phone by

## 2021-04-22 NOTE — PLAN OF CARE
Pt alert and oriented x2, confused and agitated at times, pt disconnecting his IV and stating he is going to leave. Per Dr. Javy Bach, surrogate established for medical decisions d/t acute delirium.  Plan for partial toe amputation with Dr. Prashant Aviles tomorr

## 2021-04-23 ENCOUNTER — APPOINTMENT (OUTPATIENT)
Dept: GENERAL RADIOLOGY | Facility: HOSPITAL | Age: 76
DRG: 617 | End: 2021-04-23
Attending: PODIATRIST
Payer: MEDICARE

## 2021-04-23 ENCOUNTER — ANESTHESIA (OUTPATIENT)
Dept: SURGERY | Facility: HOSPITAL | Age: 76
DRG: 617 | End: 2021-04-23
Payer: MEDICARE

## 2021-04-23 PROCEDURE — 99232 SBSQ HOSP IP/OBS MODERATE 35: CPT | Performed by: INTERNAL MEDICINE

## 2021-04-23 PROCEDURE — 0Y6S0Z0 DETACHMENT AT LEFT 2ND TOE, COMPLETE, OPEN APPROACH: ICD-10-PCS | Performed by: PODIATRIST

## 2021-04-23 PROCEDURE — 28820 AMPUTATION OF TOE: CPT | Performed by: PODIATRIST

## 2021-04-23 PROCEDURE — 76000 FLUOROSCOPY <1 HR PHYS/QHP: CPT | Performed by: PODIATRIST

## 2021-04-23 PROCEDURE — 99233 SBSQ HOSP IP/OBS HIGH 50: CPT | Performed by: HOSPITALIST

## 2021-04-23 RX ORDER — BUPIVACAINE HYDROCHLORIDE 5 MG/ML
INJECTION, SOLUTION EPIDURAL; INTRACAUDAL AS NEEDED
Status: DISCONTINUED | OUTPATIENT
Start: 2021-04-23 | End: 2021-04-23 | Stop reason: HOSPADM

## 2021-04-23 RX ORDER — DEXTROSE MONOHYDRATE 25 G/50ML
50 INJECTION, SOLUTION INTRAVENOUS
Status: DISCONTINUED | OUTPATIENT
Start: 2021-04-23 | End: 2021-04-23 | Stop reason: HOSPADM

## 2021-04-23 RX ORDER — HYDROCODONE BITARTRATE AND ACETAMINOPHEN 5; 325 MG/1; MG/1
1 TABLET ORAL AS NEEDED
Status: DISCONTINUED | OUTPATIENT
Start: 2021-04-23 | End: 2021-04-23 | Stop reason: HOSPADM

## 2021-04-23 RX ORDER — INSULIN ASPART 100 [IU]/ML
INJECTION, SOLUTION INTRAVENOUS; SUBCUTANEOUS
Status: COMPLETED
Start: 2021-04-23 | End: 2021-04-23

## 2021-04-23 RX ORDER — HYDROMORPHONE HYDROCHLORIDE 1 MG/ML
0.4 INJECTION, SOLUTION INTRAMUSCULAR; INTRAVENOUS; SUBCUTANEOUS EVERY 5 MIN PRN
Status: DISCONTINUED | OUTPATIENT
Start: 2021-04-23 | End: 2021-04-23 | Stop reason: HOSPADM

## 2021-04-23 RX ORDER — METOCLOPRAMIDE HYDROCHLORIDE 5 MG/ML
10 INJECTION INTRAMUSCULAR; INTRAVENOUS AS NEEDED
Status: DISCONTINUED | OUTPATIENT
Start: 2021-04-23 | End: 2021-04-23 | Stop reason: HOSPADM

## 2021-04-23 RX ORDER — INSULIN ASPART 100 [IU]/ML
INJECTION, SOLUTION INTRAVENOUS; SUBCUTANEOUS ONCE
Status: COMPLETED | OUTPATIENT
Start: 2021-04-23 | End: 2021-04-23

## 2021-04-23 RX ORDER — SODIUM CHLORIDE, SODIUM LACTATE, POTASSIUM CHLORIDE, CALCIUM CHLORIDE 600; 310; 30; 20 MG/100ML; MG/100ML; MG/100ML; MG/100ML
INJECTION, SOLUTION INTRAVENOUS CONTINUOUS PRN
Status: DISCONTINUED | OUTPATIENT
Start: 2021-04-23 | End: 2021-04-23 | Stop reason: SURG

## 2021-04-23 RX ORDER — HYDROCODONE BITARTRATE AND ACETAMINOPHEN 5; 325 MG/1; MG/1
2 TABLET ORAL AS NEEDED
Status: DISCONTINUED | OUTPATIENT
Start: 2021-04-23 | End: 2021-04-23 | Stop reason: HOSPADM

## 2021-04-23 RX ORDER — CEFAZOLIN SODIUM 1 G/3ML
INJECTION, POWDER, FOR SOLUTION INTRAMUSCULAR; INTRAVENOUS AS NEEDED
Status: DISCONTINUED | OUTPATIENT
Start: 2021-04-23 | End: 2021-04-23 | Stop reason: SURG

## 2021-04-23 RX ORDER — ACETAMINOPHEN 500 MG
1000 TABLET ORAL ONCE AS NEEDED
Status: DISCONTINUED | OUTPATIENT
Start: 2021-04-23 | End: 2021-04-23 | Stop reason: HOSPADM

## 2021-04-23 RX ORDER — SODIUM CHLORIDE, SODIUM LACTATE, POTASSIUM CHLORIDE, CALCIUM CHLORIDE 600; 310; 30; 20 MG/100ML; MG/100ML; MG/100ML; MG/100ML
INJECTION, SOLUTION INTRAVENOUS CONTINUOUS
Status: DISCONTINUED | OUTPATIENT
Start: 2021-04-23 | End: 2021-04-23 | Stop reason: HOSPADM

## 2021-04-23 RX ORDER — LABETALOL HYDROCHLORIDE 5 MG/ML
5 INJECTION, SOLUTION INTRAVENOUS EVERY 5 MIN PRN
Status: DISCONTINUED | OUTPATIENT
Start: 2021-04-23 | End: 2021-04-23 | Stop reason: HOSPADM

## 2021-04-23 RX ORDER — ONDANSETRON 2 MG/ML
4 INJECTION INTRAMUSCULAR; INTRAVENOUS AS NEEDED
Status: DISCONTINUED | OUTPATIENT
Start: 2021-04-23 | End: 2021-04-23 | Stop reason: HOSPADM

## 2021-04-23 RX ORDER — LIDOCAINE HYDROCHLORIDE 10 MG/ML
INJECTION, SOLUTION EPIDURAL; INFILTRATION; INTRACAUDAL; PERINEURAL AS NEEDED
Status: DISCONTINUED | OUTPATIENT
Start: 2021-04-23 | End: 2021-04-23 | Stop reason: SURG

## 2021-04-23 RX ORDER — NALOXONE HYDROCHLORIDE 0.4 MG/ML
80 INJECTION, SOLUTION INTRAMUSCULAR; INTRAVENOUS; SUBCUTANEOUS AS NEEDED
Status: DISCONTINUED | OUTPATIENT
Start: 2021-04-23 | End: 2021-04-23 | Stop reason: HOSPADM

## 2021-04-23 RX ADMIN — CEFAZOLIN SODIUM 3 G: 1 INJECTION, POWDER, FOR SOLUTION INTRAMUSCULAR; INTRAVENOUS at 09:53:00

## 2021-04-23 RX ADMIN — LIDOCAINE HYDROCHLORIDE 50 MG: 10 INJECTION, SOLUTION EPIDURAL; INFILTRATION; INTRACAUDAL; PERINEURAL at 09:49:00

## 2021-04-23 RX ADMIN — SODIUM CHLORIDE, SODIUM LACTATE, POTASSIUM CHLORIDE, CALCIUM CHLORIDE: 600; 310; 30; 20 INJECTION, SOLUTION INTRAVENOUS at 09:39:00

## 2021-04-23 NOTE — BRIEF OP NOTE
Pre-Operative Diagnosis: osteomyelitis of left second toe at the level of the DIP joint     Post-Operative Diagnosis: osteomyelitis of left second toe at the level of the DIP joint     Procedure Performed:   PARTIAL AMPUTATIONOF 2ND TOE LEFT FOOT disarticu

## 2021-04-23 NOTE — PROGRESS NOTES
[facility]  Progress Note     Fabby Patch Patient Status:  Observation    1945 MRN [id number]   [facility] 3SW-A Attending Faith Polanco MD   Hosp Day # 7 PCP Loren Amador DO     Chief Complaint: left ankle cellulit 04/14/2021       Pro-Calcitonin  No results for input(s): PCT in the last 168 hours. Cardiac  No results for input(s): TROP, PBNP in the last 168 hours. Creatinine Kinase  No results for input(s): CK in the last 168 hours.     Inflammatory Markers  No Delirium superimposed on mild cognitive impairment  -d/w son in depth. Pt has had neurocognitive decline and lack of motivation over last 15 months.  Correlates with his worsening DM control w/ a1c going from 7's to over 10.   -consult neuropsych for evalua

## 2021-04-23 NOTE — PLAN OF CARE
Assumed care at 0700. Pt A/O x4 but very forgetful. RA. CPAP at night. NSR on tele. VSS. To the OR for L big toe amputation. PRN Morphine given for pain.   PT to re eval.

## 2021-04-23 NOTE — PLAN OF CARE
Assumed care at [facility]. A/Ox4, forgetful and irritable at times but cooperative. Denies pain/discomfort. LLE red and swollen. IV abx. L 2nd toe with betadine and gauze. Plan for OR at 0930 for L 2nd toe amputation. Consents signed and in patient chart. from fall injury  Description: INTERVENTIONS:  - Assess pt frequently for physical needs  - Identify cognitive and physical deficits and behaviors that affect risk of falls.   - Ethel fall precautions as indicated by assessment.  - Educate pt/family on

## 2021-04-23 NOTE — PROGRESS NOTES
[facility] Cardiology Progress Note - Makayla Andrade Patient Status:  Inpatient    1945 MRN [id number]   [facility] 7NE-A Attending Megan Cohen MD   Hosp Day # 7 PCP TRISTAN POZO, DO     Subjective:  S disease) stage 4, GFR 15-29 ml/min (HCC)     Right heart failure (HCC)     Panlobular emphysema (HCC)     Type 2 diabetes mellitus with stage 4 chronic kidney disease, without long-term current use of insulin (HCC)     Chest pain, atypical     Elevated tro MCV 89.8 90.3 90.7   .0 234.0 212.0       Recent Labs   Lab 04/17/21  0526 04/19/21  0459 04/21/21  0520 04/23/21  0535   * 135* 138 139   K 4.1 4.1 4.1 3.8    103 104 105   CO2 26.0 25.0 26.0 26.0   BUN 34* 35* 37* 33*   CREATSERUM 2. oral liquid 30 g, 30 g, Oral, Q15 Min PRN   Or  Glucose-Vitamin C (DEX-4) chewable tab 8 tablet, 8 tablet, Oral, Q15 Min PRN  Heparin Sodium (Porcine) 5000 UNIT/ML injection 5,000 Units, 5,000 Units, Subcutaneous, Q8H [facility]  melatonin tab 3 mg, 3 mg, Oral, N

## 2021-04-23 NOTE — OPERATIVE REPORT
[facility]    PATIENT'S NAME: Mayuri Serrano   ATTENDING PHYSICIAN: Rc Ho M.D. OPERATING PHYSICIAN: Alisson Arellano D.P.M.    PATIENT ACCOUNT#:   [de-identified]    LOCATION:  [address] [place] [address]  MEDICAL RECORD #:   [id number]       DATE OF DARSHAN and neck area. It was carried distally, converging around the toe. The incision was deepened using both sharp and blunt technique. Superficial veins were ligated, cauterized and retracted as necessary.   The plane of the bone was found back to the level

## 2021-04-23 NOTE — CM/SW NOTE
Care Progression Note:  Active Acute Medical Issue:   Cellulitis of left lower extremity     Other Contributing Medical Factors/Dx. : AAA graft, infra renal aneurysm, anxiety, artherosclerosis of coronary artery, bifem atherosclerotic iliac, CAD, prostate C

## 2021-04-23 NOTE — ANESTHESIA POSTPROCEDURE EVALUATION
[address] [facility] Patient Status:  Inpatient   Age/Gender 67year old male MRN [id number]   [facility] SURGERY Attending Hannah Rai MD   Hosp Day # 7 PCP TRISTAN POZO,        Anesthesia Post-op Note    PARTIAL AM

## 2021-04-23 NOTE — PROGRESS NOTES
[facility]  Nephrology Progress Note    Geo Antonio Patient Status:  Inpatient    1945 MRN [id number]   [facility] 7NE-A Attending Norma Leung MD   Hosp Day # 7 PCP TRISTAN POZO,        SUBJECTIVE:  S/p toe amp LDH in the last 168 hours.     Invalid input(s): ALPHOS, TBIL, DBIL, TPROT    Recent Labs   Lab 04/22/21  1634 04/22/21 2011 04/23/21  0739 04/23/21  1031 04/23/21  1228   PGLU 160* 233* 187* 189* 187*       Meds:   [MAR Hold] insulin detemir (LEVEMIR) 100 Heparin Sodium (Porcine) 5000 UNIT/ML injection 5,000 Units, 5,000 Units, Subcutaneous, Q8H [address]  [MAR Hold] melatonin tab 3 mg, 3 mg, Oral, Nightly PRN  [MAR Hold] morphINE sulfate (PF) 2 MG/ML injection 1 mg, 1 mg, Intravenous, Q2H PRN   Or  [MAR Hold] mo mickey Ash  4/23/2021  12:55 PM

## 2021-04-24 PROCEDURE — 99232 SBSQ HOSP IP/OBS MODERATE 35: CPT | Performed by: HOSPITALIST

## 2021-04-24 PROCEDURE — 99232 SBSQ HOSP IP/OBS MODERATE 35: CPT | Performed by: INTERNAL MEDICINE

## 2021-04-24 RX ORDER — TRAMADOL HYDROCHLORIDE 50 MG/1
50 TABLET ORAL EVERY 6 HOURS PRN
Status: DISCONTINUED | OUTPATIENT
Start: 2021-04-24 | End: 2021-04-27

## 2021-04-24 RX ORDER — HYDROCODONE BITARTRATE AND ACETAMINOPHEN 5; 325 MG/1; MG/1
1 TABLET ORAL EVERY 6 HOURS PRN
Status: DISCONTINUED | OUTPATIENT
Start: 2021-04-24 | End: 2021-04-27

## 2021-04-24 RX ORDER — ACETAMINOPHEN 325 MG/1
650 TABLET ORAL EVERY 6 HOURS PRN
Status: DISCONTINUED | OUTPATIENT
Start: 2021-04-24 | End: 2021-04-27

## 2021-04-24 NOTE — PLAN OF CARE
Assumed care at 0700. Pt A/O x4 but very forgetful. RA. CPAP at night. NSR on tele. VSS. PT recommending JO. Updated pt son over the phone, pt son agreeable to JO. Pain well controlled with PRN Tramadol.  Pt states, \"That new pain med is really help

## 2021-04-24 NOTE — PROGRESS NOTES
[facility]  Nephrology Progress Note    Rita Serrano Patient Status:  Inpatient    1945 MRN [id number]   [facility] 7NE-A Attending Aguilar Guthrie MD   Hosp Day # 8 PCP TRISTAN POZO,        SUBJECTIVE:  Stable this A AMYLASE, LIPASE, LDH in the last 168 hours.     Invalid input(s): ALPHOS, TBIL, DBIL, TPROT    Recent Labs   Lab 04/23/21  1031 04/23/21  1228 04/23/21  1727 04/23/21  2108 04/24/21  0738   PGLU 189* 187* 271* 245* 226*       Meds:   acetaminophen (TYLENOL) 5000 UNIT/ML injection 5,000 Units, 5,000 Units, Subcutaneous, Q8H [address]  melatonin tab 3 mg, 3 mg, Oral, Nightly PRN  morphINE sulfate (PF) 2 MG/ML injection 1 mg, 1 mg, Intravenous, Q2H PRN   Or  morphINE sulfate (PF) 2 MG/ML injection 2 mg, 2 mg, Intraven

## 2021-04-24 NOTE — PHYSICAL THERAPY NOTE
PHYSICAL THERAPY EVALUATION - INPATIENT     Room Number: 7387/8900-Z  Evaluation Date: 4/24/2021  Type of Evaluation: Initial  Physician Order: PT Eval and Treat    Presenting Problem: s/p partial amputation 2nd toe L foot 4/23/21  Reason for Therapy disease ([facility] [address]     not sure when last episode was- many years ago   • COPD (chronic obstructive pulmonary disease) ([facility])     no O2   • Coronary atherosclerosis of unspecified type of vessel, native or graft 3/26/2012   • Coronary disease     s/p LAD stent • ANGIOGRAM     • CARDIAC CATHERTERIZATION (PBP)  09/28/2007    left heart cath,liclat selective coronary angiogram,left iliofemoral angiogram   • CATH BARE METAL STENT (BMS)     • HEMORRHOIDECTOMY  9/6/07   • OTHER      AAA repair   • OTHER SURGICAL HIS tested due to surgery    Lower extremity strength is within functional limits except L ankle due to surgery     BALANCE  Static Sitting: Fair  Dynamic Sitting: Fair  Static Standing: Fair -  Dynamic Standing: Poor +    ADDITIONAL TESTS Pt sit to stand from EOB to RW with min assist and max verbal cues for hand placement and technique. Pt amb x 10 feet with RW , min assist, and max verbal cues for PWB 50% on L foot.   Pt reports he was unable to amb further due to pain and requested to si Recommendations: Sub-acute rehabilitation    PLAN  PT Treatment Plan: Energy conservation;Patient education; Family education;Gait training;Neuromuscular re-educate;Strengthening;Stoop training;Stair training;Transfer training;Balance training  Rehab Potent

## 2021-04-24 NOTE — PROGRESS NOTES
04/24/21  0730   BP: 120/53   Pulse: 77   Resp: 12   Temp: 98.9 °F (37.2 °C)   Postoperative day # 1  Patient sitting in his chair feet in a dependant position. No c/o pain fever or chills    Objective: erythema and edema to the left LE markedly improved.

## 2021-04-24 NOTE — PLAN OF CARE
Assumed care 1900  Patient oriented x3-4  Patient requesting morphine every 2 hours for pain at amputation site  patient became more confused, trying to get out of bed, still complaining of pain- per physician will avoid morphine and give tylenol

## 2021-04-24 NOTE — PROGRESS NOTES
[facility]  Progress Note     Zeeshan Negron Patient Status:  Observation    1945 MRN [id number]   [facility] 3SW-A Attending Jorge Arteaga MD   Hosp Day # 8 PCP Tricia Connors DO     Chief Complaint: left ankle cellulit Pro-Calcitonin  No results for input(s): PCT in the last 168 hours. Cardiac  No results for input(s): TROP, PBNP in the last 168 hours. Creatinine Kinase  No results for input(s): CK in the last 168 hours.     Inflammatory Markers  No results fo History of CAD hypertension hyperlipidemia  -Continue all home medicines     6. Acute Delirium superimposed on mild cognitive impairment  -d/w son in depth. Pt has had neurocognitive decline and lack of motivation over last 15 months.  Correlates with his w

## 2021-04-25 PROCEDURE — 99232 SBSQ HOSP IP/OBS MODERATE 35: CPT | Performed by: INTERNAL MEDICINE

## 2021-04-25 PROCEDURE — 99232 SBSQ HOSP IP/OBS MODERATE 35: CPT | Performed by: HOSPITALIST

## 2021-04-25 RX ORDER — SENNOSIDES 8.6 MG
8.6 TABLET ORAL 2 TIMES DAILY
Status: DISCONTINUED | OUTPATIENT
Start: 2021-04-25 | End: 2021-04-27

## 2021-04-25 NOTE — PROGRESS NOTES
[facility]  Progress Note     Nayora Victor M Patient Status:  Observation    1945 MRN [id number]   [facility] 3SW-A Attending Kana Guadalupe MD   Hosp Day # 9 PCP Roberto Aquino DO     Chief Complaint: left ankle cellulit last 168 hours. Cardiac  No results for input(s): TROP, PBNP in the last 168 hours. Creatinine Kinase  No results for input(s): CK in the last 168 hours. Inflammatory Markers  No results for input(s): CRP, ROGELIO, LDH, DDIMER in the last 168 hours. cognitive impairment  -d/w son in depth. Pt has had neurocognitive decline and lack of motivation over last 15 months. Correlates with his worsening DM control w/ a1c going from 7's to over 10.   -consult neuropsych for evaluation.  Note reviewed and apprec

## 2021-04-25 NOTE — PLAN OF CARE
Assumed care at 0700  Patient alert, oriented x4  R toe dressing changed by Dr. Dillon Colon  Tramadol given for pain  ID consulted for abx discharge recs. Discussed with Dr. Imani Cadet  Up to chair  Family agreeable to JO.  Son and wife will discuss with social wor

## 2021-04-25 NOTE — PROGRESS NOTES
[facility]  Nephrology Progress Note    Patrick Oquendo Patient Status:  Inpatient    1945 MRN [id number]   [facility] 7NE-A Attending yT Brooke MD   Hosp Day # 9 PCP TRISTAN POZO,        SUBJECTIVE:  Stable this A CREATSERUM 2.17* 2.36* 2.20* 2.36* 2.37*   CA 8.4* 8.8 8.8 8.7 8.5   * 81 181* 205* 148*       No results for input(s): ALT, AST, ALB, AMYLASE, LIPASE, LDH in the last 168 hours.     Invalid input(s): ALPHOS, TBIL, DBIL, TPROT    Recent Labs   Lab Intravenous, Q15 Min PRN   Or  glucose (DEX4) oral liquid 30 g, 30 g, Oral, Q15 Min PRN   Or  Glucose-Vitamin C (DEX-4) chewable tab 8 tablet, 8 tablet, Oral, Q15 Min PRN  Heparin Sodium (Porcine) 5000 UNIT/ML injection 5,000 Units, 5,000 Units, Subcutaneo

## 2021-04-25 NOTE — PROGRESS NOTES
04/25/21  0925   BP: 122/56   Pulse: 80   Resp: 20   Temp: 98 °F (36.7 °C)   Patient was seen resting comfortably in bed he said he has been trying to keep his foot elevated and stay off it. The new pain medication that he is on is working well he said.

## 2021-04-26 PROCEDURE — 99232 SBSQ HOSP IP/OBS MODERATE 35: CPT | Performed by: HOSPITALIST

## 2021-04-26 PROCEDURE — 99232 SBSQ HOSP IP/OBS MODERATE 35: CPT | Performed by: INTERNAL MEDICINE

## 2021-04-26 RX ORDER — CEPHALEXIN 500 MG/1
500 CAPSULE ORAL 2 TIMES DAILY
Qty: 14 CAPSULE | Refills: 0 | Status: SHIPPED | OUTPATIENT
Start: 2021-04-26 | End: 2021-04-27

## 2021-04-26 RX ORDER — TORSEMIDE 20 MG/1
60 TABLET ORAL DAILY
Qty: 30 TABLET | Refills: 0 | Status: SHIPPED | OUTPATIENT
Start: 2021-04-27 | End: 2021-04-27

## 2021-04-26 NOTE — PROGRESS NOTES
[facility]  Progress Note     Aichayohannes Jere Patient Status:  Inpatient    1945 MRN [id number]   [facility] 7NE-A Attending Neil Lamas, MD   Hosp Day # 8 PCP Lj Carrera DO     Chief Complaint: redness warmth pain Pro-Calcitonin  No results for input(s): PCT in the last 168 hours. Cardiac  No results for input(s): TROP, PBNP in the last 168 hours. Creatinine Kinase  No results for input(s): CK in the last 168 hours.     Inflammatory Markers  No results fo all home medicines      6. Acute Delirium superimposed on mild cognitive impairment  -d/w son in depth. Pt has had neurocognitive decline and lack of motivation over last 15 months. Correlates with his worsening DM control w/ a1c going from 7's to over 10.

## 2021-04-26 NOTE — PHYSICAL THERAPY NOTE
PHYSICAL THERAPY TREATMENT NOTE - INPATIENT    Room Number: 3765/6792-M     Session: 2  Number of Visits to Meet Established Goals: 5    Presenting Problem: s/p partial amputation 2nd toe L foot 4/23/21  History related to current admission: pt admitted t pulmonary disease) ([facility] [address]     no O2   • Coronary atherosclerosis of unspecified type of vessel, native or graft 3/26/2012   • Coronary disease     s/p LAD stent   • Depression    • Disorder of thyroid    • Dysuria 11/5/07    w/burning   • Echocardiogram abn selective coronary angiogram,left iliofemoral angiogram   • CATH BARE METAL STENT (BMS)     • HEMORRHOIDECTOMY  9/6/07   • OTHER      AAA repair   • OTHER SURGICAL HISTORY  9/07    patent right coronary artery stent   • OTHER SURGICAL HISTORY      rt catar time  Stoop/Curb Assistance: Not tested       Skilled Therapy Provided: Patient was received in chair. Educated on wb restriction of 50% wb on LLE and positioning on pillow when at rest.   Sit <>stand with mod assist of one.  Four times during session, cons independent      Goal #3 Patient is able to ambulate 50 feet with assist device and PWB 50% L LE: walker - rolling at assistance level: supervision      Goal #4 Pt able to ascend/descend 1 flight of stairs with one handrail PWB 50% L foot with min assist.

## 2021-04-26 NOTE — CM/SW NOTE
Patient's spouse and son Xavi Todd arrived at the hospital requesting to speak with MSW. Son Xavi Todd had multiple concerns in regards to the hospital stay but did not want to speak with the manager.   MSW instructed where the patient advocacy line was but he

## 2021-04-26 NOTE — CONSULTS
INFECTIOUS DISEASE [facility] Patient Status:  Inpatient    1945 MRN [id number]   [facility] 7NE-A Attending Jamison Murdock MD   Hosp Day # 10 PCP TRISTAN SHAH stone   • Heart attack [facility])    • Hemorrhoids    • High blood pressure    • High cholesterol    • History of noncompliance with medical treatment 9/07   • Hypercholesterolemia    • Hyperlipidemia    • Hypokalemia 9/2007   • Leukocytosis 11/09/2007   • [facility] [place] drugs. Allergies:     Ace Inhibitors          Coughing    Medications:    Current Facility-Administered Medications:   •  Senna (SENOKOT) tab 8.6 mg, 8.6 mg, Oral, BID  •  insulin detemir (LEVEMIR) 100 UNIT/ML flextouch 50 Units, 50 Units, Subcutaneous (Porcine) 5000 UNIT/ML injection 5,000 Units, 5,000 Units, Subcutaneous, Q8H [address]  •  melatonin tab 3 mg, 3 mg, Oral, Nightly PRN  •  morphINE sulfate (PF) 2 MG/ML injection 1 mg, 1 mg, Intravenous, Q2H PRN **OR** morphINE sulfate (PF) 2 MG/ML injection 2 m mouth daily. , Disp: , Rfl:   torsemide 20 MG Oral Tab, Take 60 mg by mouth daily. , Disp: , Rfl:   gabapentin 300 MG Oral Cap, Take 300 mg by mouth 3 (three) times daily. , Disp: , Rfl:   acetaminophen 325 MG Oral Tab, Take 650 mg by mouth every 6 (six) hour reviewed      Recent Labs   Lab 04/23/21  0535   RBC 3.75*   HGB 11.4*   HCT 34.0*   MCV 90.7   MCH 30.4   MCHC 33.5   RDW 14.1   NEPRELIM 4.67   WBC 7.9   .0       Recent Labs   Lab 04/23/21  0535 04/24/21  0541 04/25/21  0527   * 205* 148* bronchitis type ([facility] [address]     COPD exacerbation (HCC)     Benign essential HTN     Diabetes mellitus type 2 in obese [facility])     CAD in native artery     Mixed hyperlipidemia     At risk for falling     Congestive heart failure (CHF) ([facility] [address]     Essential hyperten

## 2021-04-26 NOTE — PROGRESS NOTES
[facility]  Nephrology Progress Note    Fabby Girard Patient Status:  Inpatient    1945 MRN [id number]   [facility] 7NE-A Attending Hannah Rai MD   Hosp Day # 10 PCP TRISTAN POZO, DO       SUBJECTIVE:  Up in chair, 148*       No results for input(s): ALT, AST, ALB, AMYLASE, LIPASE, LDH in the last 168 hours.     Invalid input(s): ALPHOS, TBIL, DBIL, TPROT    Recent Labs   Lab 04/25/21  0912 04/25/21  1245 04/25/21  1542 04/25/21  1959 04/26/21  0753   PGLU 185* 241* 1 Or  Glucose-Vitamin C (DEX-4) chewable tab 8 tablet, 8 tablet, Oral, Q15 Min PRN  Heparin Sodium (Porcine) 5000 UNIT/ML injection 5,000 Units, 5,000 Units, Subcutaneous, Q8H [facility]  melatonin tab 3 mg, 3 mg, Oral, Nightly PRN  morphINE sulfate (PF) 2 MG/ML in

## 2021-04-26 NOTE — CM/SW NOTE
MAMADOU spoke with the patient's son [facility] [phone] who is the patient's stated surrogate in regards to dc planning. [facility] specified they would like Alfred [facility] for JO. Referral made and DON screen called.   MAMADOU also spoke with the patient's wif

## 2021-04-26 NOTE — CM/SW NOTE
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 :   AAA graft, i

## 2021-04-27 VITALS
HEART RATE: 79 BPM | WEIGHT: 310.19 LBS | OXYGEN SATURATION: 98 % | SYSTOLIC BLOOD PRESSURE: 136 MMHG | BODY MASS INDEX: 38.57 KG/M2 | RESPIRATION RATE: 20 BRPM | DIASTOLIC BLOOD PRESSURE: 47 MMHG | TEMPERATURE: 98 F | HEIGHT: 75 IN

## 2021-04-27 PROCEDURE — 99232 SBSQ HOSP IP/OBS MODERATE 35: CPT | Performed by: INTERNAL MEDICINE

## 2021-04-27 PROCEDURE — 99239 HOSP IP/OBS DSCHRG MGMT >30: CPT | Performed by: HOSPITALIST

## 2021-04-27 RX ORDER — LEVOTHYROXINE SODIUM 0.1 MG/1
100 TABLET ORAL
Qty: 30 TABLET | Refills: 1 | Status: SHIPPED | OUTPATIENT
Start: 2021-04-27 | End: 2021-05-26

## 2021-04-27 RX ORDER — LEVOTHYROXINE SODIUM 0.2 MG/1
200 TABLET ORAL
Qty: 90 TABLET | Refills: 1 | Status: SHIPPED | OUTPATIENT
Start: 2021-04-27 | End: 2021-10-06

## 2021-04-27 RX ORDER — CEPHALEXIN 500 MG/1
500 CAPSULE ORAL 2 TIMES DAILY
Qty: 14 CAPSULE | Refills: 0 | Status: SHIPPED | OUTPATIENT
Start: 2021-04-27 | End: 2021-05-04

## 2021-04-27 NOTE — CM/SW NOTE
04/27/21 1119   Discharge disposition   Expected discharge disposition Skilled Nurs   Name of Aj Knapp   Patient is Discharged to a [address] [place] [address] Yes   Discharge transportation [facility]

## 2021-04-27 NOTE — PLAN OF CARE
Assumed care at 0700  Patient alert, oriented x3-4  Tramadol given for pain x2  Worked with PT.  Up to chair for most of day  R toe dressing c/d/i    [facility]ing [facility]  Wife and son updated on plan of care

## 2021-04-27 NOTE — PLAN OF CARE
Assumed patient care at 0730 .  VSS   No pain reported   Patient alert x3-4, forgetful, needing more orientation throughout the day buy easily redirected   Fall precautions maintained   Dressing to left foot intact, instructions noted for twice weekly manuel

## 2021-04-27 NOTE — PLAN OF CARE
[facility] care 1900  Patient alert, oriented to self, confused, irritable  fall prevention interventions in place  CPAP at night  Denies pain

## 2021-04-27 NOTE — CM/SW NOTE
Pt has been medically cleared for dc today. Pt will go to [facility] for JO. Called Jazmin to coordinate dc time. RN to call report to [phone].   Pt will needs a medicar for transport - son is agreeable and understands the cost.  [facility] i

## 2021-04-27 NOTE — DIETARY NOTE
[address]     Admitting diagnosis:  Hyponatremia [E87.1]  Hyperglycemia [R73.9]  Cellulitis of left lower extremity [K17.516]    Ht: 190.5 cm (6' 3\")  Wt: (!) 140.7 kg (310 lb 3 oz). Body mass index is 38. 7

## 2021-04-27 NOTE — PROGRESS NOTES
[facility]  Nephrology Progress Note    Geo Antonio Patient Status:  Inpatient    1945 MRN [id number]   [facility] 7NE-A Attending Norma Leung MD   Hosp Day # 6 PCP TRISTAN POZO,        SUBJECTIVE:    No acute c g, Oral, Q15 Min PRN   Or  Glucose-Vitamin C (DEX-4) chewable tab 8 tablet, 8 tablet, Oral, Q15 Min PRN  Heparin Sodium (Porcine) 5000 UNIT/ML injection 5,000 Units, 5,000 Units, Subcutaneous, Q8H [facility]  melatonin tab 3 mg, 3 mg, Oral, Nightly PRN  morphINE lb (142.9 kg)  02/08/21 1607 : (!) 312 lb (141.5 kg)  11/06/20 0813 : (!) 314 lb (142.4 kg)    General: Alert and in no apparent distress.   HEENT: No scleral icterus, MMM  Neck: Supple, no JANET or thyromegaly  Cardiac: Regular rate and rhythm, S1, S2 normal

## 2021-04-28 ENCOUNTER — TELEPHONE (OUTPATIENT)
Dept: PODIATRY CLINIC | Facility: CLINIC | Age: 76
End: 2021-04-28

## 2021-04-28 NOTE — CM/SW NOTE
Pt's wife Castro Carmela called to say that she wanted pt to go to [address] in [place] not [facility]. Checked pt's notes which indicate [facility] was the facility of choice. Wife claims they told Cherylr on Monday that they wanted [facility].   Apologized

## 2021-04-29 ENCOUNTER — MED REC SCAN ONLY (OUTPATIENT)
Dept: FAMILY MEDICINE CLINIC | Facility: CLINIC | Age: 76
End: 2021-04-29

## 2021-04-29 NOTE — DISCHARGE SUMMARY
[facility]  DISCHARGE SUMMARY     Francisco Javier Dow Patient Status:  Inpatient    1945 MRN [id number]   [facility] 7NE-A Attending No att. providers found   Hosp Day # 6 PCP TRISTAN POZO DO     Date of Admission:  of carbohydrates eaten into the skin 3 (three) times daily with meals.   Do not give if patient is NPO   Refills: 0     Insulin Aspart Pen 100 UNIT/ML Sopn  Commonly known as: NOVOLOG      Inject 1 unit Novolog insulin for every 20 points blood glucose is g SYNTHROID      Take 1 tablet (100 mcg total) by mouth before breakfast.   Quantity: 30 tablet  Refills: 1     losartan 100 MG Tabs  Commonly known as: COZAAR      Take 1 tablet (100 mg total) by mouth daily.    Quantity: 90 tablet  Refills: 3     Metoprolol [facility] [address]  [phone]  Schedule an appointment as soon as possible for a visit  after discharge from [facility], [address] [place] [place] [address]  [facility]  [phone]    In 1 month      Appointmen

## 2021-05-04 NOTE — TELEPHONE ENCOUNTER
Please see below. Pt was transferred to [facility] nursing/rehab. New facility is requesting a call with care instructions. P: [phone]    Please advise on orders. Thank you.

## 2021-05-04 NOTE — TELEPHONE ENCOUNTER
Per Azam Walters pt was transferred to [facility], needs orders. Please call thank you.

## 2021-05-04 NOTE — TELEPHONE ENCOUNTER
Noted. Dr. Casillas Poll may I offer 0845 on Monday 5/10 in [place]? I notice the slot is on hold so did not know if OK to offer. Otherwise can double book at another time. Thanks.

## 2021-05-04 NOTE — TELEPHONE ENCOUNTER
Noted, discussed with podiatry staff. Was told may double book in [place] on the hour in slot marked yellow. May not use red slot. I called the patient's nurse Guido Goel at [facility]. Scheduled appt 11 am on 5/11/21 [facility].  Office address provided

## 2021-05-04 NOTE — TELEPHONE ENCOUNTER
I called and spoke to [facility] nurse at his new facility giving her the instructions for offloading and dressing changes. I should see him within a week.

## 2021-05-11 ENCOUNTER — MED REC SCAN ONLY (OUTPATIENT)
Dept: FAMILY MEDICINE CLINIC | Facility: CLINIC | Age: 76
End: 2021-05-11

## 2021-05-11 ENCOUNTER — OFFICE VISIT (OUTPATIENT)
Dept: PODIATRY CLINIC | Facility: CLINIC | Age: 76
End: 2021-05-11
Payer: MEDICARE

## 2021-05-11 DIAGNOSIS — Z98.890 STATUS POST FOOT SURGERY: Primary | ICD-10-CM

## 2021-05-11 PROCEDURE — 99024 POSTOP FOLLOW-UP VISIT: CPT | Performed by: PODIATRIST

## 2021-05-11 NOTE — PROGRESS NOTES
Patrick Oquendo is a 67year old male. Patient presents with:  Post-Op: left foot sx on 4/23/21 - PARTIAL AMPUTATIONOF 2ND TOE LEFT FOOT- denies any foot pain.         HPI:   Patient returns to the clinic is now 3 weeks status post surgery overall doing we losartan 100 MG Oral Tab Take 1 tablet (100 mg total) by mouth daily. 90 tablet 3   • simvastatin 40 MG Oral Tab Take 1 tablet (40 mg total) by mouth nightly. 90 tablet 3   • finasteride 5 MG Oral Tab Take 1 tablet (5 mg total) by mouth daily.  90 tablet 1 prostate ([facility] 3/26/2012    pt does not want radiation   • Metabolic syndrome    • Myocardial infarct ([facility] 9/07    w/3 vessel coronary artery disease, s/ stent x3 and RCA.    • Neuropathy    • Obese    • BRITNEY on CPAP    • Panic disorder     sees longtime ps cut down to 1 pack every 3 days     Vaping Use      Vaping Use: Never used    Substance and Sexual Activity      Alcohol use: No        Alcohol/week: 0.0 standard drinks      Drug use: No      Sexual activity: Not Currently    Other Topics      Concerns:

## 2021-05-18 ENCOUNTER — OFFICE VISIT (OUTPATIENT)
Dept: PODIATRY CLINIC | Facility: CLINIC | Age: 76
End: 2021-05-18
Payer: MEDICARE

## 2021-05-18 DIAGNOSIS — E66.9 DIABETES MELLITUS TYPE 2 IN OBESE (HCC): ICD-10-CM

## 2021-05-18 DIAGNOSIS — E11.69 DIABETES MELLITUS TYPE 2 IN OBESE (HCC): ICD-10-CM

## 2021-05-18 DIAGNOSIS — E11.65 TYPE 2 DIABETES MELLITUS WITH HYPERGLYCEMIA, WITHOUT LONG-TERM CURRENT USE OF INSULIN (HCC): ICD-10-CM

## 2021-05-18 DIAGNOSIS — Z98.890 STATUS POST FOOT SURGERY: Primary | ICD-10-CM

## 2021-05-18 PROCEDURE — 99024 POSTOP FOLLOW-UP VISIT: CPT | Performed by: PODIATRIST

## 2021-05-18 NOTE — PROGRESS NOTES
Nora Chowdhury is a 67year old male. Patient presents with:  Post-Op: left foot sx on 4/23/21, amputation - denies any pain. HPI:   Patient returns to the clinic now about a month postoperative overall doing well sutures removed last visit.   At t MG Oral Tab Take 1 tablet (100 mg total) by mouth daily. 90 tablet 3   • simvastatin 40 MG Oral Tab Take 1 tablet (40 mg total) by mouth nightly. 90 tablet 3   • finasteride 5 MG Oral Tab Take 1 tablet (5 mg total) by mouth daily.  90 tablet 1   • Metoprolo 3/26/2012    pt does not want radiation   • Metabolic syndrome    • Myocardial infarct ([facility] [address] 9/07    w/3 vessel coronary artery disease, s/ stent x3 and RCA.    • Neuropathy    • Obese    • BRITNEY on CPAP    • Panic disorder     sees longtime psychiatrist   • pack every 3 days     Vaping Use      Vaping Use: Never used    Substance and Sexual Activity      Alcohol use: No        Alcohol/week: 0.0 standard drinks      Drug use: No      Sexual activity: Not Currently    Other Topics      Concerns:        Caffeine understanding of these issues and agrees to the plan.     Alphonso Becker DPM

## 2021-05-25 DIAGNOSIS — E03.9 HYPOTHYROIDISM, UNSPECIFIED: ICD-10-CM

## 2021-05-27 RX ORDER — LEVOTHYROXINE SODIUM 0.1 MG/1
TABLET ORAL
Qty: 90 TABLET | Refills: 0 | Status: SHIPPED | OUTPATIENT
Start: 2021-05-27 | End: 2021-10-29

## 2021-06-03 ENCOUNTER — OFFICE VISIT (OUTPATIENT)
Dept: PODIATRY CLINIC | Facility: CLINIC | Age: 76
End: 2021-06-03
Payer: MEDICARE

## 2021-06-03 DIAGNOSIS — Z98.890 STATUS POST FOOT SURGERY: Primary | ICD-10-CM

## 2021-06-03 DIAGNOSIS — E11.69 DIABETES MELLITUS TYPE 2 IN OBESE (HCC): ICD-10-CM

## 2021-06-03 DIAGNOSIS — Z89.422 HISTORY OF PARTIAL RAY AMPUTATION OF SECOND TOE OF LEFT FOOT (HCC): ICD-10-CM

## 2021-06-03 DIAGNOSIS — E66.9 DIABETES MELLITUS TYPE 2 IN OBESE (HCC): ICD-10-CM

## 2021-06-03 DIAGNOSIS — E11.65 TYPE 2 DIABETES MELLITUS WITH HYPERGLYCEMIA, WITHOUT LONG-TERM CURRENT USE OF INSULIN (HCC): ICD-10-CM

## 2021-06-03 DIAGNOSIS — E11.42 DIABETIC PERIPHERAL NEUROPATHY (HCC): ICD-10-CM

## 2021-06-03 PROCEDURE — 99024 POSTOP FOLLOW-UP VISIT: CPT | Performed by: PODIATRIST

## 2021-06-03 NOTE — PROGRESS NOTES
Rom Pinedo is a 67year old male.  Patient presents with:  Post-Op: s/p L 2nd toe  amputation f/u- had sx on 4/23/21 - no pain - this AM he dose not remember how much his BS was         HPI:   Patient returns to the clinic he is now approximately 6 we total) by mouth 3 (three) times daily. 60 tablet 2   • losartan 100 MG Oral Tab Take 1 tablet (100 mg total) by mouth daily. 90 tablet 3   • simvastatin 40 MG Oral Tab Take 1 tablet (40 mg total) by mouth nightly.  90 tablet 3   • finasteride 5 MG Oral Tab • Leukocytosis 11/09/2007   • Malignant neoplasm of prostate ([facility] [address] 3/26/2012    pt does not want radiation   • Metabolic syndrome    • Myocardial infarct ([facility] [address] 9/07    w/3 vessel coronary artery disease, s/ stent x3 and RCA.    • Neuropathy    • Obese Smokeless tobacco: Never Used      Tobacco comment: has cut down to 1 pack every 3 days     Vaping Use      Vaping Use: Never used    Substance and Sexual Activity      Alcohol use: No        Alcohol/week: 0.0 standard drinks      Drug use: No      Sexual [phone]  . 2. I would like to see the patient in another 4 to 6 weeks his family can bring him  . The patient now has a prescription for diabetic shoes to [facility]. The patient indicates understanding of these issues and agrees to the plan.

## 2021-06-09 DIAGNOSIS — E11.22 TYPE 2 DIABETES MELLITUS WITH DIABETIC CHRONIC KIDNEY DISEASE, UNSPECIFIED CKD STAGE, UNSPECIFIED WHETHER LONG TERM INSULIN USE (HCC): Primary | ICD-10-CM

## 2021-06-10 RX ORDER — GLIPIZIDE 10 MG/1
10 TABLET ORAL DAILY
Qty: 90 TABLET | Refills: 0 | Status: SHIPPED | OUTPATIENT
Start: 2021-06-10 | End: 2021-09-02

## 2021-06-21 ENCOUNTER — NURSING HOME VISIT (OUTPATIENT)
Dept: NEPHROLOGY | Age: 76
End: 2021-06-21

## 2021-06-21 VITALS
TEMPERATURE: 98.2 F | DIASTOLIC BLOOD PRESSURE: 74 MMHG | OXYGEN SATURATION: 95 % | BODY MASS INDEX: 39.17 KG/M2 | HEART RATE: 60 BPM | SYSTOLIC BLOOD PRESSURE: 122 MMHG | WEIGHT: 315 LBS | HEIGHT: 75 IN | RESPIRATION RATE: 16 BRPM

## 2021-06-21 DIAGNOSIS — D63.1 ANEMIA DUE TO STAGE 4 CHRONIC KIDNEY DISEASE (CMD): ICD-10-CM

## 2021-06-21 DIAGNOSIS — R60.0 EDEMA OF BOTH LEGS: ICD-10-CM

## 2021-06-21 DIAGNOSIS — N18.4 CKD (CHRONIC KIDNEY DISEASE) STAGE 4, GFR 15-29 ML/MIN (CMD): Primary | ICD-10-CM

## 2021-06-21 DIAGNOSIS — N18.4 ANEMIA DUE TO STAGE 4 CHRONIC KIDNEY DISEASE (CMD): ICD-10-CM

## 2021-06-21 PROCEDURE — 99309 SBSQ NF CARE MODERATE MDM 30: CPT | Performed by: NURSE PRACTITIONER

## 2021-06-23 ENCOUNTER — NURSING HOME VISIT (OUTPATIENT)
Dept: NEPHROLOGY | Age: 76
End: 2021-06-23

## 2021-06-23 VITALS
SYSTOLIC BLOOD PRESSURE: 141 MMHG | DIASTOLIC BLOOD PRESSURE: 79 MMHG | OXYGEN SATURATION: 99 % | BODY MASS INDEX: 39.2 KG/M2 | WEIGHT: 313.6 LBS | TEMPERATURE: 98.4 F | RESPIRATION RATE: 18 BRPM | HEART RATE: 70 BPM

## 2021-06-23 DIAGNOSIS — R60.0 EDEMA OF BOTH LEGS: ICD-10-CM

## 2021-06-23 DIAGNOSIS — N18.4 CKD (CHRONIC KIDNEY DISEASE) STAGE 4, GFR 15-29 ML/MIN (CMD): Primary | ICD-10-CM

## 2021-06-23 PROCEDURE — 99309 SBSQ NF CARE MODERATE MDM 30: CPT | Performed by: NURSE PRACTITIONER

## 2021-06-24 ENCOUNTER — TELEPHONE (OUTPATIENT)
Dept: FAMILY MEDICINE CLINIC | Facility: CLINIC | Age: 76
End: 2021-06-24

## 2021-06-24 DIAGNOSIS — C61 MALIGNANT NEOPLASM OF PROSTATE (HCC): ICD-10-CM

## 2021-06-24 NOTE — TELEPHONE ENCOUNTER
Please advise of pt's medication changes from rehab. Should he continue with meds below as recommended by rehab for now until Diabetes Ed?

## 2021-06-24 NOTE — TELEPHONE ENCOUNTER
S/W daughter Nisha Schaefer and states pt was d/c'd from rehab last night. Below are few things she wants to know:    1. Torsemide 20 mg 3 tabs daily for swelling was discontinued. Replaced by Bumetamide 2 mg bid.       2.  Metformin 1000 mg 1 tab bid was disconti

## 2021-06-24 NOTE — TELEPHONE ENCOUNTER
He likely needs diabetic education and maintenance. Please refer him to diabetes services at Dr. Luis Stephens office to manage his diabetes.

## 2021-06-24 NOTE — TELEPHONE ENCOUNTER
Amanda Harden given Dr. Cristino Litten, advised to call [facility] to see what brand of machine is covered, and also advised regarding the Bumetanide 2 mg refill, to call pt's Cardiology for further recommendation. Amanda Harden voiced understanding.

## 2021-06-24 NOTE — TELEPHONE ENCOUNTER
Yes, ok to follow their instructions since they saw him last.  [id number] [address] to give him new BS meter.

## 2021-06-25 RX ORDER — FINASTERIDE 5 MG/1
5 TABLET, FILM COATED ORAL DAILY
Qty: 90 TABLET | Refills: 1 | Status: SHIPPED | OUTPATIENT
Start: 2021-06-25

## 2021-06-28 ENCOUNTER — TELEPHONE (OUTPATIENT)
Dept: FAMILY MEDICINE CLINIC | Facility: CLINIC | Age: 76
End: 2021-06-28

## 2021-06-28 NOTE — TELEPHONE ENCOUNTER
Nurse from home health calling to confirm med list.  Said metformin and glipizide are missing from list they have since 4.30, they are unsure if pt has been taking or not.   Reports he was rx'd insulin but not taking, does not feel comfortable giving self i

## 2021-07-01 ENCOUNTER — TELEPHONE (OUTPATIENT)
Dept: FAMILY MEDICINE CLINIC | Facility: CLINIC | Age: 76
End: 2021-07-01

## 2021-07-01 ENCOUNTER — OFFICE VISIT (OUTPATIENT)
Dept: FAMILY MEDICINE CLINIC | Facility: CLINIC | Age: 76
End: 2021-07-01
Payer: MEDICARE

## 2021-07-01 VITALS — DIASTOLIC BLOOD PRESSURE: 74 MMHG | HEART RATE: 84 BPM | SYSTOLIC BLOOD PRESSURE: 124 MMHG | RESPIRATION RATE: 12 BRPM

## 2021-07-01 DIAGNOSIS — C61 MALIGNANT NEOPLASM OF PROSTATE (HCC): ICD-10-CM

## 2021-07-01 DIAGNOSIS — K21.9 GASTROESOPHAGEAL REFLUX DISEASE, UNSPECIFIED WHETHER ESOPHAGITIS PRESENT: ICD-10-CM

## 2021-07-01 DIAGNOSIS — E11.40 UNCONTROLLED TYPE 2 DIABETES WITH NEUROPATHY (HCC): Primary | ICD-10-CM

## 2021-07-01 DIAGNOSIS — N18.4 STAGE 4 CHRONIC KIDNEY DISEASE (HCC): ICD-10-CM

## 2021-07-01 DIAGNOSIS — F41.1 GENERALIZED ANXIETY DISORDER: ICD-10-CM

## 2021-07-01 DIAGNOSIS — E11.65 UNCONTROLLED TYPE 2 DIABETES WITH NEUROPATHY (HCC): Primary | ICD-10-CM

## 2021-07-01 PROCEDURE — 1111F DSCHRG MED/CURRENT MED MERGE: CPT | Performed by: FAMILY MEDICINE

## 2021-07-01 PROCEDURE — 99215 OFFICE O/P EST HI 40 MIN: CPT | Performed by: FAMILY MEDICINE

## 2021-07-01 RX ORDER — LANCETS 33 GAUGE
1 EACH MISCELLANEOUS AS DIRECTED
Qty: 100 EACH | Refills: 5 | Status: SHIPPED | OUTPATIENT
Start: 2021-07-01 | End: 2021-10-29

## 2021-07-01 RX ORDER — GABAPENTIN 600 MG/1
600 TABLET ORAL 2 TIMES DAILY
Qty: 180 TABLET | Refills: 0 | Status: SHIPPED | OUTPATIENT
Start: 2021-07-01 | End: 2021-10-29

## 2021-07-01 RX ORDER — BLOOD-GLUCOSE METER
KIT MISCELLANEOUS
Qty: 1 KIT | Refills: 0 | Status: SHIPPED | OUTPATIENT
Start: 2021-07-01 | End: 2021-10-29 | Stop reason: CLARIF

## 2021-07-01 RX ORDER — LIDOCAINE HCL 4 %
CREAM (GRAM) TOPICAL
Qty: 360 STRIP | Refills: 1 | Status: SHIPPED | OUTPATIENT
Start: 2021-07-01 | End: 2021-10-29

## 2021-07-01 RX ORDER — CLONAZEPAM 2 MG/1
2 TABLET ORAL 3 TIMES DAILY
Qty: 60 TABLET | Refills: 2 | Status: SHIPPED | OUTPATIENT
Start: 2021-07-01 | End: 2021-08-30

## 2021-07-01 RX ORDER — OMEPRAZOLE 40 MG/1
40 CAPSULE, DELAYED RELEASE ORAL DAILY
Qty: 90 CAPSULE | Refills: 0 | Status: SHIPPED | OUTPATIENT
Start: 2021-07-01 | End: 2021-09-21

## 2021-07-02 ENCOUNTER — TELEPHONE (OUTPATIENT)
Dept: FAMILY MEDICINE CLINIC | Facility: CLINIC | Age: 76
End: 2021-07-02

## 2021-07-02 NOTE — TELEPHONE ENCOUNTER
Left ID VM msg to Ian Mae daughter   To consult DM specialist for pt's uncontrolled DM management     Kelli Griggs MD [address] [id number] [phone]

## 2021-07-02 NOTE — TELEPHONE ENCOUNTER
Duyen Knapp, daughter-in-law (on HIPPA) calling to get information on meds ordered for patient yesterday.

## 2021-07-02 NOTE — TELEPHONE ENCOUNTER
Kacey Fung daughter in law wants to clarify plan of care   Seen yesterday, pt with son  But Kacey Fung manages meds   If to start on Insulin?      Pls advise   Thank you

## 2021-07-02 NOTE — TELEPHONE ENCOUNTER
Spoke with pt's son Talisha Brar and informed him that I have made an appt for Frankie at the \[facility]\"", Thursday, 7/8/2021 at 1330 PM.  Address and phone # sent to pt's MyChart per Yovani's request.

## 2021-07-06 NOTE — PROGRESS NOTES
[facility] Office Note  Chief Complaint:   Patient presents with:  Hospital F/U      HPI:   This is a 67year old male coming in for follow up of DM2, CKD, prostate cancer, GERD and anxiety.   Patient was recently hospitalized for stent graft   • Acute sinusitis 1/4/12   • Aneurysm ([address]     large,infrarenal   • Anxiety state    • Atelectasis 11/6/07    on chest x-ray minimal right perihilar    • Atherosclerosis of coronary artery    • Atherosclerotic heart disease 9/27/2007    emmanuel secondary to an enlarged thyroid   • Type II or unspecified type diabetes mellitus without mention of complication, not stated as uncontrolled 10/26/2010   • Unspecified sleep apnea DMG SPLIT 12-13-12    AHI 46 RDI 65 SaO2 87% CPAP 10 [employer]   • Urinary ret MG Oral Tab Take 1 tablet (1,000 mg total) by mouth 2 (two) times daily with meals. 180 tablet 0   • glipiZIDE 10 MG Oral Tab Take 1 tablet (10 mg total) by mouth daily.  90 tablet 0   • LEVOTHYROXINE SODIUM 100 MCG Oral Tab TAKE 1 TABLET BY MOUTH EVERY MOR daily with meals.   Do not give if patient is NPO (Patient not taking: Reported on 7/1/2021 )  0   • Insulin Aspart Pen 100 UNIT/ML Subcutaneous Solution Pen-injector Inject 1 unit Novolog insulin for every 20 points blood glucose is greater than 140 mg/dL Strength intact with 5/5 bilaterally upper and lower extremities, no edema noted  NEURO:  CN 2 - 12 grossly intact     ASSESSMENT AND PLAN:   1.  Generalized anxiety disorder  -  Uncontrolled  -  Restart klonopin 2mg TID PRN  -  F/u if no improvement  - elva PPSV23) Never done  Tobacco Cessation Counseling 2 Years due on 09/16/2018  Annual Physical due on 11/29/2019  PSA due on 12/16/2020  Diabetes Care A1C due on 07/14/2021    Patient/Caregiver Education: Patient/Caregiver Education: There are no barriers to disease) stage 4, GFR 15-29 ml/min (HCC)     Right heart failure (HCC)     Panlobular emphysema (HCC)     Type 2 diabetes mellitus with stage 4 chronic kidney disease, without long-term current use of insulin (HCC)     Chest pain, atypical     Elevated tro

## 2021-07-08 ENCOUNTER — TELEPHONE (OUTPATIENT)
Dept: FAMILY MEDICINE CLINIC | Facility: CLINIC | Age: 76
End: 2021-07-08

## 2021-07-08 ENCOUNTER — NURSE ONLY (OUTPATIENT)
Dept: ENDOCRINOLOGY CLINIC | Facility: CLINIC | Age: 76
End: 2021-07-08
Payer: MEDICARE

## 2021-07-08 VITALS — BODY MASS INDEX: 41 KG/M2 | WEIGHT: 303 LBS

## 2021-07-08 DIAGNOSIS — E11.69 DIABETES MELLITUS TYPE 2 IN OBESE (HCC): ICD-10-CM

## 2021-07-08 DIAGNOSIS — E66.9 DIABETES MELLITUS TYPE 2 IN OBESE (HCC): ICD-10-CM

## 2021-07-08 PROCEDURE — G0108 DIAB MANAGE TRN  PER INDIV: HCPCS | Performed by: DIETITIAN, REGISTERED

## 2021-07-08 NOTE — TELEPHONE ENCOUNTER
PT evaluation reviewed, signed, and faxed to [facility] @ [phone].

## 2021-07-08 NOTE — PROGRESS NOTES
Beatriz Nails  : 1945 attended Step 1 Diabetic Education:  Pt.  Accompanied by wife to visit  Date: 2021  Referring Provider: Dr. Lucy Yi  Start time: 1:30pm  End time: 2:30pm    Wt (!) 303 lb   BMI 41.09 kg/m²     HGBA1C (%)   Date Value Limit carbs to 45-60 grams per meal  Continue current diabetes medications  Return as needed   Written materials provided for all areas covered. Patient verbalized understanding and has no further questions at this time.     Sanjana Richardson, RN, CDE

## 2021-07-09 ENCOUNTER — TELEPHONE (OUTPATIENT)
Dept: FAMILY MEDICINE CLINIC | Facility: CLINIC | Age: 76
End: 2021-07-09

## 2021-07-09 ENCOUNTER — MED REC SCAN ONLY (OUTPATIENT)
Dept: FAMILY MEDICINE CLINIC | Facility: CLINIC | Age: 76
End: 2021-07-09

## 2021-07-09 NOTE — TELEPHONE ENCOUNTER
Patient daughter in law  has questions on diabetes medication. Patient was told to stop taking metformin but was told last appointment that they will start a new medicine? Please call patient back.

## 2021-07-09 NOTE — TELEPHONE ENCOUNTER
Long conversation with Maria L daughter-in-law. Discharged from rehab 2 weeks ago. Saw  7/1/2021 and according to our notes he confirmed taking the long acting insulin at night, short acting with meals, Metformin and Glipizide.  Maria L said Reba cisneros

## 2021-07-11 NOTE — TELEPHONE ENCOUNTER
Patient needs home health daily with nursing to help him with insulin injections and provide injections if necessary. He may be at a point of needing assisted living with nursing care.

## 2021-07-12 NOTE — TELEPHONE ENCOUNTER
LMTCB for daughter-in-law [facility] Verbal Release verified)    Patient has not had a MAW/CPE --> schedule as soon as possible

## 2021-07-14 NOTE — TELEPHONE ENCOUNTER
Call to sonia/daughter in law-she answered call but then could not seem to hear me and disconnected call. Call back to [facility] reaches no answer and no jory mail. Unable to leave message. **please attempt to reach her again tomorrow.

## 2021-07-16 ENCOUNTER — TELEPHONE (OUTPATIENT)
Dept: FAMILY MEDICINE CLINIC | Facility: CLINIC | Age: 76
End: 2021-07-16

## 2021-07-16 ENCOUNTER — TELEPHONE (OUTPATIENT)
Dept: ENDOCRINOLOGY CLINIC | Facility: CLINIC | Age: 76
End: 2021-07-16

## 2021-07-16 NOTE — TELEPHONE ENCOUNTER
Noted. greg shows bon brown/CCM supervisor in a meeting. Will try to contact her yet today re any CCM options.

## 2021-07-16 NOTE — TELEPHONE ENCOUNTER
Call to [facility]/population health [phone] reaching [employer]. Left [facility] requesting call back to discuss inquiry noted below for dr adler. Provided our ofc dr line #.

## 2021-07-16 NOTE — TELEPHONE ENCOUNTER
Call from Betty MIN/EMBER w update on pt. Reports he is not taking several of his meds. Noticed bilateral LE swelling. When she discussed this w pt sts he is verbally abusive and sts will not take meds.  Reports several open blisters on both legs-has noticed

## 2021-07-16 NOTE — TELEPHONE ENCOUNTER
Unfortunately, I do not have any further recommendations. It is simply a matter that he needs to start taking his medication properly including his insulin as well as doing activities of daily living including bathing and showering.   If the family cannot

## 2021-07-16 NOTE — TELEPHONE ENCOUNTER
Received call from visiting nurse with medication questions. Reviewed visit with Pam Preston, [address]. Nurse states pt does not have novolog insulin and does not know how to count carbs.     Informed visiting nurse that pt was seen by our diabetes educato

## 2021-07-16 NOTE — TELEPHONE ENCOUNTER
Call back from [address] noted below from dr adler. She requests I send her an email w pt name, GE # . sts CCM dept will get back to us w further info  Above info e-mailed to [facility], as requested.

## 2021-07-16 NOTE — TELEPHONE ENCOUNTER
I was paged by patient son Wero Agarwal. Patient was evaluated by the nurse today. Son says that he was falling a lot, has swollen legs with erythema. Patient has CHF and diabetes.   Was recommended to go to emergency room and patient was taken today by para

## 2021-07-19 ENCOUNTER — TELEPHONE (OUTPATIENT)
Dept: FAMILY MEDICINE CLINIC | Facility: CLINIC | Age: 76
End: 2021-07-19

## 2021-07-19 ENCOUNTER — APPOINTMENT (OUTPATIENT)
Dept: GENERAL RADIOLOGY | Facility: HOSPITAL | Age: 76
End: 2021-07-19
Attending: EMERGENCY MEDICINE
Payer: MEDICARE

## 2021-07-19 ENCOUNTER — HOSPITAL ENCOUNTER (EMERGENCY)
Facility: HOSPITAL | Age: 76
Discharge: HOME | End: 2021-07-19
Attending: EMERGENCY MEDICINE
Payer: MEDICARE

## 2021-07-19 VITALS
RESPIRATION RATE: 16 BRPM | DIASTOLIC BLOOD PRESSURE: 48 MMHG | SYSTOLIC BLOOD PRESSURE: 100 MMHG | TEMPERATURE: 98 F | OXYGEN SATURATION: 95 % | HEART RATE: 82 BPM | WEIGHT: 310 LBS | BODY MASS INDEX: 38.54 KG/M2 | HEIGHT: 75 IN

## 2021-07-19 DIAGNOSIS — I87.2 VENOUS STASIS DERMATITIS OF BOTH LOWER EXTREMITIES: Primary | ICD-10-CM

## 2021-07-19 LAB
ALBUMIN SERPL-MCNC: 3.1 G/DL (ref 3.4–5)
ALBUMIN/GLOB SERPL: 0.8 {RATIO} (ref 1–2)
ALP LIVER SERPL-CCNC: 104 U/L
ALT SERPL-CCNC: 19 U/L
ANION GAP SERPL CALC-SCNC: 8 MMOL/L (ref 0–18)
AST SERPL-CCNC: 20 U/L (ref 15–37)
BASOPHILS # BLD AUTO: 0.06 X10(3) UL (ref 0–0.2)
BASOPHILS NFR BLD AUTO: 0.6 %
BILIRUB SERPL-MCNC: 0.3 MG/DL (ref 0.1–2)
BUN BLD-MCNC: 39 MG/DL (ref 7–18)
BUN/CREAT SERPL: 16.5 (ref 10–20)
CALCIUM BLD-MCNC: 8.9 MG/DL (ref 8.5–10.1)
CHLORIDE SERPL-SCNC: 102 MMOL/L (ref 98–112)
CO2 SERPL-SCNC: 26 MMOL/L (ref 21–32)
CREAT BLD-MCNC: 2.37 MG/DL
DEPRECATED RDW RBC AUTO: 47.6 FL (ref 35.1–46.3)
EOSINOPHIL # BLD AUTO: 0.54 X10(3) UL (ref 0–0.7)
EOSINOPHIL NFR BLD AUTO: 5.2 %
ERYTHROCYTE [DISTWIDTH] IN BLOOD BY AUTOMATED COUNT: 14 % (ref 11–15)
GLOBULIN PLAS-MCNC: 3.9 G/DL (ref 2.8–4.4)
GLUCOSE BLD-MCNC: 139 MG/DL (ref 70–99)
HCT VFR BLD AUTO: 38 %
HGB BLD-MCNC: 12 G/DL
IMM GRANULOCYTES # BLD AUTO: 0.06 X10(3) UL (ref 0–1)
IMM GRANULOCYTES NFR BLD: 0.6 %
LYMPHOCYTES # BLD AUTO: 1.91 X10(3) UL (ref 1–4)
LYMPHOCYTES NFR BLD AUTO: 18.2 %
M PROTEIN MFR SERPL ELPH: 7 G/DL (ref 6.4–8.2)
MCH RBC QN AUTO: 29.3 PG (ref 26–34)
MCHC RBC AUTO-ENTMCNC: 31.6 G/DL (ref 31–37)
MCV RBC AUTO: 92.9 FL
MONOCYTES # BLD AUTO: 1.02 X10(3) UL (ref 0.1–1)
MONOCYTES NFR BLD AUTO: 9.7 %
NEUTROPHILS # BLD AUTO: 6.89 X10 (3) UL (ref 1.5–7.7)
NEUTROPHILS # BLD AUTO: 6.89 X10(3) UL (ref 1.5–7.7)
NEUTROPHILS NFR BLD AUTO: 65.7 %
OSMOLALITY SERPL CALC.SUM OF ELEC: 294 MOSM/KG (ref 275–295)
PLATELET # BLD AUTO: 224 10(3)UL (ref 150–450)
POTASSIUM SERPL-SCNC: 3.9 MMOL/L (ref 3.5–5.1)
RBC # BLD AUTO: 4.09 X10(6)UL
SODIUM SERPL-SCNC: 136 MMOL/L (ref 136–145)
WBC # BLD AUTO: 10.5 X10(3) UL (ref 4–11)

## 2021-07-19 PROCEDURE — 71045 X-RAY EXAM CHEST 1 VIEW: CPT | Performed by: EMERGENCY MEDICINE

## 2021-07-19 PROCEDURE — 85025 COMPLETE CBC W/AUTO DIFF WBC: CPT | Performed by: EMERGENCY MEDICINE

## 2021-07-19 PROCEDURE — 99283 EMERGENCY DEPT VISIT LOW MDM: CPT

## 2021-07-19 PROCEDURE — 36415 COLL VENOUS BLD VENIPUNCTURE: CPT

## 2021-07-19 PROCEDURE — 80053 COMPREHEN METABOLIC PANEL: CPT | Performed by: EMERGENCY MEDICINE

## 2021-07-19 RX ORDER — CEPHALEXIN 500 MG/1
500 CAPSULE ORAL 4 TIMES DAILY
COMMUNITY
Start: 2021-07-16 | End: 2021-07-26

## 2021-07-19 NOTE — ED PROVIDER NOTES
Patient Seen in: [facility] Emergency Department      History   Patient presents with:  Cellulitis    Stated Complaint: wounds to LE's, sent by PMD for evaluation    HPI/Subjective:   HPI    Patient is a 54-year-old male comes in emergency room for disease)    • Cancer [facility])     prostate   • Cerumen impaction 5/26/05    right   • Congestive heart disease ([facility] [address]     not sure when last episode was- many years ago   • COPD (chronic obstructive pulmonary disease) (HCC)     no O2   • Coronary atheroscleros • Visual impairment               Past Surgical History:   Procedure Laterality Date   • AMPUTATION METATARSAL+TOE,SINGLE Left 04/2021    3rd toe   • ANGIOGRAM     • CARDIAC CATHERTERIZATION (PBP)  09/28/2007    left heart cath,liclat selective coronary bilaterally, no wheezing, no rales, no rhonchi. CARDIOVASCULAR: Regular rate and rhythm. Normal S1S2. No S3S4 or murmur. ABDOMEN: Bowel sounds are present. Soft. nondistended, no pulsatile masses.  nontender  MUSCULOSKELETAL: Patient has 2+ edema bilate wounds to 's, sent by PMD for evaluation  PATIENT STATED HISTORY: (As transcribed by Technologist)  Patient offered no additional history at this time. FINDINGS:  Cardiac silhouette is mildly enlarged. Thoracic aorta is mildly tortuous.   Aortic ather but to return immediately to the ER for worsening, concerning, new, changing or persisting symptoms. I discussed the case with the patient and they had no questions, complaints, or concerns. Patient felt comfortable going home.

## 2021-07-19 NOTE — TELEPHONE ENCOUNTER
Based on symptoms described by patient, it was determined that live call be transferred to a triage nurse for further evaluation. 1. What are your symptoms? In a state of delirium, continues to fall, sores opening      2.  How long have you been havin

## 2021-07-19 NOTE — TELEPHONE ENCOUNTER
KIM from pt. s son Ruben Dyer. ( He is  on pt.s HIPPA) He went to [facility] ER over the weekend. He was prescribed Keflex 500 mg QID x 10 days for b/l lower extremity cellulitis. Per pt.s son: pt continues to decline. He is falling, his open sores are worsening.  H

## 2021-07-19 NOTE — ED INITIAL ASSESSMENT (HPI)
Pt here for cellulitis to bilateral feet  Per pt, \"I was at [facility] on Friday for my feet. They thought I had a cellulitis. They sent me home on an antibiotic.  This morning my son didn't like that answer and called Dr. Saritha Vanessa and they recommended coming to the

## 2021-07-19 NOTE — CM/SW NOTE
Patient sent to the ED for assistance with long term placement (son at bedside confirmed). Patient is difficult to work with in the room. Patient is not decisional, per son, and patient's spouse stays in bed all day.   The fire department has had to come

## 2021-07-20 ENCOUNTER — TELEPHONE (OUTPATIENT)
Dept: FAMILY MEDICINE CLINIC | Facility: CLINIC | Age: 76
End: 2021-07-20

## 2021-07-20 NOTE — TELEPHONE ENCOUNTER
Leonel Favre, RN on 7/16 ambulance was called to take pt to ER for increase B/L leg edema and redness. She states pt has not taken his medications possibly about 1 week. Pt was released. Then yesterday, pt was back to the ER.

## 2021-07-24 ENCOUNTER — OFFICE VISIT (OUTPATIENT)
Dept: FAMILY MEDICINE CLINIC | Facility: CLINIC | Age: 76
End: 2021-07-24
Payer: MEDICARE

## 2021-07-24 VITALS
HEART RATE: 80 BPM | TEMPERATURE: 98 F | SYSTOLIC BLOOD PRESSURE: 128 MMHG | RESPIRATION RATE: 18 BRPM | HEIGHT: 72 IN | BODY MASS INDEX: 42 KG/M2 | DIASTOLIC BLOOD PRESSURE: 54 MMHG

## 2021-07-24 DIAGNOSIS — J22 ACUTE LOWER RESPIRATORY INFECTION: ICD-10-CM

## 2021-07-24 DIAGNOSIS — I13.0 CARDIORENAL SYNDROME WITH RENAL FAILURE, STAGE 1-4 OR UNSPECIFIED CHRONIC KIDNEY DISEASE, WITH HEART FAILURE (HCC): ICD-10-CM

## 2021-07-24 DIAGNOSIS — I87.2 VENOUS STASIS DERMATITIS OF BOTH LOWER EXTREMITIES: ICD-10-CM

## 2021-07-24 DIAGNOSIS — N18.4 STAGE 4 CHRONIC KIDNEY DISEASE (HCC): ICD-10-CM

## 2021-07-24 DIAGNOSIS — E11.22 TYPE 2 DIABETES MELLITUS WITH DIABETIC CHRONIC KIDNEY DISEASE, UNSPECIFIED CKD STAGE, UNSPECIFIED WHETHER LONG TERM INSULIN USE (HCC): Primary | ICD-10-CM

## 2021-07-24 PROCEDURE — 99215 OFFICE O/P EST HI 40 MIN: CPT | Performed by: FAMILY MEDICINE

## 2021-07-24 RX ORDER — AZITHROMYCIN 250 MG/1
TABLET, FILM COATED ORAL
Qty: 6 TABLET | Refills: 0 | Status: SHIPPED | OUTPATIENT
Start: 2021-07-24 | End: 2021-07-29

## 2021-07-26 NOTE — PROGRESS NOTES
[facility] Family Medicine Office Note  Chief Complaint:   Patient presents with:  Er F/u  Cough      HPI:   This is a 67year old male coming in for DM2, cardiorenal syndrome, CKD, cough and venous stasis dermatitis.   Patient is with wife and da many years ago   • COPD (chronic obstructive pulmonary disease) (HCC)     no O2   • Coronary atherosclerosis of unspecified type of vessel, native or graft 3/26/2012   • Coronary disease     s/p LAD stent   • Depression    • Disorder of thyroid    • Dysuri • CARDIAC CATHERTERIZATION (PBP)  09/28/2007    left heart cath,liclat selective coronary angiogram,left iliofemoral angiogram   • CATH BARE METAL STENT (BMS)     • HEMORRHOIDECTOMY  9/6/07   • OTHER      AAA repair   • OTHER SURGICAL HISTORY  9/07 Monitoring Suppl (TRUERESULT BLOOD GLUCOSE) w/Device Does not apply Kit Use as directed. Check blood sugar 4X daily and as needed. Dx E11.9, E11.65 1 kit 0   • Lancets Micro Thin 33G Does not apply Misc 1 Box by Finger stick route As Directed.  Check blood s Reported on 7/24/2021) 270 tablet 0   • Insulin Aspart Pen 100 UNIT/ML Subcutaneous Solution Pen-injector Inject 1 unit of Novolog insulin for every 10 grams of carbohydrates eaten into the skin 3 (three) times daily with meals.   Do not give if patient is abnormalities noted.   LUNGS: clear to auscultation bilaterally, no rales/rhonchi/wheezing  HEART:  Regular rate and rhythm, no murmurs, rubs or gallops  ABDOMEN:  Soft, nondistended, nontender, bowel sounds normal in all 4 quadrants, no hepatosplenomegaly Visit:  Requested Prescriptions     Signed Prescriptions Disp Refills   • insulin detemir 100 UNIT/ML Subcutaneous Solution Pen-injector 15 each 0     Sig: Inject 50 Units into the skin daily.    • Insulin Aspart Pen 100 UNIT/ML Subcutaneous Solution Pen-in unspecified COPD type (HCC)     Chronic bronchitis, unspecified chronic bronchitis type ([facility] [address]     COPD exacerbation (HCC)     Benign essential HTN     Diabetes mellitus type 2 in obese [facility])     CAD in native artery     Mixed hyperlipidemia     At risk for

## 2021-07-27 RX ORDER — GABAPENTIN 300 MG/1
CAPSULE ORAL
Qty: 270 CAPSULE | Refills: 0 | Status: SHIPPED | OUTPATIENT
Start: 2021-07-27 | End: 2021-10-25

## 2021-07-27 NOTE — TELEPHONE ENCOUNTER
Pt states wife thinks he needs to be \"in some kind of institution where they can watch me 24 hrs a day\" and that wife states Dr Mario Bailey recommends this. He would like to know if that is true. Please advise. Thank you.

## 2021-07-27 NOTE — TELEPHONE ENCOUNTER
I am not even sure how to answer this statement. I explained to his wife that he will likely need more constant medical care given his chronic medical conditions but the statement is taking it to an extreme. Again, I am his medical physician and not a counselor.

## 2021-07-27 NOTE — TELEPHONE ENCOUNTER
Call to pt reaches nick-not on hipaa-sts mika/home health nurse is there with them now, discussing w pt that all his doctors-dr adler, dr Prince Dunne, etc- feel he is at a point that he needs more care than can be provided at home. Advised I am returning pt's call re same info. Pt comes to phone. Advised of Dr adler's comments from 7/9/21 and explained our concerns for his medical well being and safety:  From 7/9/21:  \"It is simply a matter that he needs to start taking his medication properly including his insulin as well as doing activities of daily living including bathing and showering. If the family cannot assist with these activities and patient is not wanting to take his medication, he will need to be placed into an assisted living or nursing home\". Pt sts \"that may be dr Dougherty Lines opinion, but that's not mine. Offers no other comments and voices no further questions. Advised will update dr adler w this info. Pt disconnects call. **update to dr adler as fyi only.

## 2021-08-05 ENCOUNTER — TELEPHONE (OUTPATIENT)
Dept: FAMILY MEDICINE CLINIC | Facility: CLINIC | Age: 76
End: 2021-08-05

## 2021-08-05 DIAGNOSIS — R05.9 COUGH: Primary | ICD-10-CM

## 2021-08-05 NOTE — TELEPHONE ENCOUNTER
S/w sonia. Advised her of below. She voiced understanding. Orders placed. # given to set up. She reports RN is going there in nehemiah. Asks if she feels he is worse can he go to urgent care, I advised yes.

## 2021-08-05 NOTE — TELEPHONE ENCOUNTER
Please obtain CXR today and will give further instructions afterward. Cough could be 2/2 CHF. If not tested for covid recently, please order covid PCR as well.

## 2021-08-05 NOTE — TELEPHONE ENCOUNTER
S/w Neelon Tina daughter n law. Reports Nabor Bagley has been refusing insulin Sunday. sts they were finally able to convince him to take it last night after telling him if he didn't he would have to go to ER. Reports stll having cough. Reports it is wet cough.

## 2021-08-05 NOTE — TELEPHONE ENCOUNTER
Pt daughter called back regarding a voicemail the family had left after hours last night for Dr Gabrielle Drake.     Dr SHAH states he had attempted to call back and left them a voicemail, however pt daughter states her mother does not have her phone so is unaware of the

## 2021-08-09 ENCOUNTER — TELEPHONE (OUTPATIENT)
Dept: FAMILY MEDICINE CLINIC | Facility: CLINIC | Age: 76
End: 2021-08-09

## 2021-08-09 NOTE — TELEPHONE ENCOUNTER
Pt son calling to speak with nurse regarding pt current states of health. Pt is refusing any treatment and family is concerned. Pt refuses blood work, insulin treatment, etc.     Pt also has a cough that family is concerned may become pneumonia.     Pt f

## 2021-08-09 NOTE — TELEPHONE ENCOUNTER
S/w willow. Reports pt fefuses to do anything  He is developing cough  Swollen legs, sweat. Asking is there a more humane option. Palliative care? \"Gets riled up when nurse come\"  sts Fontaine Simmonds is out of mind\".   Came up stairs mid of night and asked for co

## 2021-08-11 ENCOUNTER — TELEPHONE (OUTPATIENT)
Dept: FAMILY MEDICINE CLINIC | Facility: CLINIC | Age: 76
End: 2021-08-11

## 2021-08-11 NOTE — TELEPHONE ENCOUNTER
Love  from [facility] called after recent evaluation on pt at family's request. (see Te from 8/9)    Indicates this is a difficult case. Feels pt's memory assessment should be done to deem competency.   Reports if he is deemed competent but just no

## 2021-08-11 NOTE — TELEPHONE ENCOUNTER
Would recommend neurology with Dr. Marla Gallegos but he will likely not do it. I don't believe he has functional dementia and if he does, it is very mild. I believe he is just refusing treatment as he is frustrated and tired of dealing with medical issues.

## 2021-08-11 NOTE — TELEPHONE ENCOUNTER
Call back from rito//EMBER home health-advised of dr adler's comments/recommendations noted below. Provided location/contact info for dr Juana Jorgensen.  Advised pt was referred to this same specialist 3/29/21  Rito voices understanding, agrees

## 2021-08-13 ENCOUNTER — MED REC SCAN ONLY (OUTPATIENT)
Dept: FAMILY MEDICINE CLINIC | Facility: CLINIC | Age: 76
End: 2021-08-13

## 2021-08-19 ENCOUNTER — OFFICE VISIT (OUTPATIENT)
Dept: NEPHROLOGY | Facility: CLINIC | Age: 76
End: 2021-08-19
Payer: MEDICARE

## 2021-08-19 VITALS — DIASTOLIC BLOOD PRESSURE: 62 MMHG | SYSTOLIC BLOOD PRESSURE: 122 MMHG

## 2021-08-19 DIAGNOSIS — I50.32 CHRONIC DIASTOLIC HEART FAILURE (HCC): ICD-10-CM

## 2021-08-19 DIAGNOSIS — E11.21 DIABETIC NEPHROPATHY ASSOCIATED WITH TYPE 2 DIABETES MELLITUS (HCC): ICD-10-CM

## 2021-08-19 DIAGNOSIS — N18.4 CKD (CHRONIC KIDNEY DISEASE) STAGE 4, GFR 15-29 ML/MIN (HCC): Primary | ICD-10-CM

## 2021-08-19 PROCEDURE — 99215 OFFICE O/P EST HI 40 MIN: CPT | Performed by: INTERNAL MEDICINE

## 2021-08-19 NOTE — PROGRESS NOTES
Nephrology Progress Note      ASSESSMENT/PLAN:      1) CKD 4- baseline Cr approaching 2.5 mg/dl and relatively stable since his last visit in 2018 and represents diabetic nephropathy consistent with his longstanding history of uncontrolled type 2 diabetes aneurysm [facility]) 2007    s/p stent graft   • Acute sinusitis 1/4/12   • Aneurysm ([address]     large,infrarenal   • Anxiety state    • Atelectasis 11/6/07    on chest x-ray minimal right perihilar    • Atherosclerosis of coronary artery    • Atherosclerotic hear 11/6/07    to right presumed secondary to an enlarged thyroid   • Type II or unspecified type diabetes mellitus without mention of complication, not stated as uncontrolled 10/26/2010   • Unspecified sleep apnea DMG SPLIT 12-13-12    AHI 46 RDI 65 SaO2 87% glucose is greater than 140 mg/dL into the skin three (3) times daily with meals.   This is correction insulin dose 10 each 0   • Insulin Pen Needle (NOVOFINE) 32G X 6 MM Does not apply Misc Use with levemir and novolog up to 4 times daily 360 each 0   • bu Aero Soln Inhale 2 puffs into the lungs every 6 (six) hours as needed for Wheezing. • Budesonide-Formoterol Fumarate 160-4.5 MCG/ACT Inhalation Aerosol Inhale 2 puffs into the lungs 2 (two) times daily.  (Patient not taking: Reported on 7/24/2021 )

## 2021-08-20 DIAGNOSIS — E11.22 TYPE 2 DIABETES MELLITUS WITH DIABETIC CHRONIC KIDNEY DISEASE, UNSPECIFIED CKD STAGE, UNSPECIFIED WHETHER LONG TERM INSULIN USE (HCC): ICD-10-CM

## 2021-08-20 RX ORDER — INSULIN DETEMIR 100 [IU]/ML
INJECTION, SOLUTION SUBCUTANEOUS
Qty: 15 EACH | Refills: 0 | Status: SHIPPED | OUTPATIENT
Start: 2021-08-20 | End: 2021-10-29

## 2021-08-30 ENCOUNTER — TELEPHONE (OUTPATIENT)
Dept: FAMILY MEDICINE CLINIC | Facility: CLINIC | Age: 76
End: 2021-08-30

## 2021-08-30 DIAGNOSIS — F41.1 GENERALIZED ANXIETY DISORDER: ICD-10-CM

## 2021-08-30 DIAGNOSIS — C61 MALIGNANT NEOPLASM OF PROSTATE (HCC): ICD-10-CM

## 2021-08-30 RX ORDER — CLONAZEPAM 2 MG/1
2 TABLET ORAL 3 TIMES DAILY
Qty: 90 TABLET | Refills: 2 | Status: SHIPPED | OUTPATIENT
Start: 2021-08-30 | End: 2021-09-08 | Stop reason: DRUGHIGH

## 2021-08-30 RX ORDER — TAMSULOSIN HYDROCHLORIDE 0.4 MG/1
0.8 CAPSULE ORAL DAILY
Qty: 180 CAPSULE | Refills: 0 | Status: SHIPPED | OUTPATIENT
Start: 2021-08-30

## 2021-08-30 RX ORDER — TAMSULOSIN HYDROCHLORIDE 0.4 MG/1
0.8 CAPSULE ORAL DAILY
Qty: 30 CAPSULE | Refills: 0 | Status: CANCELLED | OUTPATIENT
Start: 2021-08-30

## 2021-08-30 NOTE — TELEPHONE ENCOUNTER
Caller requesting refill of:    tamsulosin [facility]) cap    Be sent to:    [facility] [address], [address], [phone], [phone]      Pt will be out of meds by tomorrow Karlene Cleaves

## 2021-08-30 NOTE — TELEPHONE ENCOUNTER
I'm not sure why it was for quantitiy 60 instead of 90. Ok to refill if appropriate for klonopin 2mg TID PRN #90 and 2 refills.

## 2021-08-30 NOTE — TELEPHONE ENCOUNTER
Pt needs a local prescription for flomax until the mail order prescription comes. Please authorize .

## 2021-08-30 NOTE — TELEPHONE ENCOUNTER
Pt daughter in law on verbal has questions about the quantity of clonazePAM 2 MG Oral Tab call in last time if he is taking 3 a day it should have been 90 quantity. Please advise, thank you.

## 2021-08-30 NOTE — TELEPHONE ENCOUNTER
Please see message below. Did you decrease the dispensed quantity of the clonazepam from 90 to 60 for a reason. See message below.

## 2021-09-01 RX ORDER — TAMSULOSIN HYDROCHLORIDE 0.4 MG/1
0.8 CAPSULE ORAL DAILY
Qty: 180 CAPSULE | Refills: 0 | Status: SHIPPED | OUTPATIENT
Start: 2021-09-01 | End: 2021-09-02

## 2021-09-01 NOTE — TELEPHONE ENCOUNTER
Flomax sent to mail order pharmacy: 08/30/21, #180. Ref 0    Call to pt to advise he will likely have to pay out of pocket for additional medication.   LVM advising pt to call back today to discuss refill request.    **See pended order for 7 day supply to b

## 2021-09-02 DIAGNOSIS — E11.22 TYPE 2 DIABETES MELLITUS WITH DIABETIC CHRONIC KIDNEY DISEASE, UNSPECIFIED CKD STAGE, UNSPECIFIED WHETHER LONG TERM INSULIN USE (HCC): ICD-10-CM

## 2021-09-02 RX ORDER — GLIPIZIDE 10 MG/1
TABLET ORAL
Qty: 90 TABLET | Refills: 0 | Status: SHIPPED | OUTPATIENT
Start: 2021-09-02 | End: 2021-10-29 | Stop reason: CLARIF

## 2021-09-02 NOTE — TELEPHONE ENCOUNTER
Noted. Call to [facility] in [place]. S/w Bindu. Requested to cancel rx for Flomax, sent 09/01/21. [facility] confirmed cancellation.

## 2021-09-03 ENCOUNTER — MED REC SCAN ONLY (OUTPATIENT)
Dept: FAMILY MEDICINE CLINIC | Facility: CLINIC | Age: 76
End: 2021-09-03

## 2021-09-07 ENCOUNTER — TELEPHONE (OUTPATIENT)
Dept: FAMILY MEDICINE CLINIC | Facility: CLINIC | Age: 76
End: 2021-09-07

## 2021-09-07 NOTE — TELEPHONE ENCOUNTER
Pt calling regarding clonazePAM 2 MG Oral Tab that believes that it has stopped working. Pt states he has been waking up 2-3 times in the middle of the night as a result to this medication not working anymore.     Pt requesting Dr Saritha Vanessa switch his medicat

## 2021-09-08 NOTE — TELEPHONE ENCOUNTER
Call to pt-sts he just had virtual visit w dr adler addressing this info. Voices thanks for the follow up call.

## 2021-09-09 NOTE — PROGRESS NOTES
Subjective     HPI:   Milo Oropeza verbally consents to a Virtual/Telephone Check-In service on 09/09/21. Patient understands and accepts financial responsibility for any deductible, co-insurance and/or co-pays associated with this service.  This visit lunesta and with gabapentin to prevent respiratory depression and possible death  -  Patient understood and agreed    Generalized anxiety disorder  -  Persistent  -  Stop evening dose of klonopin due to no improvement with insomnia  -  Otherwise, cpm

## 2021-09-13 ENCOUNTER — TELEPHONE (OUTPATIENT)
Dept: FAMILY MEDICINE CLINIC | Facility: CLINIC | Age: 76
End: 2021-09-13

## 2021-09-13 DIAGNOSIS — R41.3 MEMORY CHANGES: Primary | ICD-10-CM

## 2021-09-13 NOTE — TELEPHONE ENCOUNTER
Pt sone calling regarding pt's medications and overall health. Pt son states that pt has stopped taking all medication except clonazePAM (KLONOPIN) 2 MG Oral Tab.      Son states that the sores are opening up on legs again and that pt's cognitive ability

## 2021-09-13 NOTE — TELEPHONE ENCOUNTER
I spoke with Smooth Quinteros. I informed him you recommend an appointment with neuro to further evaluate the dementia symptoms. He would like a recommendation sent to his my chart. He appreciates your help and realizes there is not much else anyone can do.  You do not

## 2021-09-13 NOTE — TELEPHONE ENCOUNTER
I am happy to reach out to him but not sure what I can do at this point. He should be seen by neurologist to r/o dementia and we need to have someone in the home administer his meds.   If they are unable to do that, they should consider either full time nu

## 2021-09-14 ENCOUNTER — PATIENT MESSAGE (OUTPATIENT)
Dept: FAMILY MEDICINE CLINIC | Facility: CLINIC | Age: 76
End: 2021-09-14

## 2021-09-14 NOTE — TELEPHONE ENCOUNTER
Noted.   Silent Herdsman message with neurologist info sent to pt's mychart as nick/son-on hipaa-requested below.

## 2021-09-15 ENCOUNTER — LAB ENCOUNTER (OUTPATIENT)
Dept: LAB | Age: 76
End: 2021-09-15
Attending: FAMILY MEDICINE
Payer: MEDICARE

## 2021-09-15 ENCOUNTER — TELEPHONE (OUTPATIENT)
Dept: FAMILY MEDICINE CLINIC | Facility: CLINIC | Age: 76
End: 2021-09-15

## 2021-09-15 DIAGNOSIS — N39.43 DRIBBLING OF URINE: Primary | ICD-10-CM

## 2021-09-15 DIAGNOSIS — N39.43 DRIBBLING OF URINE: ICD-10-CM

## 2021-09-15 LAB
BILIRUB UR QL STRIP.AUTO: NEGATIVE
GLUCOSE UR STRIP.AUTO-MCNC: NEGATIVE MG/DL
KETONES UR STRIP.AUTO-MCNC: NEGATIVE MG/DL
LEUKOCYTE ESTERASE UR QL STRIP.AUTO: NEGATIVE
NITRITE UR QL STRIP.AUTO: NEGATIVE
PH UR STRIP.AUTO: 6 [PH] (ref 5–8)
PROT UR STRIP.AUTO-MCNC: NEGATIVE MG/DL
RBC UR QL AUTO: NEGATIVE
SP GR UR STRIP.AUTO: 1.01 (ref 1–1.03)
UROBILINOGEN UR STRIP.AUTO-MCNC: <2 MG/DL

## 2021-09-15 PROCEDURE — 81003 URINALYSIS AUTO W/O SCOPE: CPT

## 2021-09-15 NOTE — TELEPHONE ENCOUNTER
From: Nora Chowdhury  To: Kristen POZO DO  Sent: 9/14/2021 5:14 PM CDT  Subject: UTI?     Keaton Vernon the home care nurse was here today and Dad/Frankie said that the diarrhea has gone away (he had for a week) but now he is having trouble urinati

## 2021-09-17 DIAGNOSIS — F51.01 PRIMARY INSOMNIA: ICD-10-CM

## 2021-09-17 RX ORDER — ESZOPICLONE 1 MG/1
1 TABLET, FILM COATED ORAL NIGHTLY
Qty: 30 TABLET | Refills: 0 | Status: SHIPPED | OUTPATIENT
Start: 2021-09-17 | End: 2021-10-29

## 2021-09-17 NOTE — TELEPHONE ENCOUNTER
PT WOULD LIKE eszopiclone (LUNESTA) 1 MG Oral Tab SENT TO [facility] [id number] - [address], [phone] IT IS CHEAPER THANK YOU

## 2021-09-19 DIAGNOSIS — K21.9 GASTROESOPHAGEAL REFLUX DISEASE, UNSPECIFIED WHETHER ESOPHAGITIS PRESENT: ICD-10-CM

## 2021-09-21 RX ORDER — OMEPRAZOLE 40 MG/1
CAPSULE, DELAYED RELEASE ORAL
Qty: 90 CAPSULE | Refills: 0 | Status: SHIPPED | OUTPATIENT
Start: 2021-09-21 | End: 2021-10-29

## 2021-09-23 ENCOUNTER — TELEPHONE (OUTPATIENT)
Dept: FAMILY MEDICINE CLINIC | Facility: CLINIC | Age: 76
End: 2021-09-23

## 2021-09-23 NOTE — TELEPHONE ENCOUNTER
Pt took sleeping pill last night at 9:30 for first time and did not sleep at all (did not know name of med). He is asking if he can go back to his \"old system\".     He has a problem with his phone, so when triage calls and he does not  he will s

## 2021-09-23 NOTE — TELEPHONE ENCOUNTER
S/w pt. Sts his daughter in law in handling all of his medication. He he took [place] for the first time last night, did not help with sleep. Sts he is refusing to take medication again.      Sts he was previously taking clonazePAM (KLONOPIN) 2 MG Oral Ta

## 2021-10-06 DIAGNOSIS — E03.9 HYPOTHYROIDISM, UNSPECIFIED: ICD-10-CM

## 2021-10-06 RX ORDER — LEVOTHYROXINE SODIUM 0.2 MG/1
TABLET ORAL
Qty: 30 TABLET | Refills: 0 | Status: SHIPPED | OUTPATIENT
Start: 2021-10-06 | End: 2021-10-29

## 2021-10-06 NOTE — TELEPHONE ENCOUNTER
LOV: 3/12/21  for: acquired hypothyroidism  Patient advised to RTC on: 3 months   Previous Rx: Levothyroxine 100mcg à Si tablet by mouth before breakfast. Disp #90 / 0 refills.   LF: 21

## 2021-10-13 ENCOUNTER — TELEPHONE (OUTPATIENT)
Dept: FAMILY MEDICINE CLINIC | Facility: CLINIC | Age: 76
End: 2021-10-13

## 2021-10-13 RX ORDER — CLONAZEPAM 2 MG/1
2 TABLET ORAL 3 TIMES DAILY PRN
Qty: 90 TABLET | Refills: 1 | Status: SHIPPED | OUTPATIENT
Start: 2021-10-13 | End: 2021-10-29

## 2021-10-13 NOTE — TELEPHONE ENCOUNTER
Call from marcin/pharmacist/[facility]-requesting clarification of pt's clonazepam dosing and most recent dispense quantity #60-as sig is tid prn. Pt needs refill.    Record review shows historical clonazepam 2 mg sig change 9/8/21 to take  One tab AM and one tab

## 2021-10-21 ENCOUNTER — MED REC SCAN ONLY (OUTPATIENT)
Dept: FAMILY MEDICINE CLINIC | Facility: CLINIC | Age: 76
End: 2021-10-21

## 2021-10-25 RX ORDER — GABAPENTIN 300 MG/1
CAPSULE ORAL
Qty: 270 CAPSULE | Refills: 0 | Status: SHIPPED | OUTPATIENT
Start: 2021-10-25 | End: 2021-10-29

## 2021-10-25 NOTE — TELEPHONE ENCOUNTER
GABAPENTIN 300 MG CAPSULE    LOV: 09/08/2021 for Insomnia    UPCOMING APPT: n/a    LAST REFILL: 07/27/2021  QTY:  270 / 0 REFILLS      RX pended, please review if applicable. Thank You!

## 2021-10-29 ENCOUNTER — HOSPITAL ENCOUNTER (INPATIENT)
Facility: HOSPITAL | Age: 76
LOS: 2 days | Discharge: HOME | DRG: 602 | End: 2021-10-31
Attending: EMERGENCY MEDICINE | Admitting: HOSPITALIST
Payer: MEDICARE

## 2021-10-29 ENCOUNTER — TELEPHONE (OUTPATIENT)
Dept: FAMILY MEDICINE CLINIC | Facility: CLINIC | Age: 76
End: 2021-10-29

## 2021-10-29 DIAGNOSIS — L03.116 CELLULITIS OF LEFT LOWER EXTREMITY: Primary | ICD-10-CM

## 2021-10-29 DIAGNOSIS — I73.9 PAD (PERIPHERAL ARTERY DISEASE) (HCC): ICD-10-CM

## 2021-10-29 DIAGNOSIS — R73.9 HYPERGLYCEMIA: ICD-10-CM

## 2021-10-29 PROCEDURE — 99223 1ST HOSP IP/OBS HIGH 75: CPT | Performed by: HOSPITALIST

## 2021-10-29 RX ORDER — POLYETHYLENE GLYCOL 3350 17 G/17G
17 POWDER, FOR SOLUTION ORAL DAILY PRN
Status: DISCONTINUED | OUTPATIENT
Start: 2021-10-29 | End: 2021-10-31

## 2021-10-29 RX ORDER — CLONAZEPAM 2 MG/1
4 TABLET ORAL
COMMUNITY

## 2021-10-29 RX ORDER — DEXTROSE MONOHYDRATE 25 G/50ML
50 INJECTION, SOLUTION INTRAVENOUS
Status: DISCONTINUED | OUTPATIENT
Start: 2021-10-29 | End: 2021-10-31

## 2021-10-29 RX ORDER — TAMSULOSIN HYDROCHLORIDE 0.4 MG/1
0.8 CAPSULE ORAL DAILY
Status: DISCONTINUED | OUTPATIENT
Start: 2021-10-30 | End: 2021-10-31

## 2021-10-29 RX ORDER — BISACODYL 10 MG
10 SUPPOSITORY, RECTAL RECTAL
Status: DISCONTINUED | OUTPATIENT
Start: 2021-10-29 | End: 2021-10-31

## 2021-10-29 RX ORDER — HEPARIN SODIUM 5000 [USP'U]/ML
5000 INJECTION, SOLUTION INTRAVENOUS; SUBCUTANEOUS EVERY 12 HOURS SCHEDULED
Status: DISCONTINUED | OUTPATIENT
Start: 2021-10-29 | End: 2021-10-31

## 2021-10-29 RX ORDER — GABAPENTIN 300 MG/1
300 CAPSULE ORAL 3 TIMES DAILY
COMMUNITY

## 2021-10-29 RX ORDER — BUMETANIDE 1 MG/1
2 TABLET ORAL EVERY EVENING
Status: DISCONTINUED | OUTPATIENT
Start: 2021-10-29 | End: 2021-10-29

## 2021-10-29 RX ORDER — CLONAZEPAM 1 MG/1
4 TABLET ORAL EVERY MORNING
Status: DISCONTINUED | OUTPATIENT
Start: 2021-10-30 | End: 2021-10-31

## 2021-10-29 RX ORDER — GABAPENTIN 300 MG/1
300 CAPSULE ORAL 3 TIMES DAILY
Status: DISCONTINUED | OUTPATIENT
Start: 2021-10-29 | End: 2021-10-31

## 2021-10-29 RX ORDER — CLONAZEPAM 2 MG/1
4 TABLET ORAL EVERY EVENING
COMMUNITY

## 2021-10-29 RX ORDER — LOSARTAN POTASSIUM 100 MG/1
100 TABLET ORAL DAILY
Status: DISCONTINUED | OUTPATIENT
Start: 2021-10-30 | End: 2021-10-31

## 2021-10-29 RX ORDER — PANTOPRAZOLE SODIUM 40 MG/1
40 TABLET, DELAYED RELEASE ORAL
Status: DISCONTINUED | OUTPATIENT
Start: 2021-10-30 | End: 2021-10-31

## 2021-10-29 RX ORDER — FUROSEMIDE 10 MG/ML
60 INJECTION INTRAMUSCULAR; INTRAVENOUS
Status: DISCONTINUED | OUTPATIENT
Start: 2021-10-30 | End: 2021-10-31

## 2021-10-29 RX ORDER — BUMETANIDE 2 MG/1
4 TABLET ORAL EVERY MORNING
COMMUNITY

## 2021-10-29 RX ORDER — MELATONIN
3 NIGHTLY PRN
Status: DISCONTINUED | OUTPATIENT
Start: 2021-10-29 | End: 2021-10-31

## 2021-10-29 RX ORDER — LEVOTHYROXINE SODIUM 0.2 MG/1
200 TABLET ORAL
COMMUNITY
End: 2021-11-23

## 2021-10-29 RX ORDER — GLIPIZIDE 10 MG/1
10 TABLET ORAL DAILY
COMMUNITY

## 2021-10-29 RX ORDER — ACETAMINOPHEN 325 MG/1
650 TABLET ORAL EVERY 6 HOURS PRN
Status: DISCONTINUED | OUTPATIENT
Start: 2021-10-29 | End: 2021-10-31

## 2021-10-29 RX ORDER — OMEPRAZOLE 40 MG/1
40 CAPSULE, DELAYED RELEASE ORAL DAILY
COMMUNITY
End: 2021-12-16

## 2021-10-29 RX ORDER — ASPIRIN 81 MG/1
81 TABLET ORAL DAILY
Status: DISCONTINUED | OUTPATIENT
Start: 2021-10-29 | End: 2021-10-31

## 2021-10-29 RX ORDER — FINASTERIDE 5 MG/1
5 TABLET, FILM COATED ORAL DAILY
Status: DISCONTINUED | OUTPATIENT
Start: 2021-10-30 | End: 2021-10-31

## 2021-10-29 RX ORDER — FUROSEMIDE 10 MG/ML
40 INJECTION INTRAMUSCULAR; INTRAVENOUS DAILY
Status: DISCONTINUED | OUTPATIENT
Start: 2021-10-29 | End: 2021-10-29

## 2021-10-29 RX ORDER — LEVOTHYROXINE SODIUM 0.2 MG/1
200 TABLET ORAL
Status: DISCONTINUED | OUTPATIENT
Start: 2021-10-30 | End: 2021-10-31

## 2021-10-29 RX ORDER — LEVOTHYROXINE SODIUM 0.1 MG/1
100 TABLET ORAL
Status: DISCONTINUED | OUTPATIENT
Start: 2021-10-30 | End: 2021-10-31

## 2021-10-29 RX ORDER — POTASSIUM CHLORIDE 20 MEQ/1
20 TABLET, EXTENDED RELEASE ORAL DAILY
COMMUNITY

## 2021-10-29 RX ORDER — METOPROLOL SUCCINATE 50 MG/1
50 TABLET, EXTENDED RELEASE ORAL
Status: DISCONTINUED | OUTPATIENT
Start: 2021-10-30 | End: 2021-10-31

## 2021-10-29 RX ORDER — ALBUTEROL SULFATE 90 UG/1
2 AEROSOL, METERED RESPIRATORY (INHALATION) EVERY 6 HOURS PRN
Status: DISCONTINUED | OUTPATIENT
Start: 2021-10-29 | End: 2021-10-31

## 2021-10-29 RX ORDER — CLONAZEPAM 1 MG/1
4 TABLET ORAL NIGHTLY
Status: DISCONTINUED | OUTPATIENT
Start: 2021-10-29 | End: 2021-10-31

## 2021-10-29 RX ORDER — BUMETANIDE 1 MG/1
4 TABLET ORAL EVERY MORNING
Status: DISCONTINUED | OUTPATIENT
Start: 2021-10-30 | End: 2021-10-29

## 2021-10-29 RX ORDER — LEVOTHYROXINE SODIUM 0.1 MG/1
100 TABLET ORAL
COMMUNITY

## 2021-10-29 RX ORDER — ONDANSETRON 2 MG/ML
8 INJECTION INTRAMUSCULAR; INTRAVENOUS EVERY 6 HOURS PRN
Status: DISCONTINUED | OUTPATIENT
Start: 2021-10-29 | End: 2021-10-31

## 2021-10-29 RX ORDER — CLONAZEPAM 1 MG/1
4 TABLET ORAL
Status: DISCONTINUED | OUTPATIENT
Start: 2021-10-30 | End: 2021-10-31

## 2021-10-29 RX ORDER — BUMETANIDE 2 MG/1
2 TABLET ORAL EVERY EVENING
COMMUNITY

## 2021-10-29 RX ORDER — CLONAZEPAM 2 MG/1
4 TABLET ORAL EVERY MORNING
COMMUNITY

## 2021-10-29 RX ORDER — SPIRONOLACTONE 25 MG/1
50 TABLET ORAL DAILY
Status: DISCONTINUED | OUTPATIENT
Start: 2021-10-30 | End: 2021-10-31

## 2021-10-29 RX ORDER — ATORVASTATIN CALCIUM 20 MG/1
20 TABLET, FILM COATED ORAL NIGHTLY
Refills: 3 | Status: DISCONTINUED | OUTPATIENT
Start: 2021-10-29 | End: 2021-10-31

## 2021-10-29 NOTE — ED PROVIDER NOTES
Patient Seen in: [facility] Emergency Department      History   Patient presents with:  Cellulitis    Stated Complaint:     Subjective:   HPI    65-year-old male presents emergency department who was sent here by his primary care doctor because he i • High cholesterol    • History of noncompliance with medical treatment 9/07   • Hypercholesterolemia    • Hyperlipidemia    • Hypokalemia 9/2007   • Leukocytosis 11/09/2007   • Malignant neoplasm of prostate ([facility] [address] 3/26/2012    pt does not want radiatio Vaping Use      Vaping Use: Never used    Alcohol use: No      Alcohol/week: 0.0 standard drinks    Drug use: No             Review of Systems    Positive for stated complaint:   Other systems are as noted in HPI. Constitutional and vital signs reviewed. tibia and lateral.  Does have some borders with significant erythema around the wounds. Neuro: Cranial nerves II through XII intact with no gross focal sensory or motor abnormality.       ED Course     Labs Reviewed   COMP METABOLIC PANEL (14) - Abnormal; started on the IV antibiotics. Did not have any significantly elevated white count glucose was 239. Creatinine 2.39.   Patient will be admitted for further wound management and diabetic control    MDM      Patient was evaluated in the emergency department

## 2021-10-29 NOTE — ED QUICK NOTES
When pt was called to triage pt asked for a turkey sandwich. Pt was informed that he needs to be assessed first prior to being provided something to eat. Pt  Stated this was ridiculous and that he wants this to be over with and go home.  Rn further asked pt

## 2021-10-29 NOTE — ED INITIAL ASSESSMENT (HPI)
Pt reports he has diabetes and was sent here by his doctor. Pt has sores in his LLE and one of the sores was oozing and bleeding last night. Pt denies pain. Pt states he is here to get his wounds looked at.  Redness and swelling noted to LLE, wounds are stephenie

## 2021-10-29 NOTE — ED QUICK NOTES
Orders for admission, patient is aware of plan and ready to go upstairs. Any questions, please call ED RN Bill Mckoy at [phone]    Vaccinated?  Yes  Type of COVID test sent: Rapid  COVID Suspicion level: Neg      Titratable drug(s) infusing: None  Rate: None    LOC

## 2021-10-29 NOTE — H&P
[facility] HOSPITALIST  History and Physical     Geo Antonio Patient Status:  Emergency    1945 MRN [id number]   Location [facility] Attending Ishan Cassidy MD   Hosp Day # 0  [facility]     Chief Complain • Hypokalemia 9/2007   • Leukocytosis 11/09/2007   • Malignant neoplasm of prostate ([facility] [address] 3/26/2012    pt does not want radiation   • Metabolic syndrome    • [facility] 9/07    w/3 vessel coronary artery disease, s/ stent x3 and RCA.    • Ne Coughing    Medications:  No current facility-administered medications on file prior to encounter.   GABAPENTIN 300 MG Oral Cap, TAKE 1 CAPSULE BY MOUTH THREE TIMES A DAY, Disp: 270 capsule, Rfl: 0  spironolactone 50 MG Oral Tab, Take 1 tablet (50 mg total) Does not apply Misc, 1 Box by Finger stick route As Directed. Check blood sugar 4X daily and as needed. Dx E11.9, E11.65, Disp: 100 each, Rfl: 5  Glucose Blood ([facility] GLUCOSE METER TEST STRIPS) In Vitro Strip, Check blood sugar 4X daily and as needed.  Dx E11 distress. Alert and oriented x 3. HEENT: Normocephalic atraumatic. Moist mucous membranes. EOM-I. PERRLA. Anicteric. Neck: No lymphadenopathy. No JVD. No carotid bruits. Respiratory: Clear to auscultation bilaterally. No wheezes. No rhonchi.   Cardiovasc lasix for LE edema in meantime; cards consult  3. Hx left second toe OM s/p amputation  4. HTN  5. CKD4-at baseline  6. Cad with hx pci  7. dmii-monitor on insulin  8. gildardo-per protocol  9.  COPD-seems stable  10. hypothyroidism    Will do med rec once revie

## 2021-10-29 NOTE — TELEPHONE ENCOUNTER
Patient's son is calling re dad. Diabetes. L leg has 4 sores, one opened last night, silver dollar size. Was oozing and bleeding pretty good. Not painful now. Son would like it looked at today. Wants to know if he should go to ER.     Pls advise

## 2021-10-29 NOTE — TELEPHONE ENCOUNTER
Please see below. .   [facility] does not have openings available today. Can we send him to [facility] (has availability)? Or ED?

## 2021-10-29 NOTE — TELEPHONE ENCOUNTER
Noted.  S/w Clay/son. On pts HIPAA consent. Ana Laura Moise to take Frankie to ED today for evaluation and treatment due to the nature of the wound. Kamilah Christopher is agreeable to plan, sts [facility] will be taken to ED today.

## 2021-10-30 ENCOUNTER — APPOINTMENT (OUTPATIENT)
Dept: GENERAL RADIOLOGY | Facility: HOSPITAL | Age: 76
DRG: 602 | End: 2021-10-30
Attending: INTERNAL MEDICINE
Payer: MEDICARE

## 2021-10-30 ENCOUNTER — APPOINTMENT (OUTPATIENT)
Dept: CV DIAGNOSTICS | Facility: HOSPITAL | Age: 76
DRG: 602 | End: 2021-10-30
Attending: HOSPITALIST
Payer: MEDICARE

## 2021-10-30 PROCEDURE — 93306 TTE W/DOPPLER COMPLETE: CPT | Performed by: HOSPITALIST

## 2021-10-30 PROCEDURE — 71046 X-RAY EXAM CHEST 2 VIEWS: CPT | Performed by: INTERNAL MEDICINE

## 2021-10-30 PROCEDURE — 99232 SBSQ HOSP IP/OBS MODERATE 35: CPT | Performed by: STUDENT IN AN ORGANIZED HEALTH CARE EDUCATION/TRAINING PROGRAM

## 2021-10-30 NOTE — PROGRESS NOTES
NURSING ADMISSION NOTE      Patient admitted via Cart  Oriented to room 508  Safety precautions initiated. Bed in low position. Call light in reach.

## 2021-10-30 NOTE — PROGRESS NOTES
[facility]     Hospitalist Progress Note     Za Hernandez Patient Status:  Observation    1945 MRN [id number]   [facility] 5NW-A Attending Aury Blount MD   Hosp Day # 0 PCP Alejandro Poster, DO     Chief Complaint: LE re hours.    Inflammatory Markers  No results for input(s): CRP, ROGELIO, LDH, DDIMER in the last 168 hours. Imaging: Imaging data reviewed in Epic.     Medications:   • piperacillin-tazobactam  3.375 g Intravenous Q8H   • Heparin Sodium (Porcine)  5,000 Units Based on patients current state of illness, I anticipate that, after discharge, patient will require TBD.

## 2021-10-30 NOTE — CONSULTS
[facility]  Consultation Note      Zeeshan Negron Patient Status:  Inpatient    1945 MRN [id number]   [facility] 5NW-A Attending Elizabeth Perez MD   Hosp Day # 1  [facility],      Reason for consult [facility]) injection 8 mg, 8 mg, Intravenous, Q6H PRN  Heparin Sodium (Porcine) 5000 UNIT/ML injection 5,000 Units, 5,000 Units, Subcutaneous, 2 times per day  Insulin Aspart Pen (NOVOLOG) 100 UNIT/ML flexpen 1-5 Units, 1-5 Units, Subcutaneous, TID CC and HS atherosclerosis of unspecified type of vessel, native or graft 3/26/2012   • Coronary disease     s/p LAD stent   • Depression    • Disorder of thyroid    • Dysuria 11/5/07    w/burning   • Echocardiogram abnormal 9/7/07    technically difficult study d/t coronary angiogram,left iliofemoral angiogram   • CATH BARE METAL STENT (BMS)     • HEMORRHOIDECTOMY  9/6/07   • OTHER      AAA repair   • OTHER SURGICAL HISTORY  9/07    patent right coronary artery stent   • OTHER SURGICAL HISTORY      rt cataract   • TH HGB 11.0 10/30/2021    HCT 34.7 10/30/2021    .0 10/30/2021    CREATSERUM 2.28 10/30/2021    BUN 47 10/30/2021     10/30/2021    K 4.1 10/30/2021     10/30/2021    CO2 27.0 10/30/2021     10/30/2021    CA 9.1 10/30/2021    MG 2 on losartan, Toprol and aldactone.      Geanie Cooks, MD  Interventional Cardiology  [facility]  Office: [phone]    10/30/2021  4:58 PM

## 2021-10-31 VITALS
RESPIRATION RATE: 18 BRPM | TEMPERATURE: 98 F | DIASTOLIC BLOOD PRESSURE: 54 MMHG | WEIGHT: 303.81 LBS | OXYGEN SATURATION: 94 % | SYSTOLIC BLOOD PRESSURE: 101 MMHG | BODY MASS INDEX: 37.78 KG/M2 | HEIGHT: 75 IN | HEART RATE: 77 BPM

## 2021-10-31 PROCEDURE — 99239 HOSP IP/OBS DSCHRG MGMT >30: CPT | Performed by: STUDENT IN AN ORGANIZED HEALTH CARE EDUCATION/TRAINING PROGRAM

## 2021-10-31 RX ORDER — AMOXICILLIN AND CLAVULANATE POTASSIUM 875; 125 MG/1; MG/1
1 TABLET, FILM COATED ORAL 2 TIMES DAILY
Qty: 10 TABLET | Refills: 0 | Status: SHIPPED | OUTPATIENT
Start: 2021-10-31 | End: 2021-10-31

## 2021-10-31 RX ORDER — AMOXICILLIN AND CLAVULANATE POTASSIUM 875; 125 MG/1; MG/1
1 TABLET, FILM COATED ORAL 2 TIMES DAILY
Qty: 10 TABLET | Refills: 0 | Status: SHIPPED | OUTPATIENT
Start: 2021-10-31 | End: 2021-11-05

## 2021-10-31 NOTE — PHYSICAL THERAPY NOTE
PHYSICAL THERAPY EVALUATION - INPATIENT     Room Number: 462/143-D  Evaluation Date: 10/31/2021  Type of Evaluation: Initial  Physician Order: PT Eval and Treat    Presenting Problem: LLE cellulitis  Reason for Therapy: Mobility Dysfunction and Disch High blood pressure    • High cholesterol    • History of noncompliance with medical treatment 9/07   • Hypercholesterolemia    • Hyperlipidemia    • Hypokalemia 9/2007   • Leukocytosis 11/09/2007   • [facility] 3/26/2012    pt do review: uses RW for ambulation, pt reports hx of falls    SUBJECTIVE  Declining PT, prefers to watch the [employer] game    Patient self-stated goal is rest    OBJECTIVE  Precautions: Bed/chair alarm  Fall Risk: High fall risk    WEIGHT BEARING RESTRICTION  Chayito Kirk (AM-PAC Scale): 43.63   CMS Modifier (G-Code): CK    FUNCTIONAL ABILITY STATUS  Gait Assessment   Gait Assistance: Not tested                   Skilled Therapy Provided:   Evaluation as above  Sit<>stand min A at trunk for initial portion   Tolerated stand training  Rehab Potential : Fair  Frequency (Obs): 3x/week  Number of Visits to Meet Established Goals: 3      CURRENT GOALS    Goal #1 Patient is able to demonstrate supine - sit EOB @ level: supervision     Goal #2 Patient is able to demonstrate transfer

## 2021-10-31 NOTE — PLAN OF CARE
Pt A&ox2-3 confused at baseline. Sats > 90% on RA. BRITNEY. Pt removed tele and did not want it re connected at this time. VSS. IV Lasix. Pt voids per the bathroom. Bed and chair alarm present at this time. Other safety precautions in place.  1800 ADA with a QI

## 2021-10-31 NOTE — PLAN OF CARE
Pt is A&Ox2- at baseline according to family. Agitated and confused. VSS, afebrile. Maintaining O2 sats WDL on RA. BRITNEY- refuses CPAP. Tele, NSR. Heparin. IV lasix. Last BM 10/29. Carb controlled diet, QID accu check. Voids. Up SBA w/ walker. Denies pain.  P

## 2021-10-31 NOTE — DISCHARGE SUMMARY
[facility] HOSPITALIST  DISCHARGE SUMMARY     Patrick Oquendo Patient Status:  Inpatient    1945 MRN [id number]   [facility] 5NW-A Attending Sanford Ware MD   [facility] Day # 2 PCP TRISTAN POZO,      Date of Admission: 10/29/2021  D aspirin 81 MG Tbec      Take 1 tablet (81 mg total) by mouth daily. Quantity: 100 tablet  Refills: 3     clonazePAM 2 MG Tabs  Commonly known as: KLONOPIN      Take 4 mg by mouth every evening.    Refills: 0     clonazePAM 2 MG Tabs  Commonly known as: evening. Refills: 0     losartan 100 MG Tabs  Commonly known as: COZAAR      Take 1 tablet (100 mg total) by mouth daily. Quantity: 90 tablet  Refills: 3     metoprolol succinate 50 MG Tb24  Commonly known as:  Toprol XL      Take 1 tablet (50 mg total) Respiratory: Clear to auscultation bilaterally. No wheezes. No rhonchi. Cardiovascular: S1, S2. Regular rate and rhythm. No murmurs, rubs or gallops. Abdomen: Soft, nontender, nondistended. Positive bowel sounds. No rebound or guarding.   Neurologic:

## 2021-10-31 NOTE — DISCHARGE PLANNING
NURSING DISCHARGE NOTE    Discharged Home via Wheelchair. Accompanied by Support staff  Belongings Taken by patient/family   PIV removed. Discharge AVS and f/u orders discussed with patient's son Ruben Dyer- verbalized understanding.    Printed prescripti

## 2021-10-31 NOTE — PROGRESS NOTES
[facility]     Hospitalist Progress Note     Za Hernandez Patient Status:  Observation    1945 MRN [id number]   [facility] Attending Aury Blount MD   Hosp Day # 2 PCP Alejandro Poster, DO     Chief Complaint: LE re Markers  No results for input(s): CRP, ROGELIO, LDH, DDIMER in the last 168 hours. Imaging: Imaging data reviewed in Epic.     Medications:   • piperacillin-tazobactam  3.375 g Intravenous Q8H   • Heparin Sodium (Porcine)  5,000 Units Subcutaneous 2 times pe

## 2021-10-31 NOTE — PROGRESS NOTES
Problem: LLE cellulitis     Data: Patient is alert and oriented X2, confused and impulsive, patient wandering in hallway, bed and chair alarm in place.  Spoke with patient's wife Francine Moncada and she reports this is \"normal for him\", wife also reports \"confusio

## 2021-10-31 NOTE — PROGRESS NOTES
[facility]  Cardiology Progress Note    Za Hernandez Patient Status:  Inpatient    1945 MRN [id number]   [facility] 5NW-A Attending Aury Blount MD   Hosp Day # 2 PCP TRISTAN POZO, DO       Subjective: Feels ok, no CP breakfast  levothyroxine (SYNTHROID) tab 200 mcg, 200 mcg, Oral, Before breakfast  losartan (COZAAR) tab 100 mg, 100 mg, Oral, Daily  metoprolol succinate (Toprol XL) 24 hr tab 50 mg, 50 mg, Oral, Daily  pantoprazole (PROTONIX) EC tab 40 mg, 40 mg, Oral, Q ([facility]     Panlobular emphysema ([facility]     Type 2 diabetes mellitus with stage 4 chronic kidney disease, without long-term current use of insulin (HCC)     Chest pain, atypical     Elevated troponin     NSTEMI (non-ST elevated myocardial infarction) ([facility] Labs   Lab 10/29/21  1634   ALT 15*   AST 17   ALB 3.0*       No results for input(s): TROP in the last 168 hours. Diagnostics:    Echo 10/30/21:  1. Left ventricle: The cavity size was mildly increased.  Wall thickness was      normal. Systolic functi dysfunction.      Cath 6/4/18:  Conclusions:   -totally occluded RCA with patent proximal RCA and proximal LAD stents.    of distal obtuse marginal branches unchanged from prior.  -Inferior akinesis with severely depressed LV ejection fraction  -Mildly e

## 2021-11-01 ENCOUNTER — PATIENT OUTREACH (OUTPATIENT)
Dept: CASE MANAGEMENT | Age: 76
End: 2021-11-01

## 2021-11-01 ENCOUNTER — TELEPHONE (OUTPATIENT)
Dept: FAMILY MEDICINE CLINIC | Facility: CLINIC | Age: 76
End: 2021-11-01

## 2021-11-01 ENCOUNTER — TELEPHONE (OUTPATIENT)
Dept: WOUND CARE | Facility: HOSPITAL | Age: 76
End: 2021-11-01

## 2021-11-01 DIAGNOSIS — R53.1 GENERAL WEAKNESS: ICD-10-CM

## 2021-11-01 DIAGNOSIS — L03.116 LEFT LEG CELLULITIS: Primary | ICD-10-CM

## 2021-11-01 DIAGNOSIS — Z02.9 ENCOUNTERS FOR ADMINISTRATIVE PURPOSE: ICD-10-CM

## 2021-11-01 PROCEDURE — 1111F DSCHRG MED/CURRENT MED MERGE: CPT

## 2021-11-01 RX ORDER — ACETAMINOPHEN 325 MG/1
325 TABLET ORAL EVERY 6 HOURS PRN
COMMUNITY

## 2021-11-01 RX ORDER — TRAMADOL HYDROCHLORIDE 50 MG/1
50 TABLET ORAL EVERY 8 HOURS PRN
Qty: 30 TABLET | Refills: 0 | Status: SHIPPED | OUTPATIENT
Start: 2021-11-01

## 2021-11-01 NOTE — PROGRESS NOTES
1st attempt at schedule           [facility], DO   Con [address]  [phone]    Spoke with patient advised has existing apts with  on Nov 8th @10:45 & has existing apt with GRABIEL Lorenzo on Nov 30th @

## 2021-11-01 NOTE — TELEPHONE ENCOUNTER
Before we call back, any recommendations? Are you able to prescribe anything else for pain? Has appt 11/08/21. Pended order for [facility]. Erik Araujo had final visit with [facility] nurse, Karla Moody, last week.

## 2021-11-01 NOTE — TELEPHONE ENCOUNTER
Noted.  S/w Zach/son. On pts HIPAA consent. Advised of signed [facility] referral for home PT/RN. Carefully instructed Colten Jovel on orders for Tramadol use. --Q8 hours PRN  Informed NOT to take Tramadol at the same time as Klonopin as both can run risk of stopping

## 2021-11-01 NOTE — TELEPHONE ENCOUNTER
Received call from Xochi/ nurse from wound care clinic.   Rosita Caraballo she does not see any documentation that shows the pt being evaluated by wound care while at [facility].  Sts she will review records to see what dressing supplies were used inpatient, and

## 2021-11-01 NOTE — TELEPHONE ENCOUNTER
Call to [facility] wound care clinic [phone] option 2 for dr Camilo Favors reaches voice mail.  Left vmm req call back to triage nurse in our ofc today to confirm if they are or were involved w pt wound care during inpt admit 10/29 to 10/31/21-pt's family calling w

## 2021-11-01 NOTE — TELEPHONE ENCOUNTER
S/w Flavia/wife. She sts Frankie was sent home with a bag of dressing supplies at time of discharge and was never given instructions on dressing changes. Upon review of AVS, no instructions are listed in terms of dressing changes.     Cata Regerber there is

## 2021-11-01 NOTE — TELEPHONE ENCOUNTER
Agree with recommendations. She can call wound care to discuss any questions that she has regarding maintenance of his wound.

## 2021-11-01 NOTE — TELEPHONE ENCOUNTER
Home health orders signed. We have to be careful with the medications that he is taking with any pain medication given due to risk of respiratory depression. He cannot mix pain medication with high doses of klonopin.   Okay to start tramadol 50 mg every 8

## 2021-11-01 NOTE — TELEPHONE ENCOUNTER
Dr. Javier Bedolla office asking if nurse could call patient/spouse in regards to wound care. Patient has not been seen in clinic. Writer spoke with wife explained no orders can be given.  If and when dressings are changed and drainage or any open areas noted - pt

## 2021-11-01 NOTE — TELEPHONE ENCOUNTER
S/w patient's wife Santos Perez. She states that since patient discharged he is not able to walk without max assist or get himself out of a chair without help. She states that he is having a great amount of back, leg and knee pain.  She rates a 8-10/10 and that is

## 2021-11-01 NOTE — TELEPHONE ENCOUNTER
Pt was released from [facility] 10/31/21. Cellulitis R leg. Wife has questions regarding his at home care. Does not know what to do with supplies sent home with patient. Has hospital f/u with Dr Saritha Vanessa 11/8/21. Pls advise.

## 2021-11-01 NOTE — PROGRESS NOTES
Initial Post Discharge Follow Up   Discharge Date: 10/31/21  Contact Date: 11/1/2021    Consent Verification:  Assessment Completed With: Spouse: Rachael Ee received per patient?  written   HIPAA Verified?   Yes    Discharge Dx:   Cellulitis of lef evening. • clonazePAM 2 MG Oral Tab Take 4 mg by mouth Noon. • clonazePAM 2 MG Oral Tab Take 4 mg by mouth every morning. • gabapentin 300 MG Oral Cap Take 300 mg by mouth 3 (three) times daily.      • glipiZIDE 10 MG Oral Tab Take 10 mg by mout anything? yes  • Are there any reasons that keep you from taking your medication as prescribed? No  Are you having any concerns with constipation? No    Referrals/orders at D/C:  Home Health/Services ordered at D/C? No      DME ordered at D/C? No appointment with pt:  Yes      Have you made all of your follow up appointments? yes    Is there any reason as to why you cannot make your appointments?    No     NCM Reviewed upcoming Specialist Appt with patient     Yes       Interventions by NCM: GRIS rev changes or updates to medications and or orders sent to PCP.

## 2021-11-08 ENCOUNTER — TELEPHONE (OUTPATIENT)
Dept: FAMILY MEDICINE CLINIC | Facility: CLINIC | Age: 76
End: 2021-11-08

## 2021-11-08 ENCOUNTER — PATIENT OUTREACH (OUTPATIENT)
Dept: CASE MANAGEMENT | Age: 76
End: 2021-11-08

## 2021-11-08 NOTE — TELEPHONE ENCOUNTER
Pt's son called to cancel today's appt w/Dr. Carleen Ross. Pt fell on Friday, and was admitted to [facility]. Pt will be discharged to rehab facility. Son calling to advise Dr. Carleen Ross of patient's condition.

## 2021-11-08 NOTE — PROGRESS NOTES
Spoke to wife Santos Perez, patient is currently admitted to [facility] Patient will be transferred to a short term rehabilitation. CCM program information will be mailed. I will follow up 1-2 wks.

## 2021-11-11 ENCOUNTER — MED REC SCAN ONLY (OUTPATIENT)
Dept: FAMILY MEDICINE CLINIC | Facility: CLINIC | Age: 76
End: 2021-11-11

## 2021-11-15 ENCOUNTER — TELEPHONE (OUTPATIENT)
Dept: FAMILY MEDICINE CLINIC | Facility: CLINIC | Age: 76
End: 2021-11-15

## 2021-11-15 NOTE — TELEPHONE ENCOUNTER
Caller requesting order to resume [facility] following pt's recent hospitalization.

## 2021-11-15 NOTE — TELEPHONE ENCOUNTER
S/w Pamela. FYI  Sts she just found out that Myles Sorenson is at a SNF for short term rehab. Not aware of exact facility. Was admitted to [facility] on 11/05/21 for  \"chest pain since this am. Pt fell attempting to get up to the bathroom.  Denies head trauma, LO

## 2021-11-23 DIAGNOSIS — E03.9 HYPOTHYROIDISM, UNSPECIFIED: ICD-10-CM

## 2021-11-23 RX ORDER — LEVOTHYROXINE SODIUM 0.2 MG/1
TABLET ORAL
Qty: 30 TABLET | Refills: 0 | Status: SHIPPED | OUTPATIENT
Start: 2021-11-23 | End: 2022-01-04

## 2021-11-23 NOTE — TELEPHONE ENCOUNTER
LOV: Virtual phone 3/12/21  for: Hypothyroidism  Patient advised to RTC on:  3 months  Previous Rx:  Levothyroxine 200mcgà Sig: Take I tablet by mouth before breakfast. Disp #/ refills.   LF: pt reported

## 2021-11-24 ENCOUNTER — NURSING HOME VISIT (OUTPATIENT)
Dept: NEPHROLOGY | Age: 76
End: 2021-11-24

## 2021-11-24 VITALS
SYSTOLIC BLOOD PRESSURE: 123 MMHG | BODY MASS INDEX: 33.6 KG/M2 | DIASTOLIC BLOOD PRESSURE: 60 MMHG | OXYGEN SATURATION: 99 % | HEIGHT: 78 IN | RESPIRATION RATE: 18 BRPM | HEART RATE: 65 BPM | TEMPERATURE: 97.3 F | WEIGHT: 290.4 LBS

## 2021-11-24 DIAGNOSIS — R60.0 EDEMA OF BOTH LEGS: ICD-10-CM

## 2021-11-24 DIAGNOSIS — N18.4 CKD (CHRONIC KIDNEY DISEASE) STAGE 4, GFR 15-29 ML/MIN (CMD): Primary | ICD-10-CM

## 2021-11-24 PROCEDURE — 99309 SBSQ NF CARE MODERATE MDM 30: CPT | Performed by: NURSE PRACTITIONER

## 2021-11-30 ENCOUNTER — NURSING HOME VISIT (OUTPATIENT)
Dept: NEPHROLOGY | Age: 76
End: 2021-11-30

## 2021-11-30 VITALS
TEMPERATURE: 98.6 F | SYSTOLIC BLOOD PRESSURE: 142 MMHG | OXYGEN SATURATION: 97 % | WEIGHT: 304.2 LBS | DIASTOLIC BLOOD PRESSURE: 84 MMHG | BODY MASS INDEX: 35.15 KG/M2 | RESPIRATION RATE: 16 BRPM | HEART RATE: 83 BPM

## 2021-11-30 DIAGNOSIS — I87.2 VENOUS (PERIPHERAL) INSUFFICIENCY: ICD-10-CM

## 2021-11-30 DIAGNOSIS — N18.32 STAGE 3B CHRONIC KIDNEY DISEASE (CMD): ICD-10-CM

## 2021-11-30 DIAGNOSIS — R60.0 EDEMA OF BOTH LEGS: Primary | ICD-10-CM

## 2021-11-30 PROCEDURE — 99309 SBSQ NF CARE MODERATE MDM 30: CPT | Performed by: NURSE PRACTITIONER

## 2021-12-03 ENCOUNTER — MED REC SCAN ONLY (OUTPATIENT)
Dept: FAMILY MEDICINE CLINIC | Facility: CLINIC | Age: 76
End: 2021-12-03

## 2021-12-08 ENCOUNTER — NURSING HOME VISIT (OUTPATIENT)
Dept: NEPHROLOGY | Age: 76
End: 2021-12-08

## 2021-12-08 ENCOUNTER — TELEPHONE (OUTPATIENT)
Dept: FAMILY MEDICINE CLINIC | Facility: CLINIC | Age: 76
End: 2021-12-08

## 2021-12-08 VITALS
OXYGEN SATURATION: 93 % | HEART RATE: 83 BPM | TEMPERATURE: 97 F | RESPIRATION RATE: 18 BRPM | BODY MASS INDEX: 33.93 KG/M2 | DIASTOLIC BLOOD PRESSURE: 56 MMHG | SYSTOLIC BLOOD PRESSURE: 126 MMHG | WEIGHT: 293.6 LBS

## 2021-12-08 DIAGNOSIS — N18.32 STAGE 3B CHRONIC KIDNEY DISEASE (CMD): ICD-10-CM

## 2021-12-08 DIAGNOSIS — R60.0 EDEMA OF BOTH LEGS: Primary | ICD-10-CM

## 2021-12-08 DIAGNOSIS — I50.32 CHRONIC DIASTOLIC HF (HEART FAILURE) (CMD): ICD-10-CM

## 2021-12-08 PROCEDURE — 99309 SBSQ NF CARE MODERATE MDM 30: CPT | Performed by: NURSE PRACTITIONER

## 2021-12-08 NOTE — TELEPHONE ENCOUNTER
Please call patient's wife and see what we can help with. Typically if he is being taken to the emergency room with worsening edema, they will know how to handle treatment options.   I am still seeing patients and cannot call her right now but if she has a

## 2021-12-08 NOTE — TELEPHONE ENCOUNTER
Call to dawn/spouse-sts pt has been at [facility] [employer] for approx past month. sts for past 2 wks pt's legs were swollen but today \"legs were huge and also hard.  rochelle-not sure of her title or where she is from but she saw him today and said his legs

## 2021-12-08 NOTE — TELEPHONE ENCOUNTER
Pt's spouse calling, she's quite upset, and would like a call back. Pt is being taken to [facility] ER with severe edema in pt's legs. She has been told that if they can not control the edema, pt would need to go on dialysis.  Pt has stated he does n

## 2021-12-08 NOTE — TELEPHONE ENCOUNTER
Pt's wife is requesting to speak w/Dr Abarca or nurse. States she was at [place] today where patient lives. Said the medications he is currently taking are \"way of out whack\". Needs to discuss medications. Pls advise.

## 2021-12-09 PROCEDURE — 99223 1ST HOSP IP/OBS HIGH 75: CPT | Performed by: INTERNAL MEDICINE

## 2021-12-09 NOTE — TELEPHONE ENCOUNTER
Thanks for update. I will watch for ER notes. I would like to see him when he is discharged from the hospital along with his wife and son.

## 2021-12-10 PROCEDURE — 99232 SBSQ HOSP IP/OBS MODERATE 35: CPT | Performed by: INTERNAL MEDICINE

## 2021-12-11 PROCEDURE — 99232 SBSQ HOSP IP/OBS MODERATE 35: CPT | Performed by: INTERNAL MEDICINE

## 2021-12-12 PROCEDURE — 99232 SBSQ HOSP IP/OBS MODERATE 35: CPT | Performed by: INTERNAL MEDICINE

## 2021-12-13 PROCEDURE — 99232 SBSQ HOSP IP/OBS MODERATE 35: CPT | Performed by: INTERNAL MEDICINE

## 2021-12-15 ENCOUNTER — NURSING HOME VISIT (OUTPATIENT)
Dept: NEPHROLOGY | Age: 76
End: 2021-12-15

## 2021-12-15 VITALS
OXYGEN SATURATION: 97 % | WEIGHT: 295.8 LBS | DIASTOLIC BLOOD PRESSURE: 70 MMHG | HEART RATE: 82 BPM | TEMPERATURE: 96.9 F | RESPIRATION RATE: 18 BRPM | SYSTOLIC BLOOD PRESSURE: 142 MMHG | BODY MASS INDEX: 34.18 KG/M2

## 2021-12-15 DIAGNOSIS — N17.9 AKI (ACUTE KIDNEY INJURY) (CMD): Primary | ICD-10-CM

## 2021-12-15 DIAGNOSIS — R60.0 EDEMA OF BOTH LEGS: ICD-10-CM

## 2021-12-15 DIAGNOSIS — N18.31 STAGE 3A CHRONIC KIDNEY DISEASE (CMD): ICD-10-CM

## 2021-12-15 PROCEDURE — 99309 SBSQ NF CARE MODERATE MDM 30: CPT | Performed by: NURSE PRACTITIONER

## 2021-12-16 DIAGNOSIS — K21.9 GASTRO-ESOPHAGEAL REFLUX DISEASE WITHOUT ESOPHAGITIS: ICD-10-CM

## 2021-12-16 RX ORDER — OMEPRAZOLE 40 MG/1
CAPSULE, DELAYED RELEASE ORAL
Qty: 90 CAPSULE | Refills: 0 | Status: SHIPPED | OUTPATIENT
Start: 2021-12-16

## 2021-12-16 NOTE — TELEPHONE ENCOUNTER
Due for vs, ws in hospital recently. Wife advised he needs appt (TE 12.8)  Please approve if appropriate. Thanks.

## 2021-12-21 ENCOUNTER — NURSING HOME VISIT (OUTPATIENT)
Dept: NEPHROLOGY | Age: 76
End: 2021-12-21

## 2021-12-21 VITALS
HEART RATE: 72 BPM | BODY MASS INDEX: 34.18 KG/M2 | RESPIRATION RATE: 18 BRPM | OXYGEN SATURATION: 97 % | WEIGHT: 295.8 LBS | SYSTOLIC BLOOD PRESSURE: 142 MMHG | DIASTOLIC BLOOD PRESSURE: 88 MMHG | TEMPERATURE: 97.2 F

## 2021-12-21 DIAGNOSIS — R60.0 EDEMA OF BOTH LEGS: ICD-10-CM

## 2021-12-21 DIAGNOSIS — N18.31 STAGE 3A CHRONIC KIDNEY DISEASE (CMD): Primary | ICD-10-CM

## 2021-12-21 DIAGNOSIS — I50.32 CHRONIC DIASTOLIC HF (HEART FAILURE) (CMD): ICD-10-CM

## 2021-12-21 PROCEDURE — 99309 SBSQ NF CARE MODERATE MDM 30: CPT | Performed by: NURSE PRACTITIONER

## 2021-12-28 ENCOUNTER — NURSING HOME VISIT (OUTPATIENT)
Dept: NEPHROLOGY | Age: 76
End: 2021-12-28

## 2021-12-28 VITALS
DIASTOLIC BLOOD PRESSURE: 98 MMHG | HEART RATE: 86 BPM | RESPIRATION RATE: 16 BRPM | TEMPERATURE: 98.2 F | SYSTOLIC BLOOD PRESSURE: 148 MMHG | WEIGHT: 290.2 LBS | OXYGEN SATURATION: 96 % | BODY MASS INDEX: 33.54 KG/M2

## 2021-12-28 DIAGNOSIS — I50.32 CHRONIC DIASTOLIC HF (HEART FAILURE) (CMD): ICD-10-CM

## 2021-12-28 DIAGNOSIS — N18.32 STAGE 3B CHRONIC KIDNEY DISEASE (CMD): Primary | ICD-10-CM

## 2021-12-28 PROCEDURE — 99309 SBSQ NF CARE MODERATE MDM 30: CPT | Performed by: NURSE PRACTITIONER

## 2022-01-04 ENCOUNTER — TELEPHONE (OUTPATIENT)
Dept: FAMILY MEDICINE CLINIC | Facility: CLINIC | Age: 77
End: 2022-01-04

## 2022-01-04 DIAGNOSIS — E03.9 HYPOTHYROIDISM, UNSPECIFIED: ICD-10-CM

## 2022-01-04 RX ORDER — LEVOTHYROXINE SODIUM 0.2 MG/1
200 TABLET ORAL
Qty: 30 TABLET | Refills: 0 | Status: SHIPPED | OUTPATIENT
Start: 2022-01-04

## 2022-01-04 NOTE — TELEPHONE ENCOUNTER
LOV: 7/24/21  for: ER follow up  Patient advised to RTC on:  None stated  Pt was recently in hospital.    LEVOTHYROXINE 200 MCG Oral Tab 30 tablet 0 11/23/2021    Sig:   TAKE 1 TABLET BY MOUTH EVERY DAY BEFORE BREAKFAST

## 2022-01-05 ENCOUNTER — EXTERNAL RECORD (OUTPATIENT)
Dept: OTHER | Age: 77
End: 2022-01-05

## 2022-01-05 ENCOUNTER — NURSING HOME VISIT (OUTPATIENT)
Dept: NEPHROLOGY | Age: 77
End: 2022-01-05

## 2022-01-05 VITALS
BODY MASS INDEX: 34.5 KG/M2 | DIASTOLIC BLOOD PRESSURE: 64 MMHG | SYSTOLIC BLOOD PRESSURE: 130 MMHG | RESPIRATION RATE: 18 BRPM | WEIGHT: 298.5 LBS | OXYGEN SATURATION: 95 % | HEART RATE: 65 BPM

## 2022-01-05 DIAGNOSIS — I50.32 CHRONIC DIASTOLIC HF (HEART FAILURE) (CMD): ICD-10-CM

## 2022-01-05 DIAGNOSIS — R60.0 EDEMA OF BOTH LEGS: ICD-10-CM

## 2022-01-05 DIAGNOSIS — U07.1 COVID-19 VIRUS INFECTION: ICD-10-CM

## 2022-01-05 DIAGNOSIS — N18.31 STAGE 3A CHRONIC KIDNEY DISEASE (CMD): Primary | ICD-10-CM

## 2022-01-05 LAB — LIPASE: 18 U/L (ref 11–82)

## 2022-01-05 PROCEDURE — 99309 SBSQ NF CARE MODERATE MDM 30: CPT | Performed by: NURSE PRACTITIONER

## 2022-01-06 ENCOUNTER — PATIENT OUTREACH (OUTPATIENT)
Dept: CASE MANAGEMENT | Age: 77
End: 2022-01-06

## 2022-01-06 ENCOUNTER — OFF PREMISE (OUTPATIENT)
Dept: OTHER | Age: 77
End: 2022-01-06

## 2022-01-06 ENCOUNTER — EXTERNAL RECORD (OUTPATIENT)
Dept: NEPHROLOGY | Age: 77
End: 2022-01-06

## 2022-01-06 LAB
*MEAN CORPUSCULAR HGB CONC: 31.9 G/DL (ref 32.5–35.8)
A/G RATIO: 1.1 (ref 1.1–2.4)
ALANINE AMINOTRANSFE: 10 U/L (ref 7–52)
ALBUMIN, SERUM (ALB): 3.2 G/DL (ref 3.5–5.7)
ALKALINE PHOSPHATASE (ALK): 88 U/L (ref 34–104)
ANION GAP: 9 MEQ/L (ref 6.2–14.7)
ASPARTATE AMINOTRANS: 16 U/L (ref 13–39)
BASOPHIL AUTOMATED: 0.2 %
BASOPHILS: 0 (ref 0–0.14)
BEDSIDE GLUCOSE: 156 MG/DL (ref 70–110)
BILIRUBIN, TOTAL: 0.4 MG/DL (ref 0.2–0.8)
BLOOD UREA NITROGEN (BUN): 30 MG/DL (ref 7–25)
BUN/CREATININE RATIO: 18.9 (ref 7.4–23)
C-REACTIVE PROTEIN, TITR: 0.9 MG/DL (ref 0–0.9)
CALCIUM, SERUM: 9.1 MG/DL (ref 8.6–10.3)
CARBON DIOXIDE: 35 MEQ/L (ref 21–31)
CHLORIDE, SERUM: 97 MMOL/L (ref 98–107)
CHRONIC KIDNEY DISEASE STAGE: ABNORMAL
CREATININE: 1.59 MG/DL (ref 0.7–1.3)
EOSINOPHIL AUTOMATED: 0.1 %
EOSINOPHILS: 0 (ref 0–0.6)
EST GLOMERULAR FILTRATION RATE: 42 ML/MIN
GLUCOSE: 122 MG/DL (ref 70–99)
HEMATOCRIT: 33.1 % (ref 38.6–49.2)
HEMOGLOBIN: 10.6 GM/DL (ref 13–17)
K (POTASSIUM, SERUM): 4 MMOL/L (ref 3.5–5.2)
LYMPHOCYTE AUTOMATED: 24.2 %
LYMPHOCYTES: 1.4 (ref 0.78–3.73)
MEAN CORPUSCULAR HGB: 27.9 PG (ref 26–34)
MEAN CORPUSCULAR VOL: 87.4 FL (ref 80–100)
MEAN PLATELET VOLUME: 8.3 FL (ref 6.8–10.2)
MONOCYTE AUTOMATED: 7.4 %
MONOCYTES: 0.4 (ref 0.17–1)
NA (SODIUM, SERUM): 141 MMOL/L (ref 133–144)
NEUTROPHIL ABSOLUTE: 3.8 (ref 1.91–7.6)
NEUTROPHIL AUTOMATED: 68.1 %
PLATELET COUNT: 218 K/MM3 (ref 150–450)
PROTEIN TOTAL: 6.1 G/DL (ref 6.4–8.9)
RED BLOOD CELL COUNT: 3.79 M/MM3 (ref 4.34–5.6)
RED CELL DISTRIBUTIO: 15.5 % (ref 11.9–15.9)
WHITE BLOOD CELL COU: 5.6 K/MM3 (ref 4–11)

## 2022-01-06 PROCEDURE — 99221 1ST HOSP IP/OBS SF/LOW 40: CPT | Performed by: INTERNAL MEDICINE

## 2022-01-06 NOTE — PROGRESS NOTES
Spoke to wife Janae Mccormick, patient is currently admitted at [facility]. She is unsure if patient will be transferred to [address] [place] [address]. Patient identified with a potential need for Chronic Care Management services.   Called patient to introduce self and

## 2022-01-07 ENCOUNTER — OFF PREMISE (OUTPATIENT)
Dept: OTHER | Age: 77
End: 2022-01-07

## 2022-01-07 LAB
*MEAN CORPUSCULAR HGB CONC: 33 G/DL (ref 32.5–35.8)
A/G RATIO: 1.07 (ref 1.1–2.4)
ALANINE AMINOTRANSFE: 10 U/L (ref 7–52)
ALBUMIN, SERUM (ALB): 3 G/DL (ref 3.5–5.7)
ALKALINE PHOSPHATASE (ALK): 82 U/L (ref 34–104)
ANION GAP: 4 MEQ/L (ref 6.2–14.7)
APPEARANCE: CLEAR
ASPARTATE AMINOTRANS: 15 U/L (ref 13–39)
BASOPHIL AUTOMATED: 0.4 %
BASOPHILS: 0 (ref 0–0.14)
BEDSIDE GLUCOSE: 128 MG/DL (ref 70–110)
BEDSIDE GLUCOSE: 161 MG/DL (ref 70–110)
BEDSIDE GLUCOSE: 179 MG/DL (ref 70–110)
BEDSIDE GLUCOSE: 221 MG/DL (ref 70–110)
BILIRUBIN, TOTAL: 0.4 MG/DL (ref 0.2–0.8)
BILIRUBIN: NORMAL
BLOOD UREA NITROGEN (BUN): 28 MG/DL (ref 7–25)
BUN/CREATININE RATIO: 17.8 (ref 7.4–23)
CALCIUM, SERUM: 9 MG/DL (ref 8.6–10.3)
CARBON DIOXIDE: 39 MEQ/L (ref 21–31)
CHLORIDE, SERUM: 99 MMOL/L (ref 98–107)
CHRONIC KIDNEY DISEASE STAGE: ABNORMAL
COLOR: YELLOW
CREATININE: 1.57 MG/DL (ref 0.7–1.3)
EOSINOPHIL AUTOMATED: 0.1 %
EOSINOPHILS: 0 (ref 0–0.6)
EST GLOMERULAR FILTRATION RATE: 43 ML/MIN
GLUCOSE, URINE, AUTOMATED: NORMAL MG/DL
GLUCOSE: 145 MG/DL (ref 70–99)
HEMATOCRIT: 32 % (ref 38.6–49.2)
HEMOGLOBIN: 10.6 GM/DL (ref 13–17)
K (POTASSIUM, SERUM): 3.7 MMOL/L (ref 3.5–5.2)
KETONES, URINE, AUTOMATED: NORMAL MG/DL
LEUKOCYTE, URINE, AUTOMATED: NEGATIVE
LYMPHOCYTE AUTOMATED: 26 %
LYMPHOCYTES: 1.4 (ref 0.78–3.73)
MEAN CORPUSCULAR HGB: 28.4 PG (ref 26–34)
MEAN CORPUSCULAR VOL: 86.2 FL (ref 80–100)
MEAN PLATELET VOLUME: 8.5 FL (ref 6.8–10.2)
MG (MAGNESIUM, SERUM: 2.1 MG/DL (ref 1.6–2.6)
MONOCYTE AUTOMATED: 10.9 %
MONOCYTES: 0.6 (ref 0.17–1)
NA (SODIUM, SERUM): 142 MMOL/L (ref 133–144)
NEUTROPHIL ABSOLUTE: 3.5 (ref 1.91–7.6)
NEUTROPHIL AUTOMATED: 62.6 %
NITRITE, URINE AUTOMATED: NEGATIVE
PH, URINE: 7 (ref 5–8)
PLATELET COUNT: 203 K/MM3 (ref 150–450)
PROTEIN TOTAL: 5.8 G/DL (ref 6.4–8.9)
PROTEIN, URINE: NORMAL MG/DL
RED BLOOD CELL COUNT: 3.71 M/MM3 (ref 4.34–5.6)
RED CELL DISTRIBUTIO: 15.6 % (ref 11.9–15.9)
SPECIFIC GRAVITY UA: 1.01 (ref 1–1.03)
T4(THYROXINE), FREE REFLEX: 0.95 NG/DL (ref 0.55–1.6)
THYROID STIMULATING: 9.66 MIU/ML (ref 0.27–4.2)
URINE, BLOOD, AUTOMATED: NORMAL
UROBILINOGEN, URINE, AUTOMATED: NORMAL MG/DL
WHITE BLOOD CELL COU: 5.5 K/MM3 (ref 4–11)

## 2022-01-07 PROCEDURE — 99232 SBSQ HOSP IP/OBS MODERATE 35: CPT | Performed by: INTERNAL MEDICINE

## 2022-01-08 ENCOUNTER — OFF PREMISE (OUTPATIENT)
Dept: OTHER | Age: 77
End: 2022-01-08

## 2022-01-08 LAB
*MEAN CORPUSCULAR HGB CONC: 32.7 G/DL (ref 32.5–35.8)
A/G RATIO: 1.15 (ref 1.1–2.4)
ALANINE AMINOTRANSFE: 10 U/L (ref 7–52)
ALBUMIN, SERUM (ALB): 3.1 G/DL (ref 3.5–5.7)
ALKALINE PHOSPHATASE (ALK): 78 U/L (ref 34–104)
ANION GAP: 7 MEQ/L (ref 6.2–14.7)
ASPARTATE AMINOTRANS: 14 U/L (ref 13–39)
BASOPHIL AUTOMATED: 0.4 %
BASOPHILS: 0 (ref 0–0.14)
BEDSIDE GLUCOSE: 127 MG/DL (ref 70–110)
BEDSIDE GLUCOSE: 160 MG/DL (ref 70–110)
BEDSIDE GLUCOSE: 195 MG/DL (ref 70–110)
BEDSIDE GLUCOSE: 208 MG/DL (ref 70–110)
BILIRUBIN, TOTAL: 0.4 MG/DL (ref 0.2–0.8)
BLOOD UREA NITROGEN (BUN): 33 MG/DL (ref 7–25)
BUN/CREATININE RATIO: 19.1 (ref 7.4–23)
CALCIUM, SERUM: 9.3 MG/DL (ref 8.6–10.3)
CARBON DIOXIDE: 36 MEQ/L (ref 21–31)
CHLORIDE, SERUM: 100 MMOL/L (ref 98–107)
CHRONIC KIDNEY DISEASE STAGE: ABNORMAL
CREATININE: 1.73 MG/DL (ref 0.7–1.3)
EOSINOPHIL AUTOMATED: 0.3 %
EOSINOPHILS: 0 (ref 0–0.6)
EST GLOMERULAR FILTRATION RATE: 38 ML/MIN
GLUCOSE: 127 MG/DL (ref 70–99)
HEMATOCRIT: 32.7 % (ref 38.6–49.2)
HEMOGLOBIN: 10.7 GM/DL (ref 13–17)
K (POTASSIUM, SERUM): 3.6 MMOL/L (ref 3.5–5.2)
LYMPHOCYTE AUTOMATED: 27.6 %
LYMPHOCYTES: 1.4 (ref 0.78–3.73)
MEAN CORPUSCULAR HGB: 28.3 PG (ref 26–34)
MEAN CORPUSCULAR VOL: 86.8 FL (ref 80–100)
MEAN PLATELET VOLUME: 8.6 FL (ref 6.8–10.2)
MONOCYTE AUTOMATED: 10.8 %
MONOCYTES: 0.6 (ref 0.17–1)
NA (SODIUM, SERUM): 143 MMOL/L (ref 133–144)
NEUTROPHIL ABSOLUTE: 3.2 (ref 1.91–7.6)
NEUTROPHIL AUTOMATED: 60.9 %
PLATELET COUNT: 206 K/MM3 (ref 150–450)
PROTEIN TOTAL: 5.8 G/DL (ref 6.4–8.9)
RED BLOOD CELL COUNT: 3.77 M/MM3 (ref 4.34–5.6)
RED CELL DISTRIBUTIO: 16 % (ref 11.9–15.9)
WHITE BLOOD CELL COU: 5.2 K/MM3 (ref 4–11)

## 2022-01-08 PROCEDURE — 99232 SBSQ HOSP IP/OBS MODERATE 35: CPT | Performed by: INTERNAL MEDICINE

## 2022-01-09 ENCOUNTER — OFF PREMISE (OUTPATIENT)
Dept: OTHER | Age: 77
End: 2022-01-09

## 2022-01-09 LAB
*MEAN CORPUSCULAR HGB CONC: 31.8 G/DL (ref 32.5–35.8)
A/G RATIO: 1.13 (ref 1.1–2.4)
ALANINE AMINOTRANSFE: 10 U/L (ref 7–52)
ALBUMIN, SERUM (ALB): 3.4 G/DL (ref 3.5–5.7)
ALKALINE PHOSPHATASE (ALK): 87 U/L (ref 34–104)
ANION GAP: 8 MEQ/L (ref 6.2–14.7)
ASPARTATE AMINOTRANS: 16 U/L (ref 13–39)
BASOPHIL AUTOMATED: 0.3 %
BASOPHILS: 0 (ref 0–0.14)
BEDSIDE GLUCOSE: 209 MG/DL (ref 70–110)
BEDSIDE GLUCOSE: 226 MG/DL (ref 70–110)
BEDSIDE GLUCOSE: 250 MG/DL (ref 70–110)
BILIRUBIN, TOTAL: 0.4 MG/DL (ref 0.2–0.8)
BLOOD UREA NITROGEN (BUN): 35 MG/DL (ref 7–25)
BUN/CREATININE RATIO: 17.7 (ref 7.4–23)
C-REACTIVE PROTEIN, TITR: 0.5 MG/DL (ref 0–0.9)
CALCIUM, SERUM: 9.5 MG/DL (ref 8.6–10.3)
CARBON DIOXIDE: 36 MEQ/L (ref 21–31)
CHLORIDE, SERUM: 97 MMOL/L (ref 98–107)
CHRONIC KIDNEY DISEASE STAGE: ABNORMAL
CREATININE: 1.98 MG/DL (ref 0.7–1.3)
D-DIMER, QUANTITATIVE: 2500 NG/MLFEU (ref 0–622)
EOSINOPHIL AUTOMATED: 0.3 %
EOSINOPHILS: 0 (ref 0–0.6)
EST GLOMERULAR FILTRATION RATE: 33 ML/MIN
GLUCOSE: 140 MG/DL (ref 70–99)
HEMATOCRIT: 34.7 % (ref 38.6–49.2)
HEMOGLOBIN: 11 GM/DL (ref 13–17)
K (POTASSIUM, SERUM): 3.9 MMOL/L (ref 3.5–5.2)
LYMPHOCYTE AUTOMATED: 25.6 %
LYMPHOCYTES: 1.6 (ref 0.78–3.73)
MEAN CORPUSCULAR HGB: 28 PG (ref 26–34)
MEAN CORPUSCULAR VOL: 88 FL (ref 80–100)
MEAN PLATELET VOLUME: 8.7 FL (ref 6.8–10.2)
MONOCYTE AUTOMATED: 8.6 %
MONOCYTES: 0.5 (ref 0.17–1)
NA (SODIUM, SERUM): 141 MMOL/L (ref 133–144)
NEUTROPHIL ABSOLUTE: 4 (ref 1.91–7.6)
NEUTROPHIL AUTOMATED: 65.2 %
PLATELET COUNT: 243 K/MM3 (ref 150–450)
PROTEIN TOTAL: 6.4 G/DL (ref 6.4–8.9)
RED BLOOD CELL COUNT: 3.94 M/MM3 (ref 4.34–5.6)
RED CELL DISTRIBUTIO: 16.1 % (ref 11.9–15.9)
WHITE BLOOD CELL COU: 6.1 K/MM3 (ref 4–11)

## 2022-01-09 PROCEDURE — 99232 SBSQ HOSP IP/OBS MODERATE 35: CPT | Performed by: INTERNAL MEDICINE

## 2022-01-10 ENCOUNTER — OFF PREMISE (OUTPATIENT)
Dept: OTHER | Age: 77
End: 2022-01-10

## 2022-01-10 LAB
*MEAN CORPUSCULAR HGB CONC: 31.9 G/DL (ref 32.5–35.8)
ANION GAP: 6 MEQ/L (ref 6.2–14.7)
BASOPHIL AUTOMATED: 0.4 %
BASOPHILS: 0 (ref 0–0.14)
BEDSIDE GLUCOSE: 219 MG/DL (ref 70–110)
BEDSIDE GLUCOSE: 256 MG/DL (ref 70–110)
BEDSIDE GLUCOSE: 257 MG/DL (ref 70–110)
BLOOD UREA NITROGEN (BUN): 41 MG/DL (ref 7–25)
BUN/CREATININE RATIO: 19.9 (ref 7.4–23)
CALCIUM, SERUM: 9 MG/DL (ref 8.6–10.3)
CARBON DIOXIDE: 37 MEQ/L (ref 21–31)
CHLORIDE, SERUM: 98 MMOL/L (ref 98–107)
CHRONIC KIDNEY DISEASE STAGE: ABNORMAL
CREATININE: 2.06 MG/DL (ref 0.7–1.3)
EOSINOPHIL AUTOMATED: 0.5 %
EOSINOPHILS: 0 (ref 0–0.6)
EST GLOMERULAR FILTRATION RATE: 31 ML/MIN
GLUCOSE: 158 MG/DL (ref 70–99)
HEMATOCRIT: 32.6 % (ref 38.6–49.2)
HEMOGLOBIN: 10.4 GM/DL (ref 13–17)
K (POTASSIUM, SERUM): 3.4 MMOL/L (ref 3.5–5.2)
LYMPHOCYTE AUTOMATED: 25 %
LYMPHOCYTES: 1.7 (ref 0.78–3.73)
MEAN CORPUSCULAR HGB: 27.9 PG (ref 26–34)
MEAN CORPUSCULAR VOL: 87.4 FL (ref 80–100)
MEAN PLATELET VOLUME: 8.5 FL (ref 6.8–10.2)
MONOCYTE AUTOMATED: 9.3 %
MONOCYTES: 0.6 (ref 0.17–1)
NA (SODIUM, SERUM): 141 MMOL/L (ref 133–144)
NEUTROPHIL ABSOLUTE: 4.4 (ref 1.91–7.6)
NEUTROPHIL AUTOMATED: 64.8 %
PLATELET COUNT: 206 K/MM3 (ref 150–450)
RED BLOOD CELL COUNT: 3.73 M/MM3 (ref 4.34–5.6)
RED CELL DISTRIBUTIO: 15.9 % (ref 11.9–15.9)
WHITE BLOOD CELL COU: 6.9 K/MM3 (ref 4–11)

## 2022-01-10 PROCEDURE — 99232 SBSQ HOSP IP/OBS MODERATE 35: CPT | Performed by: INTERNAL MEDICINE

## 2022-01-11 ENCOUNTER — OFF PREMISE (OUTPATIENT)
Dept: OTHER | Age: 77
End: 2022-01-11

## 2022-01-11 LAB
BEDSIDE GLUCOSE: 193 MG/DL (ref 70–110)
BEDSIDE GLUCOSE: 213 MG/DL (ref 70–110)
BEDSIDE GLUCOSE: 262 MG/DL (ref 70–110)
BEDSIDE GLUCOSE: 290 MG/DL (ref 70–110)

## 2022-01-11 PROCEDURE — 99232 SBSQ HOSP IP/OBS MODERATE 35: CPT | Performed by: INTERNAL MEDICINE

## 2022-01-12 ENCOUNTER — OFF PREMISE (OUTPATIENT)
Dept: OTHER | Age: 77
End: 2022-01-12

## 2022-01-12 LAB
*MEAN CORPUSCULAR HGB CONC: 33.6 G/DL (ref 32.5–35.8)
ANION GAP: 9 MEQ/L (ref 6.2–14.7)
BASOPHIL AUTOMATED: 0.8 %
BASOPHILS: 0.1 (ref 0–0.14)
BEDSIDE GLUCOSE: 206 MG/DL (ref 70–110)
BEDSIDE GLUCOSE: 208 MG/DL (ref 70–110)
BEDSIDE GLUCOSE: 245 MG/DL (ref 70–110)
BEDSIDE GLUCOSE: 255 MG/DL (ref 70–110)
BLOOD UREA NITROGEN (BUN): 52 MG/DL (ref 7–25)
BUN/CREATININE RATIO: 25.2 (ref 7.4–23)
CALCIUM, SERUM: 9 MG/DL (ref 8.6–10.3)
CARBON DIOXIDE: 35 MEQ/L (ref 21–31)
CHLORIDE, SERUM: 97 MMOL/L (ref 98–107)
CHRONIC KIDNEY DISEASE STAGE: ABNORMAL
CREATININE: 2.06 MG/DL (ref 0.7–1.3)
EOSINOPHIL AUTOMATED: 0.8 %
EOSINOPHILS: 0.1 (ref 0–0.6)
EST GLOMERULAR FILTRATION RATE: 31 ML/MIN
GLUCOSE: 151 MG/DL (ref 70–99)
HEMATOCRIT: 32.7 % (ref 38.6–49.2)
HEMOGLOBIN: 11 GM/DL (ref 13–17)
K (POTASSIUM, SERUM): 3.1 MMOL/L (ref 3.5–5.2)
LYMPHOCYTE AUTOMATED: 20.6 %
LYMPHOCYTES: 1.6 (ref 0.78–3.73)
MEAN CORPUSCULAR HGB: 28.4 PG (ref 26–34)
MEAN CORPUSCULAR VOL: 84.7 FL (ref 80–100)
MEAN PLATELET VOLUME: 9.7 FL (ref 6.8–10.2)
MONOCYTE AUTOMATED: 9.5 %
MONOCYTES: 0.7 (ref 0.17–1)
NA (SODIUM, SERUM): 141 MMOL/L (ref 133–144)
NEUTROPHIL ABSOLUTE: 5.3 (ref 1.91–7.6)
NEUTROPHIL AUTOMATED: 68.3 %
PLATELET COUNT: 215 K/MM3 (ref 150–450)
RED BLOOD CELL COUNT: 3.86 M/MM3 (ref 4.34–5.6)
RED CELL DISTRIBUTIO: 16.1 % (ref 11.9–15.9)
WHITE BLOOD CELL COU: 7.7 K/MM3 (ref 4–11)

## 2022-01-12 PROCEDURE — 99232 SBSQ HOSP IP/OBS MODERATE 35: CPT | Performed by: INTERNAL MEDICINE

## 2022-01-13 ENCOUNTER — OFF PREMISE (OUTPATIENT)
Dept: OTHER | Age: 77
End: 2022-01-13

## 2022-01-13 LAB
ANION GAP: 10 MEQ/L (ref 6.2–14.7)
BEDSIDE GLUCOSE: 184 MG/DL (ref 70–110)
BEDSIDE GLUCOSE: 237 MG/DL (ref 70–110)
BEDSIDE GLUCOSE: 239 MG/DL (ref 70–110)
BEDSIDE GLUCOSE: 261 MG/DL (ref 70–110)
BLOOD UREA NITROGEN (BUN): 51 MG/DL (ref 7–25)
BUN/CREATININE RATIO: 25 (ref 7.4–23)
CALCIUM, SERUM: 9 MG/DL (ref 8.6–10.3)
CARBON DIOXIDE: 30 MEQ/L (ref 21–31)
CHLORIDE, SERUM: 100 MMOL/L (ref 98–107)
CHRONIC KIDNEY DISEASE STAGE: ABNORMAL
CREATININE: 2.04 MG/DL (ref 0.7–1.3)
EST GLOMERULAR FILTRATION RATE: 32 ML/MIN
GLUCOSE: 193 MG/DL (ref 70–99)
K (POTASSIUM, SERUM): 4.3 MMOL/L (ref 3.5–5.2)
NA (SODIUM, SERUM): 140 MMOL/L (ref 133–144)

## 2022-01-13 PROCEDURE — 99232 SBSQ HOSP IP/OBS MODERATE 35: CPT | Performed by: INTERNAL MEDICINE

## 2022-01-14 ENCOUNTER — OFF PREMISE (OUTPATIENT)
Dept: OTHER | Age: 77
End: 2022-01-14

## 2022-01-14 LAB
*MEAN CORPUSCULAR HGB CONC: 33.3 G/DL (ref 32.5–35.8)
ANION GAP: 9 MEQ/L (ref 6.2–14.7)
BASOPHIL AUTOMATED: 0.4 %
BASOPHILS: 0 (ref 0–0.14)
BEDSIDE GLUCOSE: 185 MG/DL (ref 70–110)
BEDSIDE GLUCOSE: 222 MG/DL (ref 70–110)
BEDSIDE GLUCOSE: 230 MG/DL (ref 70–110)
BLOOD UREA NITROGEN (BUN): 55 MG/DL (ref 7–25)
BUN/CREATININE RATIO: 25.2 (ref 7.4–23)
CALCIUM, SERUM: 9 MG/DL (ref 8.6–10.3)
CARBON DIOXIDE: 30 MEQ/L (ref 21–31)
CHLORIDE, SERUM: 99 MMOL/L (ref 98–107)
CHRONIC KIDNEY DISEASE STAGE: ABNORMAL
CREATININE: 2.18 MG/DL (ref 0.7–1.3)
EOSINOPHIL AUTOMATED: 0.1 %
EOSINOPHILS: 0 (ref 0–0.6)
EST GLOMERULAR FILTRATION RATE: 29 ML/MIN
GLUCOSE: 195 MG/DL (ref 70–99)
HEMATOCRIT: 32.6 % (ref 38.6–49.2)
HEMOGLOBIN: 10.9 GM/DL (ref 13–17)
K (POTASSIUM, SERUM): 4.4 MMOL/L (ref 3.5–5.2)
LYMPHOCYTE AUTOMATED: 12 %
LYMPHOCYTES: 1.2 (ref 0.78–3.73)
MEAN CORPUSCULAR HGB: 28.4 PG (ref 26–34)
MEAN CORPUSCULAR VOL: 85.3 FL (ref 80–100)
MEAN PLATELET VOLUME: 9.3 FL (ref 6.8–10.2)
MONOCYTE AUTOMATED: 6.6 %
MONOCYTES: 0.7 (ref 0.17–1)
NA (SODIUM, SERUM): 138 MMOL/L (ref 133–144)
NEUTROPHIL ABSOLUTE: 8.1 (ref 1.91–7.6)
NEUTROPHIL AUTOMATED: 80.9 %
PLATELET COUNT: 244 K/MM3 (ref 150–450)
RED BLOOD CELL COUNT: 3.83 M/MM3 (ref 4.34–5.6)
RED CELL DISTRIBUTIO: 15.9 % (ref 11.9–15.9)
WHITE BLOOD CELL COU: 10 K/MM3 (ref 4–11)

## 2022-01-14 PROCEDURE — 99232 SBSQ HOSP IP/OBS MODERATE 35: CPT | Performed by: INTERNAL MEDICINE

## 2022-01-15 LAB
ANION GAP: 7 MEQ/L (ref 6.2–14.7)
BEDSIDE GLUCOSE: 187 MG/DL (ref 70–110)
BEDSIDE GLUCOSE: 195 MG/DL (ref 70–110)
BEDSIDE GLUCOSE: 231 MG/DL (ref 70–110)
BEDSIDE GLUCOSE: 263 MG/DL (ref 70–110)
BLOOD UREA NITROGEN (BUN): 56 MG/DL (ref 7–25)
BUN/CREATININE RATIO: 27.9 (ref 7.4–23)
CALCIUM, SERUM: 9 MG/DL (ref 8.6–10.3)
CARBON DIOXIDE: 29 MEQ/L (ref 21–31)
CHLORIDE, SERUM: 101 MMOL/L (ref 98–107)
CHRONIC KIDNEY DISEASE STAGE: ABNORMAL
CREATININE: 2.01 MG/DL (ref 0.7–1.3)
EST GLOMERULAR FILTRATION RATE: 32 ML/MIN
GLUCOSE: 215 MG/DL (ref 70–99)
K (POTASSIUM, SERUM): 4.6 MMOL/L (ref 3.5–5.2)
NA (SODIUM, SERUM): 137 MMOL/L (ref 133–144)

## 2022-01-15 PROCEDURE — 99232 SBSQ HOSP IP/OBS MODERATE 35: CPT | Performed by: INTERNAL MEDICINE

## 2022-01-16 LAB
APPEARANCE: ABNORMAL
ASCORBIC ACID COMMENT, URINE: NORMAL
BACTERIA: ABNORMAL /HPF
BEDSIDE GLUCOSE: 135 MG/DL (ref 70–110)
BEDSIDE GLUCOSE: 178 MG/DL (ref 70–110)
BEDSIDE GLUCOSE: 185 MG/DL (ref 70–110)
BEDSIDE GLUCOSE: 227 MG/DL (ref 70–110)
BEDSIDE GLUCOSE: 237 MG/DL (ref 70–110)
BILIRUBIN: ABNORMAL
COLOR: YELLOW
CULTURE REFLEX COMMENT: YES
GLUCOSE, URINE, AUTOMATED: ABNORMAL MG/DL
KETONES, URINE, AUTOMATED: ABNORMAL MG/DL
LEUKOCYTE, URINE, AUTOMATED: ABNORMAL
NITRITE, URINE AUTOMATED: NEGATIVE
PH, URINE: 9 (ref 5–8)
PROTEIN, URINE: 30 MG/DL
RBC: ABNORMAL /HPF (ref 0–2)
SPECIFIC GRAVITY UA: 1.02 (ref 1–1.03)
URINE, BLOOD, AUTOMATED: ABNORMAL
UROBILINOGEN, URINE, AUTOMATED: ABNORMAL MG/DL
WBC: ABNORMAL /HPF (ref 0–5)

## 2022-01-16 PROCEDURE — 99232 SBSQ HOSP IP/OBS MODERATE 35: CPT | Performed by: INTERNAL MEDICINE

## 2022-01-17 ENCOUNTER — OFF PREMISE (OUTPATIENT)
Dept: NEPHROLOGY | Age: 77
End: 2022-01-17

## 2022-01-17 LAB
BEDSIDE GLUCOSE: 132 MG/DL (ref 70–110)
BEDSIDE GLUCOSE: 137 MG/DL (ref 70–110)
BEDSIDE GLUCOSE: 188 MG/DL (ref 70–110)
BEDSIDE GLUCOSE: 230 MG/DL (ref 70–110)

## 2022-01-17 PROCEDURE — 99232 SBSQ HOSP IP/OBS MODERATE 35: CPT | Performed by: INTERNAL MEDICINE

## 2022-01-18 LAB
AMOXACILLIN/CLAVULANATE: ABNORMAL
AMPICILLIN/SULBACTAM: ABNORMAL
CEFAZOLIN: <=2
CEFEPIME: <=2
CEFOXITIN: <=8
CEFTRIAXONE: <=1
CEFUROXIME: <=4
CIPROFLOXACIN: >2
GENTAMICIN: <=1
LEVOFLOXACIN: 4
TOBRAMYCIN: 2
TRIMETHOPRIM/SULFAMETHOXAZOLE: ABNORMAL
URINE CULTURE: NORMAL
URINE CULTURE: NORMAL

## 2022-01-20 ENCOUNTER — NURSING HOME VISIT (OUTPATIENT)
Dept: NEPHROLOGY | Age: 77
End: 2022-01-20

## 2022-01-20 VITALS
RESPIRATION RATE: 18 BRPM | BODY MASS INDEX: 32.57 KG/M2 | SYSTOLIC BLOOD PRESSURE: 131 MMHG | WEIGHT: 281.8 LBS | DIASTOLIC BLOOD PRESSURE: 74 MMHG | HEART RATE: 66 BPM | OXYGEN SATURATION: 96 % | TEMPERATURE: 97.3 F

## 2022-01-20 DIAGNOSIS — R60.0 EDEMA OF BOTH LEGS: ICD-10-CM

## 2022-01-20 DIAGNOSIS — U07.1 COVID-19 VIRUS INFECTION: ICD-10-CM

## 2022-01-20 DIAGNOSIS — N18.32 STAGE 3B CHRONIC KIDNEY DISEASE (CMD): Primary | ICD-10-CM

## 2022-01-20 PROCEDURE — 99309 SBSQ NF CARE MODERATE MDM 30: CPT | Performed by: NURSE PRACTITIONER

## 2022-03-21 ENCOUNTER — TELEPHONE (OUTPATIENT)
Dept: FAMILY MEDICINE CLINIC | Facility: CLINIC | Age: 77
End: 2022-03-21

## 2022-03-22 NOTE — TELEPHONE ENCOUNTER
Patients son, Robb Esquivel, LVM yesterday to inform , his father has passed away over the weekend. He said  was his father and family favorite. \"Thank you and your staff for the great care over the years. \"

## 2022-06-07 NOTE — PROGRESS NOTES
LVM for pt to call office for questions on last colonoscopy [All Other ROS] : all other reviewed systems are negative

## 2023-04-11 NOTE — TELEPHONE ENCOUNTER
Med check scheduled for 10/4. A-T Advancement Flap Text: The defect edges were debeveled with a #15 scalpel blade.  Given the location of the defect, shape of the defect and the proximity to free margins an A-T advancement flap was deemed most appropriate.  Using a sterile surgical marker, an appropriate advancement flap was drawn incorporating the defect and placing the expected incisions within the relaxed skin tension lines where possible.    The area thus outlined was incised deep to adipose tissue with a #15 scalpel blade.  The skin margins were undermined to an appropriate distance in all directions utilizing iris scissors.

## (undated) DEVICE — CADIERE FORCEPS: Brand: ENDOWRIST;DAVINCI SI

## (undated) DEVICE — GAUZE SPONGES,USP TYPE VII GAUZE, 12 PLY: Brand: CURITY

## (undated) DEVICE — CHLORAPREP 26ML APPLICATOR

## (undated) DEVICE — SOL  .9 1000ML BTL

## (undated) DEVICE — SCD SLEEVE KNEE HI BLEND

## (undated) DEVICE — SUTURE VICRYL 3-0 SH

## (undated) DEVICE — SUTURE MONOCRYL 4-0 PS-2

## (undated) DEVICE — ELECTRO LUBE IS A SINGLE PATIENT USE DEVICE THAT IS INTENDED TO BE USED ON ELECTROSURGICAL ELECTRODES TO REDUCE STICKING.: Brand: KEY SURGICAL ELECTRO LUBE

## (undated) DEVICE — SPECIMEN CONTAINER,POSITIVE SEAL INDICATOR, OR PACKAGED: Brand: PRECISION

## (undated) DEVICE — SUTURE ETHILON 3-0 FSL

## (undated) DEVICE — MONOPOLAR CURVED SCISSORS: Brand: ENDOWRIST;DAVINCI SI

## (undated) DEVICE — SUTURE VICRYL 0

## (undated) DEVICE — 3M(TM) TEGADERM(TM) TRANSPARENT FILM DRESSING FRAME STYLE 9505W: Brand: 3M™ TEGADERM™

## (undated) DEVICE — SUTURE VLOC 180 0 12\" 0316

## (undated) DEVICE — TIP COVER ACCESSORY

## (undated) DEVICE — STANDARD HYPODERMIC NEEDLE,POLYPROPYLENE HUB: Brand: MONOJECT

## (undated) DEVICE — REM POLYHESIVE ADULT PATIENT RETURN ELECTRODE: Brand: VALLEYLAB

## (undated) DEVICE — ENDOPATH XCEL BLADELESS TROCARS WITH STABILITY SLEEVES: Brand: ENDOPATH XCEL

## (undated) DEVICE — LOWER EXTREMITY CDS-LF: Brand: MEDLINE INDUSTRIES, INC.

## (undated) DEVICE — LARGE NEEDLE DRIVER: Brand: ENDOWRIST;DAVINCI SI

## (undated) DEVICE — C-ARM: Brand: UNBRANDED

## (undated) DEVICE — LAP CHOLE/APPY CDS-LF: Brand: MEDLINE INDUSTRIES, INC.

## (undated) DEVICE — STOCKINETTE HYDROMED 6\" 706044

## (undated) DEVICE — SOL H2O 1000ML BTL

## (undated) DEVICE — GLOVE ORTHO ALOETOUCH SZ 8-1/2

## (undated) DEVICE — BLADE SAW KM33-11

## (undated) DEVICE — GOWN,SIRUS,FABRIC-REINFORCED,X-LARGE: Brand: MEDLINE

## (undated) DEVICE — 40580 - THE PINK PAD - ADVANCED TRENDELENBURG POSITIONING KIT: Brand: 40580 - THE PINK PAD - ADVANCED TRENDELENBURG POSITIONING KIT

## (undated) DEVICE — DRAPE WARMER ORS-300

## (undated) DEVICE — PREMIUM WET SKIN PREP TRAY: Brand: MEDLINE INDUSTRIES, INC.

## (undated) DEVICE — COVER,MAYO STAND,STERILE: Brand: MEDLINE

## (undated) DEVICE — STRETCH BANDAGE ROLL: Brand: DERMACEA

## (undated) DEVICE — Device

## (undated) DEVICE — SYRINGE 20CC LL TIP

## (undated) DEVICE — NON-ADHERENT STRIPS,OIL EMULSION: Brand: CURITY

## (undated) DEVICE — PAD SACRAL SPAN AID

## (undated) DEVICE — VIOLET BRAIDED (POLYGLACTIN 910), SYNTHETIC ABSORBABLE SUTURE: Brand: COATED VICRYL

## (undated) DEVICE — DV OBTURATOR BLADELESS 8MM

## (undated) DEVICE — DV KIT ACCESSORY 3-ARM DISP

## (undated) DEVICE — STERILE POLYISOPRENE POWDER-FREE SURGICAL GLOVES: Brand: PROTEXIS

## (undated) NOTE — Clinical Note
January 16, 2017    [place] [address] [place] [id number]      Dear Doug Burns: It was a pleasure speaking with you over the phone recently.  I wanted to send you my contact information for you to utilize when you have a que

## (undated) NOTE — ED AVS SNAPSHOT
[facility] Emergency Department    [place]  [address] [address]    Phone:  [phone]    Fax:  [address] [place] [address] Kristen Jaquez   MRN: [id number]    Department:  [facility] Emergency Department   Date of Visit:  4/4 have any questions regarding your home medications, including potential side effects.               Medication List      START taking these medications     Doxycycline Hyclate 100 MG Caps   Quantity:  20 capsule   Commonly known as:  VIBRAMYCIN   Take 1 cap a substitute for ongoing medical care. Often, one Emergency Department visit does not uncover every injury or illness.  If you have been referred to a primary care or a specialist physician for a follow-up visit, please tell this physician (or your personal Sudhir Palacios ([facility]) [phone] [address] ([address]) [phone]                Additional Information       We are concerned for your overall well being:    - If you are a smoker or have smoked in the las

## (undated) NOTE — LETTER
7/31/2017    Lawrence Varner  [address] [place] [phone]    Dear Mr. Sheron Riedel office has been trying to contact you to discuss your recent test results or you are due for an appointment with your provider.   It is important th

## (undated) NOTE — MR AVS SNAPSHOT
[facility], [address] [address] [phone]               Thank you for choosing us for your health care visit with [address] [place] [address], .   We are glad to serve you and happy to provide you with this s capsule (300 mg total) on day 1, 2 times day on day 2 (600 mg total), then 3 times a day there after.    Commonly known as:  NEURONTIN           GlipiZIDE ER 5 MG Tb24   TAKE ONE TABLET BY MOUTH ONCE DAILY   Commonly known as:  GLIPIZIDE XL           Glucos Take 0.4 mg by mouth daily. Commonly known as:  FLOMAX           torsemide 20 MG Tabs   Take 1 tablet (20 mg total) by mouth 2 (two) times daily.    Commonly known as:  DEMADEX                Where to Get Your Medications      These medications were sent ? Make sure carpets and area rugs have skid proof backing. ? Do not use slippery wax on bare floors. ? Keep furniture in its accustomed place. ? If you have pets, be careful that you don’t trip over them. OUTSIDE SAFETY TIPS  ?  Always wear good shoe

## (undated) NOTE — LETTER
January 6, 2022    Jony Read  [address] [place] [phone]    Dear Radha Jolley: It was a pleasure speaking with your wife Danika Matute over the phone recently.  To follow up, I wanted to send you some contact information to utilize when you h

## (undated) NOTE — ED AVS SNAPSHOT
[facility] Emergency Department    [place]  [address] [address]    Phone:  [phone]    Fax:  [phone]           Vanga Ellis   MRN: [id number]    Department:  [facility] Emergency Department   Date of Visit:  3/2 IF THERE IS ANY CHANGE OR WORSENING OF YOUR CONDITION, CALL YOUR PRIMARY CARE PHYSICIAN AT ONCE OR RETURN IMMEDIATELY TO THE EMERGENCY DEPARTMENT.     If you have been prescribed any medication(s), please fill your prescription right away and begin taking t

## (undated) NOTE — ED AVS SNAPSHOT
[facility] Emergency Department    [place]  [address] [address]    Phone:  [phone]    Fax:  Sam Rich   MRN: [id number]    Department:  [facility] Emergency Department   Date of Visit:  4/5 IF THERE IS ANY CHANGE OR WORSENING OF YOUR CONDITION, CALL YOUR PRIMARY CARE PHYSICIAN AT ONCE OR RETURN IMMEDIATELY TO THE EMERGENCY DEPARTMENT.     If you have been prescribed any medication(s), please fill your prescription right away and begin taking t

## (undated) NOTE — IP AVS SNAPSHOT
Patient Demographics     Address  [address] Phone  [phone] [facility])  [phone] (Mobile) *Preferred* E-mail Address  [email or web address]      Emergency Contact(s)     Name Relation Home Work Mobile    Flavia Madden Spouse weekly once by Dr. Marcello Yousif and once at the rehab center a dry gauze dressing should be applied with a piece of 1 inch tape over the gauze that covers the incision site and an Ace wrap for mild compression.   3.  The patient may do 15-20 steps per day with t dose due: 4/28 morning       Take 1 capsule (500 mg total) by mouth 2 (two) times daily for 7 days. Stop taking on:  May 3, 2021   Aniya Tatum MD         clonazePAM 2 MG Tabs  Commonly known as: Adali Naeem  Next dose due: 4/27 evening       Take 1 tab Toprol XL  Next dose due: 4/28 morning       Take 1 tablet (50 mg total) by mouth once daily. Nely Gan MD         simvastatin 40 MG Tabs  Commonly known as: ZOCOR  Next dose due: 4/27 evening       Take 1 tablet (40 mg total) by mouth nightly.    Damon Hector 0840 Given      [id number] aspirin EC tab 81 mg 04/27/21 0840 Given      [id number] atorvastatin (LIPITOR) tab 20 mg 04/26/21 2143 Given      [id number] clonazePAM (KLONOPIN) tab 1 mg 04/26/21 1830 Given      [id number] clonazePAM (KLONOPIN) tab 1 mg 04/26/21 [id number] Heparin Sodium (Porcine) 5000 UNIT/ML injection 5,000 Units 04/27/21 0541 Given            LEFT UPPER ARM     Order ID Medication Name Action Time Action Reason Comments    [id number] Insulin Aspart Pen (NOVOLOG) 100 UNIT/ML flexpen 1-68 Units 04/ Cirilo Dominique [facility])   Creatinine 2.51 0.70 - 1.30 mg/dL [facility])   BUN/CREA Ratio 15.1 10.0 - 20.0 — [facility])   Calcium, Total 8.7 8.5 - 10.1 mg/dL [facility])   Calculated Osmolality 293 275 - 295 mOsm/kg — [facility])   GFR, Specimen: Tissue from Toe      Tissue Culture Result One colony Staphylococcus species, not aureus     Tissue Smear 1+ WBCs seen      1+ Gram Positive Rods      1+ Gram positive cocci in pairs    Rapid SARS-CoV-2 by PCR STAT [[id number]]  (Normal) Collecte aneurysm status post stent graft history of CAD peripheral vascular disease prostate cancer hypertension who presents with cellulitis of the left lower extremity. Patient has poor hygiene of both feet with fungal toenails infections.   He has been having l Hypercholesterolemia    • Hyperlipidemia    • Hypokalemia 9/2007   • Leukocytosis 11/09/2007   • [facility] 3/26/2012    pt does not want radiation   • Metabolic syndrome    • Myocardial infarct ([facility] 9/07    w/3 vessel coronary a History:[OO.1]   Family History   Problem Relation Age of Onset   • Heart Attack Father    • Other (bone cancer) Mother[OO.2]    Unknown[OO. 3]    Allergies:[OO.1]   Ace Inhibitors          Coughing[OO. 2]    Medications:[OO.1]  No current facility-administe 108 (90 Base) MCG/ACT Inhalation Aero Soln, INHALE 2 PUFFS INTO THE LUNGS EVERY 4 (FOUR) HOURS AS NEEDED FOR WHEEZING OR SHORTNESS OF BREATH., Disp: 20.1 Inhaler, Rfl: 5  losartan 100 MG Oral Tab, Take 1 tablet (100 mg total) by mouth daily. , Disp: 90 tabl cyanosis. Integument: No rashes or lesions. Psychiatric: Appropriate mood and affect.       Diagnostic Data:      Labs:[OO.1]  Recent Labs   Lab 04/14/21  1403   WBC 9.2   HGB 11.8*   MCV 92.0   .0       Recent Labs   Lab 04/14/21  1403   * 4/14/2021  4:29 PM  OO. 2 - Elsa Resendiz MD on 4/14/2021  4:30 PM  OO. 3 - Elsa Resendiz MD on 4/14/2021  5:35 PM                        Consults - MD Consult Notes      Consults signed by Rodolfo Richardson MD at 4/26/2021  2:15 PM     Author: Walt Echeverria (chronic obstructive pulmonary disease) (HCC)     no O2   • Coronary atherosclerosis of unspecified type of vessel, native or graft 3/26/2012   • Coronary disease     s/p LAD stent   • Depression    • Disorder of thyroid    • Dysuria 11/5/07    w/burning coronary angiogram,left iliofemoral angiogram   • CATH BARE METAL STENT (BMS)     • HEMORRHOIDECTOMY  9/6/07   • OTHER      AAA repair   • OTHER SURGICAL HISTORY  9/07    patent right coronary artery stent   • OTHER SURGICAL HISTORY      rt cataract   • TH Metoprolol Succinate ER (Toprol XL) 24 hr tab 50 mg, 50 mg, Oral, Daily  •  atorvastatin (LIPITOR) tab 20 mg, 20 mg, Oral, Nightly  •  tamsulosin HCl (FLOMAX) cap 0.8 mg, 0.8 mg, Oral, Daily  •  torsemide (DEMADEX) tab 60 mg, 60 mg, Oral, Daily  •  glucose Wheezing., Disp: , Rfl:   Budesonide-Formoterol Fumarate 160-4.5 MCG/ACT Inhalation Aerosol, Inhale 2 puffs into the lungs 2 (two) times daily. , Disp: , Rfl:   glipiZIDE 10 MG Oral Tab, Take 10 mg by mouth daily.   , Disp: , Rfl:   Potassium Chloride ER 20 systems was completed. Pertinent positives and negatives noted in the the HPI. Physical Exam:    General: No acute distress. Alert and oriented x 3.   Vital signs:[BANDAR.1] Temp:  [98 °F (36.7 °C)-98.8 °F (37.1 °C)] 98.5 °F (36.9 °C)  Pulse:  [68-93] 74 Established Problem list:[BANDAR.1]  Patient Active Problem List:     Malignant neoplasm of prostate ([facility]     Essential hypertension, benign     DM type 2 (diabetes mellitus, type 2) ([facility]     Coronary atherosclerosis     BRITNEY on CPAP     Myocardial infarct ( left foot ([address]     PAD (peripheral artery disease) (HCC)[BANDAR.2]      ASSESSMENT/PLAN:  1. SP amputation left 2nd toe partial  -can switch to PO abx for dc  Culture noted coag negative staph, unclear significance            [facility].     4/23/21  PREOPERATIVE DIAGNOSIS:  Osteomyelitis of the left foot at the level of the distal interphalangeal joint. POSTOPERATIVE DIAGNOSIS:  Osteomyelitis of the left foot at the level of the distal interphalangeal joint.   Allen Roy ct small stone   • Heart attack [facility])    • Hemorrhoids    • High blood pressure    • High cholesterol    • History of noncompliance with medical treatment 9/07   • Hypercholesterolemia    • Hyperlipidemia    • Hypokalemia 9/2007   • Leukocytosis 11/09/2007 lower extremity           L Lower Extremity: Partial Weight Bearing 50%[BR.2]    PAIN ASSESSMENT[BR.1]   Ratin  Location: L foot  Management Techniques:  Activity promotion;Repositioning[BR.2]    BALANCE[BR.1] difficulty wb, recommended heel wb. Pt amb additional 10' with RW and min assist.   Pt required rest in between each exercise and mobility due to fatigue. Tolerance better if rest provided otherwise pt tends to refuse to progress with activity. [BR.3]     T throughout tx: mask, gloves, and goggles. Pt unmasked in the room.    [BR.1]       Attribution De Leon    BR. 1 - Gina Lala, PTA on 4/26/2021 10:32 AM  BR. 2 - Gina Lala, PTA on 4/26/2021  3:42 PM  BR. 3 - Kaley Lala, PTA on 4/26/2021

## (undated) NOTE — IP AVS SNAPSHOT
[facility] [place] [address] [place], [address] ~ [phone]                Discharge Summary   3/9/2017    Milo Oropeza           Admission Information        Provider Department    3/9/2017 Nya Simmons MD  3nw-A         T Kirsten Carnes                           SYMBICORT 160-4.5 MCG/ACT Aero   Generic drug:  Budesonide-Formoterol Fumarate   What changed:  See the new instructions.         INHALE TWO PUFFS BY MOUTH TWICE DAILY    Radha Gould 1-2 sprays each nostril every 12 hours PRN    Mathew Abarca                           Levothyroxine Sodium 200 MCG Tabs   Last time this was given:  200 mcg on 3/15/2017  5:52 AM   Commonly known as:  SYNTHROID        Take 1 tablet (200 mcg total) b Morning Afternoon Evening As Needed    ECOTRIN 325 MG Tbec   Generic drug:  aspirin        Take 325 mg by mouth daily.                                  Where to Get Your Medications      These medications were sent to [facility], I hernia. Bruising may also occur in the groin and testicle. Any progressive redness, pain or drainage should be reported after the first 2 days.     If you have any questions or problems such as fever, persistent nausea and bowel problems, uncontrolled salomon Metabolic Lab Results  (Last result in the past 90 days)    HgbA1C Glucose BUN Creatinine Calcium Alkaline Phosph AST    (12/26/16)  6.9 (H) (03/14/17)  148 (H) (03/14/17)  53 (H) (03/14/17)  2.01 (H) (03/14/17)  9.4 (03/12/17)  72 (03/12/17)  11 (L) __    Medication Side Effects - Medications to be taken at home  As your caregivers, we want you to be aware of the medications you are prescribed to take and their potential SIDE EFFECTS.  Your nurse will review your medications with you before you are dis What to report to your healthcare team:  Dizziness, muscle aches, constipation           Salicylates     Salicylates    aspirin (ECOTRIN) 325 MG Oral Tab EC         Use:  “Thinning” of blood to prevent clotting within blood vessels, Pain relief   Most comm Endocrine Medications     Levothyroxine Sodium (SYNTHROID) 200 MCG Oral Tab         Use: Regulate metabolism and inflammation   Most common side effects:  Dizziness, swelling, appetite changes, weight changes, changes in sleep and alertness, palpitations

## (undated) NOTE — LETTER
November 8, 2021    [address] [place] [id number]    Dear Araceli Hernandez: It was a pleasure speaking with you over the phone recently.  To follow up, I wanted to send you some contact information to utilize when you have a que

## (undated) NOTE — IP AVS SNAPSHOT
[address]            (For Outpatient Use Only) Initial Admit Date: 4/14/2021   Inpt/Obs Admit Date: Inpt: 4/16/21 / Obs: 04/14/21   Discharge Date:    Aurora Colon:  [de-identified]   MRN: [de-identified]   CSN: [id number]   CEID: [id number] Number:  Insurance Type:    Subscriber Name:  Subscriber :    Subscriber ID:  Pt Rel to Subscriber:    Hospital Account Financial Class: Medicare    2021

## (undated) NOTE — MR AVS SNAPSHOT
[facility], [address] [address] [phone]               Thank you for choosing us for your health care visit with [address] [place] [address], .   We are glad to serve you and happy to provide you with this s location:  · Inguinal: This type is in the groin or scrotum. It is more common in men. · Femoral: This type is in the groin, upper thigh (where the leg bends), or labia. It is more common in women. · Ventral: This type is in the abdominal wall.   · Radah Swanson abdominal muscles and tissues. Follow-up care  Follow up with your healthcare provider, or as directed. If imaging tests were done, they will be reviewed a doctor. You will be told the results and any new findings that may affect your care.   When to seek time. Some common symptoms include:  · Bulge in the groin area, around the navel, or in the scrotum (the bulge may get bigger when you stand and go away when you lie down)  · Pain or pressure around the bulge  · Pain during activities such as lifting, coug · Avoid heavy lifting and straining as directed. · Take steps to prevent constipation, such as eating more fiber and drinking more water. This may help reduce straining that can occur when having a bowel movement.  Reducing straining may help keep your sym Today's Vital Signs     BP Pulse Temp Height Weight BMI    150/80 mmHg 74 97.4 °F (36.3 °C) (Oral) 75\" 314 lb 39.25 kg/m2         Current Medications          This list is accurate as of: 2/16/17  1:17 PM.  Always use your most recent med list. Metoprolol Succinate ER 50 MG Tb24   TAKE ONE TABLET BY MOUTH ONCE DAILY   Commonly known as:   Toprol XL           Potassium Chloride ER 20 MEQ Tbcr   Take 2 tablets daily   Commonly known as:  KLOR-CON M20           simvastatin 40 MG Tabs   Take 1 tab physician's office. At that time, you will be provided with any authorization numbers or be assured that none are required. You can then schedule your appointment.  Failure to obtain required authorization numbers can create reimbursement difficulties for y

## (undated) NOTE — ED AVS SNAPSHOT
[facility] Emergency Department    [place]  [address] [address]    Phone:  [phone]    Fax:  [address]   MRN: [id number]    Department:  [facility] Emergency Department   Date of Visit:  4/5 Follow the directions for taking your medications provided by your doctor. Please ask your health care provider, pharmacist or nurse if you have any questions regarding your home medications, including potential side effects.               Medication List to a primary care or a specialist physician for a follow-up visit, please tell this physician (or your personal doctor if your instructions are to return to your personal doctor) about any new or lasting problems.  The primary care or specialist physician w We are concerned for your overall well being:    - If you are a smoker or have smoked in the last 12 months, we encourage you to explore options for quitting.     - If you have concerns related to behavioral health issues or thoughts of harming yourself,

## (undated) NOTE — ED AVS SNAPSHOT
[facility] Emergency Department    [place]  [address] [address]    Phone:  [phone]    Fax:  [address]   MRN: [id number]    Department:  [facility] Emergency Department   Date of Visit:  4/6 1 mg                     Medication Information       Follow the directions for taking your medications provided by your doctor.  Please ask your health care provider, pharmacist or nurse if you have any questions regarding your home medications, including self-assessment the day after your visit. You may also receive a call from our patient liason soon after your visit. Also, some patients receive a detailed feedback survey mailed to them a week after the visit.   If you receive this, we would really apprec [address] [phone] [address] (68 [facility] [id number] ([address]) [address] [place] [address]

## (undated) NOTE — IP AVS SNAPSHOT
Patient Demographics     Address Phone E-mail Address    [address] [id number] [facility]) *Preferred*  [phone] (Mobile) [email or web address]      Emergency Contact(s)     Name Relation Home Work Mobile    Tammy Madden progressive redness, pain or drainage should be reported after the first 2 days. If you have any questions or problems such as fever, persistent nausea and bowel problems, uncontrolled pain or poor appetite; you should contact the office.   Contact the o Commonly known as:  CATAPRES        Take 1 tablet (0.1 mg total) by mouth 2 (two) times daily. Toi Pitts                              DULOXETINE HCL OR   Last time this was given:  60 mg on 3/15/2017  9:03 AM        Take 60 mg by mouth daily. TAKE ONE TABLET BY MOUTH TWICE DAILY WITH MEALS    Keshia Abarca                              Metoprolol Succinate ER 50 MG Tb24   Last time this was given:  50 mg on 3/15/2017  5:52 AM   Commonly known as:   Toprol XL        TAKE ONE TABLET BY MOUTH Please  your prescriptions at the location directed by your doctor or nurse     Bring a paper prescription for each of these medications    - HYDROcodone-acetaminophen 5-325 MG Tabs            [id number] - MAR ACTION REPORT  (last 24 hrs)    ** SITE [id number] [facility]) chewable tab 008 mg 03/15/17 0903 Given      [id number] simethicone (MYLICON) chewable tab 810 mg 03/15/17 1713 Given      [id number] spironolactone (ALDACTONE) tab 50 mg 03/15/17 0903 Given      [id number] torsemide (DEMADEX) Value Reference Range Flag Lab   Potassium 4.1 3.6-5.1 mmol/L  [facility]            Testing Performed By     Lab - Abbreviation Name Director Address Valid Date Range    [facility] Korey Hector  S.  [address] [facility] fighting the machine. He has had multiple surgeries in the past.  Also, he has taken Norco after surgery in the past, but there were no similar events. His last dose of Norco was 3 p.m. yesterday and since then he has not received any pain medications. and 7 grandchildren and knew all their names, but he did not know what year it was and he did not know where he was, but when I gave him a choice he made the right choice. He follows simple commands. There is no apparent facial weakness.   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 Pt is able to describe his home setting and reports that he lives with wife who has a \"bad leg\" and \"confusion at times\" and pt's disabled son.  Pt somewhat confused with current setting , stating he was made to sleep \"on a steel bed on [place] PT Treatment Plan: Bed mobility; Body mechanics; Endurance; Patient education; Energy conservation; Family education;Gait training;Strengthening;Stoop training;Stair training;Transfer training;Balance training  Rehab Potential : Good  Frequency (Obs): 5x/week :  Georgi Johnston PTA (Physical Therapy Assistant)            PHYSICAL THERAPY TREATMENT NOTE - INPATIENT    Room Number: 253/699-E     Session: 1   Number of Visits to Meet Established Goals: 5    Presenting Problem: ventral hernia repair 3/9 • History of noncompliance with medical treatment 9/07   • 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 CGA stand to sit. Pt left in chair, needs met. Patient End of Session: Up in chair;Needs met;Call light within reach;RN aware of session/findings; All patient questions and concerns Yady Courtney Occupational Therapy Note by Sasha Thompson at 3/14/2017  2:30 PM  Version 1 of 1    Author:  Sasha Thompson Service:  (none) Author Type:  Occupational Therapist    Filed:  3/14/2017  2:31 PM Note Time:  3/14/2017  2:30 PM Status:  Signed    :  Govind Gao

## (undated) NOTE — LETTER
5/3/2018    Bonnie Stratton    Dear  Mary Yasmin office has been trying to contact you to discuss your recent test results. It is important that we reach you to discuss these results.   Please contact our off

## (undated) NOTE — MR AVS SNAPSHOT
[facility] 37, [address] [phone]               Thank you for choosing us for your health care visit with [address] [place] [address], .   We are glad to serve you and happy to provide you with this s requirements for authorization, please wait 5-7 days and then contact your physician's office. At that time, you will be provided with any authorization numbers or be assured that none are required. You can then schedule your appointment.  Failure to obtain beta-2 agonist to help keep airways open. My medicines: __________________________________________  When to take: __________________________________________     Anticholinergics. Maintenance: There are long-acting anticholinergenics.   My medicines: ______ When to take: __________________________________________  Herbal products and supplements  There are products for COPD that are available without a prescription. For example, herbs, extracts, or supplements.  Be sure to talk with your healthcare provider be * Levothyroxine Sodium 200 MCG Tabs   Take 1 tablet (200 mcg total) by mouth before breakfast.   What changed:  Another medication with the same name was added. Make sure you understand how and when to take each.    Commonly known as:  SYNTHROID [address] [phone],  [facility], [address] [place] [address]     Phone:  [phone]    - Ipratropium Bromide 0.03 % Soln  - Levothyroxine Sodium 200 MCG Tabs  - Levothyroxine Sodium 25 MCG Tabs      Information about where to

## (undated) NOTE — LETTER
Jovanna Grossman 182  295 [facility] S, [address] [facility]  Authorization for Surgical Operation and Procedure     Date:___________                                                                                                         Time:__________ can occur: fever and allergic reactions, hemolytic reactions, transmission of diseases such as Hepatitis, AIDS and Cytomegalovirus (CMV) and fluid overload.   In the event that I wish to have an autologous transfusion of my own blood, or a directed donor tr the applicable recovery period ends for purposes of reinstating the DNAR order.   10. Patients having a sterilization procedure: I understand that if the procedure is successful the results will be permanent and it will therefore be impossible for me to ins give me medicine and do additional procedures as necessary. Some examples are: Starting or using an “IV” to give me medicine, fluids or blood during my procedure, and having a breathing tube placed to help me breathe when I’m asleep (intubation).  In the ev rare but potential complications include headache, bleeding, infection, seizure, irregular heart rhythms, and nerve injury.     I can change my mind about having anesthesia services at any time before I get the medicine.    _________________________________

## (undated) NOTE — LETTER
[facility] [address], [address]  Consent for Procedure/Sedation    Date: 06/03/2018    Time: 0745      1.  I authorize the performance upon Francisco Javier Dow the following:cardiac catheterization, left ventricular cineangiography, determine when the applicable recovery period ends for purposes of reinstating the Do Not Resuscitate (DNR) order.     Signature of Patient: ____________________________________________________    Signature of person authorized

## (undated) NOTE — LETTER
8/16/2017    Vanessa Stephen  [address] [place] [phone]    Dear Krystin Carmela Query office has been trying to contact you to schedul an appointment with your provider.   It is important that we reach you to discuss your healthcare n

## (undated) NOTE — LETTER
[facility] [address], [address]  Consent for Procedure/Sedation    Date: 04/21/21    Time: 1500      1.  I authorize the performance upon Milagro Estrella the following: Peripheral angiography, atherectomy, percutaneous translumi Relationship to  to consent for patient: ___________________________   patient: ___________________    Witness: ______________________________________  Date: _____________________    Patient Name: Za Hernandez YOB: 1945

## (undated) NOTE — MR AVS SNAPSHOT
[facility], [address] [address] [phone]               Thank you for choosing us for your health care visit with [address] [place] [address], .   We are glad to serve you and happy to provide you with this s instructed by your healthcare provider. Some are rescue medicines. Take these only when you have symptoms like increased shortness of breath or chest tightness.  Take this sheet with you to your next office visit and ask your healthcare provider to help you Oral corticosteroids. These medicines are taken by mouth. They may be used when symptoms worsen.   My medicines: __________________________________________  When to take: __________________________________________     Phosphodiesterase type 4 (PDE4) inhibit Try not to withdraw from family and friends, even if you are finding it hard to talk to them. They can still be a good source of support. To feel better, you can also:  · Spend time doing things you enjoy.  This may include participating in a favorite hobby Heart Failure: Medicines to Help Your Heart    You have a condition called heart failure (also known as congestive heart failure, or CHF). Your doctor will likely prescribe medicines for heart failure and any underlying health problems you have.  Most heart · Prevent blood clots (anticoagulants or aspirin). · Keep the heartbeat steady (antiarrhythmics). Date Last Reviewed: 3/5/2016  © 9521-1728 [facility] [address] [place] [address], [address] [place] [address]. All rights reserved.  This information is not · Make sure the room is not too hot or too cold. If it’s not dark enough, an eye mask can help. If it’s noisy, try using earplugs. Learn to relax  Stress, anxiety, and body tension may keep you awake at night.  To unwind before bedtime, try a warm bath, me FREESTYLE LANCETS Misc   Pt. Tests four times daily . Dx code E11.9           gabapentin 300 MG Caps   Take 1 capsule (300 mg total) by mouth 3 (three) times daily.    Commonly known as:  NEURONTIN           Glucose Blood Strp   Use one strip to test QID,p temazepam 15 MG Caps   Take 1 capsule (15 mg total) by mouth nightly as needed for Sleep. Commonly known as:  RESTORIL           torsemide 20 MG Tabs   Take 3 tablets daily. total 60 mg   What changed:    - how much to take  - how to take this  - when

## (undated) NOTE — ED AVS SNAPSHOT
[facility] Emergency Department    [facility]  [address] [address]    Phone:  [phone]    Fax:  [address] [place] [address] Harvinder Chacko   MRN: [id number]    Department:  [facility] Emergency Department   Date of Visit:  4/4 IF THERE IS ANY CHANGE OR WORSENING OF YOUR CONDITION, CALL YOUR PRIMARY CARE PHYSICIAN AT ONCE OR RETURN IMMEDIATELY TO THE EMERGENCY DEPARTMENT.     If you have been prescribed any medication(s), please fill your prescription right away and begin taking t

## (undated) NOTE — ED AVS SNAPSHOT
[facility] Emergency Department    [place]  [address] [address]    Phone:  [phone]    Fax:  [phone]           Melinayohannes Dorita   MRN: [id number]    Department:  [facility] Emergency Department   Date of Visit:  3/2 Pediatric [phone] Emergency Department   [phone]       To Check ER Wait Times:  TEXT 'ERwait' to 16143      Click [email or web address]      Or call [phone]    If you have any problems with your follow-up, please call our case [facility] before you leave. After you leave, you should follow the attached instructions. I have read and understand the instructions given to me by my caregivers. [facility]        Pharmacy Address Phone Number   [facility] [phone] Procedure changed from XR LUMBAR SPINE (MIN 4 VIEWS) (CPT=72110)            XR CHEST AP/PA (1 VIEW) (CPT=71010) (In process) Result time:  03/23/17 04:41:34    Procedure changed from XR CHEST PA + LAT CHEST (CPT=71020)            XR ROUTINE THORACIC SPINE

## (undated) NOTE — ED AVS SNAPSHOT
[facility] Emergency Department    [place]  [address] [address]    Phone:  [phone]    Fax:  [address]   MRN: [id number]    Department:  [facility] Emergency Department   Date of Visit:  4/6 IF THERE IS ANY CHANGE OR WORSENING OF YOUR CONDITION, CALL YOUR PRIMARY CARE PHYSICIAN AT ONCE OR RETURN IMMEDIATELY TO THE EMERGENCY DEPARTMENT.     If you have been prescribed any medication(s), please fill your prescription right away and begin taking t

## (undated) NOTE — LETTER
Jovanna Grossman 182  [address] [facility] [address], [address] [facility]  Authorization for Surgical Operation and Procedure     Date:___________                                                                                                         Time:__________ risks that can occur: fever and allergic reactions, hemolytic reactions, transmission of diseases such as Hepatitis, AIDS and Cytomegalovirus (CMV) and fluid overload.   In the event that I wish to have an autologous transfusion of my own blood, or a direct determine when the applicable recovery period ends for purposes of reinstating the DNAR order.   10. Patients having a sterilization procedure: I understand that if the procedure is successful the results will be permanent and it will therefore be impossibl (anesthesia doctor) to give me medicine and do additional procedures as necessary.  Some examples are: Starting or using an “IV” to give me medicine, fluids or blood during my procedure, and having a breathing tube placed to help me breathe when I’m asleep blocks”): I understand that rare but potential complications include headache, bleeding, infection, seizure, irregular heart rhythms, and nerve injury.     I can change my mind about having anesthesia services at any time before I get the medicine.    ____